# Patient Record
Sex: FEMALE | Race: WHITE | URBAN - METROPOLITAN AREA
[De-identification: names, ages, dates, MRNs, and addresses within clinical notes are randomized per-mention and may not be internally consistent; named-entity substitution may affect disease eponyms.]

---

## 2017-01-02 ENCOUNTER — TELEPHONE (OUTPATIENT)
Dept: FAMILY MEDICINE | Facility: CLINIC | Age: 82
End: 2017-01-02

## 2017-01-02 ENCOUNTER — HOSPITAL ENCOUNTER (EMERGENCY)
Facility: CLINIC | Age: 82
Discharge: HOME | End: 2017-01-02
Attending: FAMILY MEDICINE | Admitting: FAMILY MEDICINE
Payer: COMMERCIAL

## 2017-01-02 VITALS
OXYGEN SATURATION: 97 % | TEMPERATURE: 98.1 F | SYSTOLIC BLOOD PRESSURE: 117 MMHG | RESPIRATION RATE: 12 BRPM | DIASTOLIC BLOOD PRESSURE: 82 MMHG

## 2017-01-02 DIAGNOSIS — T24.211A: ICD-10-CM

## 2017-01-02 DIAGNOSIS — T24.212A: ICD-10-CM

## 2017-01-02 PROCEDURE — 99284 EMERGENCY DEPT VISIT MOD MDM: CPT | Mod: 25 | Performed by: FAMILY MEDICINE

## 2017-01-02 PROCEDURE — 99284 EMERGENCY DEPT VISIT MOD MDM: CPT | Mod: 25

## 2017-01-02 PROCEDURE — 16020 DRESS/DEBRID P-THICK BURN S: CPT

## 2017-01-02 PROCEDURE — 90471 IMMUNIZATION ADMIN: CPT

## 2017-01-02 PROCEDURE — 16020 DRESS/DEBRID P-THICK BURN S: CPT | Performed by: FAMILY MEDICINE

## 2017-01-02 PROCEDURE — 90715 TDAP VACCINE 7 YRS/> IM: CPT | Performed by: FAMILY MEDICINE

## 2017-01-02 PROCEDURE — 25000125 ZZHC RX 250: Performed by: FAMILY MEDICINE

## 2017-01-02 RX ADMIN — CLOSTRIDIUM TETANI TOXOID ANTIGEN (FORMALDEHYDE INACTIVATED), CORYNEBACTERIUM DIPHTHERIAE TOXOID ANTIGEN (FORMALDEHYDE INACTIVATED), BORDETELLA PERTUSSIS TOXOID ANTIGEN (GLUTARALDEHYDE INACTIVATED), BORDETELLA PERTUSSIS FILAMENTOUS HEMAGGLUTININ ANTIGEN (FORMALDEHYDE INACTIVATED), BORDETELLA PERTUSSIS PERTACTIN ANTIGEN, AND BORDETELLA PERTUSSIS FIMBRIAE 2/3 ANTIGEN 0.5 ML: 5; 2; 2.5; 5; 3; 5 INJECTION, SUSPENSION INTRAMUSCULAR at 15:55

## 2017-01-02 NOTE — ED AVS SNAPSHOT
[facility] Emergency Department    [address] [address]    Phone:  [phone]    Fax:  [phone]                                       Xochilt Trujillo   MRN: [id number]    Department:  [facility] Emergency Department   Date of Visit:  1/2/2017           After Visit Summary Signature Page     I have received my discharge instructions, and my questions have been answered. I have discussed any challenges I see with this plan with the nurse or doctor.    ..........................................................................................................................................  Patient/Patient Representative Signature      ..........................................................................................................................................  Patient Representative Print Name and Relationship to Patient    ..................................................               ................................................  Date                                            Time    ..........................................................................................................................................  Reviewed by Signature/Title    ...................................................              ..............................................  Date                                                            Time
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
## 2017-01-02 NOTE — DISCHARGE INSTRUCTIONS
Be seen for evaluation if signs of infection--increased pain, redness, pus-like drainage.   Home care nurse will do daily dressing changes this week.
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
## 2017-01-02 NOTE — ED AVS SNAPSHOT
[facility] Emergency Department    [address] [address]    Phone:  [phone]    Fax:  [phone]                                       Xochilt Trujillo   MRN: [id number]    Department:  [facility] Emergency Department   Date of Visit:  1/2/2017           Patient Information     Date Of Birth          5/28/1924        Your diagnoses for this visit were:     None       You were seen by Ubaldo Stephen MD.      Future Appointments        Provider Department Dept Phone Center    1/9/2017 2:15 PM Manju Joaquin MD [facility] [phone] [facility]      24 Hour Appointment Hotline       To make an appointment at any [facility], call [phone] ([phone]). If you don't have a family doctor or clinic, we will help you find one. [facility] are conveniently located to serve the needs of you and your family.             Review of your medicines      Our records show that you are taking the medicines listed below. If these are incorrect, please call your family doctor or clinic.        Dose / Directions Last dose taken    aspirin 81 MG EC tablet   Dose:  81 mg   Quantity:  90 tablet        Take 1 tablet (81 mg) by mouth daily   Refills:  3        blood glucose monitoring test strip   Commonly known as:  no brand specified   Quantity:  3 Box        One touch Ultra test strips testing once daily   Refills:  1        CALCIUM 600 PO        1 tablet by mouth twice daily   Refills:  0        CINNAMON PO   Dose:  1 capsule        Take 1 capsule by mouth 2 times daily   Refills:  0        LORazepam 0.5 MG tablet   Commonly known as:  ATIVAN   Dose:  0.25 mg   Quantity:  15 tablet        Take 0.5 tablets (0.25 mg) by mouth 2 times daily as needed for muscle spasms   Refills:  0        MULTIVITAMIN PO        1 tablet by mouth DAILY   Refills:  0        ONE TOUCH DELICA LANCETS Misc   Quantity:  30 each        Blood sugar check once daily   Refills:  5        * order  for DME   Quantity:  1 Device        Equipment being ordered: walker with wheels and seat   Refills:  0        * order for DME   Quantity:  1 Device        Equipment being ordered: First alert notification system.   Refills:  0        * Notice:  This list has 2 medication(s) that are the same as other medications prescribed for you. Read the directions carefully, and ask your doctor or other care provider to review them with you.            Orders Needing Specimen Collection     None      Pending Results     No orders found from 1/1/2017 to 1/3/2017.             Test Results from your hospital stay            Thank you for choosing [facility]       Thank you for choosing [facility] for your care. Our goal is always to provide you with excellent care. Hearing back from our patients is one way we can continue to improve our services. Please take a few minutes to complete the written survey that you may receive in the mail after you visit with us. Thank you!        LOC[employer]ALLhart Information     [employer] gives you secure access to your electronic health record. If you see a primary care provider, you can also send messages to your care team and make appointments. If you have questions, please call your primary care clinic.  If you do not have a primary care provider, please call [phone] and they will assist you.        After Visit Summary       This is your record. Keep this with you and show to your community pharmacist(s) and doctor(s) at your next visit.
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
## 2017-01-09 ENCOUNTER — OFFICE VISIT (OUTPATIENT)
Dept: NEUROLOGY | Facility: CLINIC | Age: 82
End: 2017-01-09
Payer: COMMERCIAL

## 2017-01-09 VITALS — DIASTOLIC BLOOD PRESSURE: 67 MMHG | SYSTOLIC BLOOD PRESSURE: 118 MMHG | TEMPERATURE: 98.6 F | HEART RATE: 95 BPM

## 2017-01-09 DIAGNOSIS — R41.3 MEMORY PROBLEM: ICD-10-CM

## 2017-01-09 DIAGNOSIS — R25.3 MUSCLE TWITCH: Primary | ICD-10-CM

## 2017-01-09 LAB
ALBUMIN SERPL-MCNC: 2.7 G/DL (ref 3.4–5)
ALP SERPL-CCNC: 86 U/L (ref 40–150)
ALT SERPL W P-5'-P-CCNC: 75 U/L (ref 0–50)
AMMONIA PLAS-SCNC: <10 UMOL/L (ref 10–50)
ANION GAP SERPL CALCULATED.3IONS-SCNC: 7 MMOL/L (ref 3–14)
AST SERPL W P-5'-P-CCNC: 60 U/L (ref 0–45)
BILIRUB SERPL-MCNC: 0.3 MG/DL (ref 0.2–1.3)
BUN SERPL-MCNC: 30 MG/DL (ref 7–30)
CALCIUM SERPL-MCNC: 9.3 MG/DL (ref 8.5–10.1)
CHLORIDE SERPL-SCNC: 107 MMOL/L (ref 94–109)
CO2 SERPL-SCNC: 29 MMOL/L (ref 20–32)
CREAT SERPL-MCNC: 1.49 MG/DL (ref 0.52–1.04)
GFR SERPL CREATININE-BSD FRML MDRD: 33 ML/MIN/1.7M2
GLUCOSE SERPL-MCNC: 113 MG/DL (ref 70–99)
POTASSIUM SERPL-SCNC: 4.7 MMOL/L (ref 3.4–5.3)
PROT SERPL-MCNC: 7.2 G/DL (ref 6.8–8.8)
SODIUM SERPL-SCNC: 143 MMOL/L (ref 133–144)

## 2017-01-09 PROCEDURE — 80053 COMPREHEN METABOLIC PANEL: CPT | Mod: 90 | Performed by: PSYCHIATRY & NEUROLOGY

## 2017-01-09 PROCEDURE — 82607 VITAMIN B-12: CPT | Mod: 90 | Performed by: PSYCHIATRY & NEUROLOGY

## 2017-01-09 PROCEDURE — 83921 ORGANIC ACID SINGLE QUANT: CPT | Mod: 90 | Performed by: PSYCHIATRY & NEUROLOGY

## 2017-01-09 PROCEDURE — 99000 SPECIMEN HANDLING OFFICE-LAB: CPT | Performed by: PSYCHIATRY & NEUROLOGY

## 2017-01-09 PROCEDURE — 99204 OFFICE O/P NEW MOD 45 MIN: CPT | Performed by: PSYCHIATRY & NEUROLOGY

## 2017-01-09 PROCEDURE — 36415 COLL VENOUS BLD VENIPUNCTURE: CPT | Performed by: PSYCHIATRY & NEUROLOGY

## 2017-01-09 PROCEDURE — 82140 ASSAY OF AMMONIA: CPT | Mod: 90 | Performed by: PSYCHIATRY & NEUROLOGY

## 2017-01-09 RX ORDER — LORAZEPAM 0.5 MG/1
0.25 TABLET ORAL 2 TIMES DAILY PRN
COMMUNITY
End: 2017-03-15

## 2017-01-09 NOTE — PATIENT INSTRUCTIONS
Plan:    Labs today - metabolic panel  Referral for Electroencephalogram   Return to clinic after the above are completed to discuss treatment options/plan

## 2017-01-09 NOTE — MR AVS SNAPSHOT
After Visit Summary   1/9/2017    Xochilt Trujillo    MRN: [id number]           Patient Information     Date Of Birth          5/28/1924        Visit Information        Provider Department      1/9/2017 2:15 PM Manju Joaquin MD [facility]        Today's Diagnoses     Muscle twitch    -  1       Care Instructions    Plan:    Labs today - metabolic panel  Referral for Electroencephalogram   Return to clinic after the above are completed to discuss treatment options/plan        Follow-ups after your visit        Future tests that were ordered for you today     Open Future Orders        Priority Expected Expires Ordered    EEG sleep deprived Routine  6/9/2017 1/9/2017            Who to contact     If you have questions or need follow up information about today's clinic visit or your schedule please contact [facility] directly at [phone].  Normal or non-critical lab and imaging results will be communicated to you by [employer], letter or phone within 4 business days after the clinic has received the results. If you do not hear from us within 7 days, please contact the clinic through [employer] or phone. If you have a critical or abnormal lab result, we will notify you by phone as soon as possible.  Submit refill requests through [employer] or call your pharmacy and they will forward the refill request to us. Please allow 3 business days for your refill to be completed.          Additional Information About Your Visit        MyChart Information     [employer] gives you secure access to your electronic health record. If you see a primary care provider, you can also send messages to your care team and make appointments. If you have questions, please call your primary care clinic.  If you do not have a primary care provider, please call [phone] and they will assist you.        Care EveryWhere ID     This is your Care EveryWhere ID. This could be used by other organizations to  access your [place] medical records  [id number]        Your Vitals Were     Pulse Temperature                95 98.6  F (37  C)           Blood Pressure from Last 3 Encounters:   01/09/17 118/67   01/02/17 117/82   11/18/16 110/60    Weight from Last 3 Encounters:   11/18/16 98 lb (44.453 kg)   11/11/16 106 lb (48.081 kg)   10/11/16 105 lb 6.4 oz (47.809 kg)              We Performed the Following     Ammonia     Comprehensive metabolic panel (BMP + Alb, Alk Phos, ALT, AST, Total. Bili, TP)     Methylmalonic acid     Vitamin B12        Primary Care Provider Office Phone # Fax #    Fran Haile [phone]       [facility] [address]  [place] [address]        Thank you!     Thank you for choosing [facility]  for your care. Our goal is always to provide you with excellent care. Hearing back from our patients is one way we can continue to improve our services. Please take a few minutes to complete the written survey that you may receive in the mail after your visit with us. Thank you!             Your Updated Medication List - Protect others around you: Learn how to safely use, store and throw away your medicines at www.disposemymeds.org.          This list is accurate as of: 1/9/17  2:55 PM.  Always use your most recent med list.                   Brand Name Dispense Instructions for use    aspirin 81 MG EC tablet     90 tablet    Take 1 tablet (81 mg) by mouth daily       blood glucose monitoring test strip    no brand specified    3 Box    One touch Ultra test strips testing once daily       LORazepam 0.5 MG tablet    ATIVAN     Take 0.25 mg by mouth 2 times daily as needed for other (muscle spasm)       ONE TOUCH DELICA LANCETS Misc     30 each    Blood sugar check once daily       * order for DME     1 Device    Equipment being ordered: walker with wheels and seat       * order for DME     1 Device    Equipment being ordered: First alert notification  system.       oxyCODONE-acetaminophen 5-325 MG per tablet    PERCOCET    28 tablet    Take 0.5-1 tablets by mouth every 4 hours as needed for pain       * Notice:  This list has 2 medication(s) that are the same as other medications prescribed for you. Read the directions carefully, and ask your doctor or other care provider to review them with you.
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
## 2017-01-10 LAB — VIT B12 SERPL-MCNC: 599 PG/ML (ref 193–986)

## 2017-01-11 LAB — METHYLMALONATE SERPL-SCNC: 0.4

## 2017-01-12 NOTE — PROGRESS NOTES
Quick Note:    Please advise Xochilt Trujillo,  1924, that her lab results show the known kidney dysfunction as well as decreased protein and elevated liver enzymes which seems to be a new problem. I am not sure if these findings relate to the twitching, but I do think that she should review her blood test results with her primary care provider, to see if he thinks any additional testing should be done.   [phone] (home)   Manju Joaquin    ______

## 2017-01-19 ENCOUNTER — HOSPITAL ENCOUNTER (OUTPATIENT)
Dept: NEUROLOGY | Facility: CLINIC | Age: 82
Discharge: HOME | End: 2017-01-19
Attending: PSYCHIATRY & NEUROLOGY | Admitting: PSYCHIATRY & NEUROLOGY
Payer: COMMERCIAL

## 2017-01-19 DIAGNOSIS — R25.3 MUSCLE TWITCH: ICD-10-CM

## 2017-01-19 PROCEDURE — 95819 EEG AWAKE AND ASLEEP: CPT

## 2017-01-19 PROCEDURE — 95819 EEG AWAKE AND ASLEEP: CPT | Mod: 26 | Performed by: PSYCHIATRY & NEUROLOGY

## 2017-01-22 NOTE — PROCEDURES
Outpatient Sleep deprived EEG Report     Patient Name:  Xochilt Trujillo  MRN:     [id number]  : `   1924    EEG#: [id number].        DATE OF RECORDIN2017.        DURATION OF RECORDIN minutes and 37 seconds.        CLINICAL SUMMARY: Xochilt Trujillo is 89 year old female patient with past medical history of chronic kidney disease, hypertension, and diabetes mellitus type 2 who was recently seen in neurology clinic for muscle twitching.  This study was ordered to evaluate for seizures and epileptiform abnormalities.  On the day of the study, the patient was reported to take prn Ativan and prn Percocet.        TECHNICAL SUMMARY:  This outpatient sleep-deprived EEG monitoring procedure was performed with 19 scalp electrodes in 10-20 system placements, and additional scalp, precordial, and other surface electrodes used for electrical referencing and artifact detection.  Video monitoring was not utilized.          INTERICTAL EEG ACTIVITIES:  During maximal waking, background consists of symmetric, moderate amplitude 6-7 Hz posterior dominant activity which is reactive to eye opening. Rarely and briefly, frequency reaches 8 Hz. It is intermixed with diffuse lower frequency theta and occasional delta slowing. Drowsiness is manifested by predominance of centrally maximum semirhythmic theta slowing and attenuation of posterior dominant activity. At that time, intermittent generalized rhythmic delta activity with frontal predominance is also seen. During quite periods of rest, no well defined sleep architecture is seen except poorly formed vertex waves. Intermittent photic stimulation is performed and does not induce any epileptiform abnormalities. Hyperventilation is not performed.        INTERICTAL EPILEPTIFORM DISCHARGES:  None.        ICTAL RECORDINGS:  No electrographic or clinical seizures and no paroxysmal behavioral events are recorded during this study.        IMPRESSION:  This outpatient  sleep-deprived EEG study is abnormal during wakefulness and sleep due to diffuse generalized slowing consistent with mild to moderate encephalopathy of non-specific etiology, including toxic, metabolic, degenerative, structural, vascular, and other pathologies. Generalized rhythmic delta activity with frontal predominance might be considered a normal finding in elderly drowsiness. Also, it may represent subcortical dysfunction secondary to vascular disease and/or other degenerative conditions. No interictal epileptiform discharges, no electrographic or clinical seizures, and no paroxysmal behavioral events were seen during this study.    Clinical correlation is recommended.      Galen Macias MD

## 2017-03-03 ENCOUNTER — TELEPHONE (OUTPATIENT)
Dept: FAMILY MEDICINE | Facility: CLINIC | Age: 82
End: 2017-03-03

## 2017-03-03 DIAGNOSIS — Z76.89 HEALTH CARE HOME: ICD-10-CM
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
## 2017-03-06 ENCOUNTER — HOSPITAL ENCOUNTER (INPATIENT)
Facility: CLINIC | Age: 82
LOS: 2 days | Discharge: HOME HEALTH | DRG: 689 | End: 2017-03-09
Attending: STUDENT IN AN ORGANIZED HEALTH CARE EDUCATION/TRAINING PROGRAM | Admitting: FAMILY MEDICINE
Payer: COMMERCIAL

## 2017-03-06 ENCOUNTER — TELEPHONE (OUTPATIENT)
Dept: FAMILY MEDICINE | Facility: CLINIC | Age: 82
End: 2017-03-06

## 2017-03-06 ENCOUNTER — APPOINTMENT (OUTPATIENT)
Dept: CT IMAGING | Facility: CLINIC | Age: 82
DRG: 689 | End: 2017-03-06
Attending: STUDENT IN AN ORGANIZED HEALTH CARE EDUCATION/TRAINING PROGRAM
Payer: COMMERCIAL

## 2017-03-06 DIAGNOSIS — N39.0 URINARY TRACT INFECTION, SITE UNSPECIFIED: ICD-10-CM

## 2017-03-06 DIAGNOSIS — R41.0 DELIRIUM: Primary | ICD-10-CM

## 2017-03-06 DIAGNOSIS — R41.82 ALTERED MENTAL STATUS, UNSPECIFIED ALTERED MENTAL STATUS TYPE: ICD-10-CM

## 2017-03-06 LAB
ALBUMIN SERPL-MCNC: 3.2 G/DL (ref 3.4–5)
ALBUMIN UR-MCNC: 10 MG/DL
ALP SERPL-CCNC: 75 U/L (ref 40–150)
ALT SERPL W P-5'-P-CCNC: 25 U/L (ref 0–50)
ANION GAP SERPL CALCULATED.3IONS-SCNC: 7 MMOL/L (ref 3–14)
APPEARANCE UR: ABNORMAL
AST SERPL W P-5'-P-CCNC: 17 U/L (ref 0–45)
BACTERIA #/AREA URNS HPF: ABNORMAL /HPF
BASOPHILS # BLD AUTO: 0.1 10E9/L (ref 0–0.2)
BASOPHILS NFR BLD AUTO: 0.6 %
BILIRUB SERPL-MCNC: 0.3 MG/DL (ref 0.2–1.3)
BILIRUB UR QL STRIP: NEGATIVE
BUN SERPL-MCNC: 31 MG/DL (ref 7–30)
CALCIUM SERPL-MCNC: 8.8 MG/DL (ref 8.5–10.1)
CHLORIDE SERPL-SCNC: 107 MMOL/L (ref 94–109)
CO2 SERPL-SCNC: 27 MMOL/L (ref 20–32)
COLOR UR AUTO: YELLOW
CREAT SERPL-MCNC: 1.42 MG/DL (ref 0.52–1.04)
DIFFERENTIAL METHOD BLD: NORMAL
EOSINOPHIL # BLD AUTO: 0.1 10E9/L (ref 0–0.7)
EOSINOPHIL NFR BLD AUTO: 1.5 %
ERYTHROCYTE [DISTWIDTH] IN BLOOD BY AUTOMATED COUNT: 14.5 % (ref 10–15)
GFR SERPL CREATININE-BSD FRML MDRD: 35 ML/MIN/1.7M2
GLUCOSE SERPL-MCNC: 112 MG/DL (ref 70–99)
GLUCOSE UR STRIP-MCNC: NEGATIVE MG/DL
HCT VFR BLD AUTO: 37.2 % (ref 35–47)
HGB BLD-MCNC: 12 G/DL (ref 11.7–15.7)
HGB UR QL STRIP: ABNORMAL
IMM GRANULOCYTES # BLD: 0 10E9/L (ref 0–0.4)
IMM GRANULOCYTES NFR BLD: 0.3 %
INR PPP: 0.93 (ref 0.86–1.14)
KETONES UR STRIP-MCNC: NEGATIVE MG/DL
LEUKOCYTE ESTERASE UR QL STRIP: ABNORMAL
LYMPHOCYTES # BLD AUTO: 1.8 10E9/L (ref 0.8–5.3)
LYMPHOCYTES NFR BLD AUTO: 18.9 %
MCH RBC QN AUTO: 27.9 PG (ref 26.5–33)
MCHC RBC AUTO-ENTMCNC: 32.3 G/DL (ref 31.5–36.5)
MCV RBC AUTO: 87 FL (ref 78–100)
MONOCYTES # BLD AUTO: 0.9 10E9/L (ref 0–1.3)
MONOCYTES NFR BLD AUTO: 9.2 %
MUCOUS THREADS #/AREA URNS LPF: PRESENT /LPF
NEUTROPHILS # BLD AUTO: 6.6 10E9/L (ref 1.6–8.3)
NEUTROPHILS NFR BLD AUTO: 69.5 %
NITRATE UR QL: POSITIVE
PH UR STRIP: 5.5 PH (ref 5–7)
PLATELET # BLD AUTO: 275 10E9/L (ref 150–450)
POTASSIUM SERPL-SCNC: 4.3 MMOL/L (ref 3.4–5.3)
PROT SERPL-MCNC: 7 G/DL (ref 6.8–8.8)
RBC # BLD AUTO: 4.3 10E12/L (ref 3.8–5.2)
RBC #/AREA URNS AUTO: 9 /HPF (ref 0–2)
SODIUM SERPL-SCNC: 141 MMOL/L (ref 133–144)
SP GR UR STRIP: 1.02 (ref 1–1.03)
SQUAMOUS #/AREA URNS AUTO: 3 /HPF (ref 0–1)
TROPONIN I SERPL-MCNC: NORMAL UG/L (ref 0–0.04)
TSH SERPL DL<=0.005 MIU/L-ACNC: 2.54 MU/L (ref 0.4–4)
URN SPEC COLLECT METH UR: ABNORMAL
UROBILINOGEN UR STRIP-MCNC: NORMAL MG/DL (ref 0–2)
WBC # BLD AUTO: 9.5 10E9/L (ref 4–11)
WBC #/AREA URNS AUTO: 41 /HPF (ref 0–2)

## 2017-03-06 PROCEDURE — 96374 THER/PROPH/DIAG INJ IV PUSH: CPT

## 2017-03-06 PROCEDURE — 93010 ELECTROCARDIOGRAM REPORT: CPT | Performed by: STUDENT IN AN ORGANIZED HEALTH CARE EDUCATION/TRAINING PROGRAM

## 2017-03-06 PROCEDURE — 81001 URINALYSIS AUTO W/SCOPE: CPT | Performed by: EMERGENCY MEDICINE

## 2017-03-06 PROCEDURE — 99285 EMERGENCY DEPT VISIT HI MDM: CPT | Mod: 25 | Performed by: STUDENT IN AN ORGANIZED HEALTH CARE EDUCATION/TRAINING PROGRAM

## 2017-03-06 PROCEDURE — 87088 URINE BACTERIA CULTURE: CPT | Performed by: STUDENT IN AN ORGANIZED HEALTH CARE EDUCATION/TRAINING PROGRAM

## 2017-03-06 PROCEDURE — 87186 SC STD MICRODIL/AGAR DIL: CPT | Performed by: STUDENT IN AN ORGANIZED HEALTH CARE EDUCATION/TRAINING PROGRAM

## 2017-03-06 PROCEDURE — 84443 ASSAY THYROID STIM HORMONE: CPT | Performed by: STUDENT IN AN ORGANIZED HEALTH CARE EDUCATION/TRAINING PROGRAM

## 2017-03-06 PROCEDURE — 99285 EMERGENCY DEPT VISIT HI MDM: CPT | Mod: 25

## 2017-03-06 PROCEDURE — 70450 CT HEAD/BRAIN W/O DYE: CPT

## 2017-03-06 PROCEDURE — 93005 ELECTROCARDIOGRAM TRACING: CPT

## 2017-03-06 PROCEDURE — 80053 COMPREHEN METABOLIC PANEL: CPT | Performed by: STUDENT IN AN ORGANIZED HEALTH CARE EDUCATION/TRAINING PROGRAM

## 2017-03-06 PROCEDURE — 85610 PROTHROMBIN TIME: CPT | Performed by: STUDENT IN AN ORGANIZED HEALTH CARE EDUCATION/TRAINING PROGRAM

## 2017-03-06 PROCEDURE — 96361 HYDRATE IV INFUSION ADD-ON: CPT

## 2017-03-06 PROCEDURE — 87086 URINE CULTURE/COLONY COUNT: CPT | Performed by: STUDENT IN AN ORGANIZED HEALTH CARE EDUCATION/TRAINING PROGRAM

## 2017-03-06 PROCEDURE — 84484 ASSAY OF TROPONIN QUANT: CPT | Performed by: STUDENT IN AN ORGANIZED HEALTH CARE EDUCATION/TRAINING PROGRAM

## 2017-03-06 PROCEDURE — 85025 COMPLETE CBC W/AUTO DIFF WBC: CPT | Performed by: STUDENT IN AN ORGANIZED HEALTH CARE EDUCATION/TRAINING PROGRAM

## 2017-03-06 PROCEDURE — 82140 ASSAY OF AMMONIA: CPT | Performed by: STUDENT IN AN ORGANIZED HEALTH CARE EDUCATION/TRAINING PROGRAM

## 2017-03-06 PROCEDURE — 25000128 H RX IP 250 OP 636: Performed by: STUDENT IN AN ORGANIZED HEALTH CARE EDUCATION/TRAINING PROGRAM

## 2017-03-06 RX ADMIN — SODIUM CHLORIDE 500 ML: 9 INJECTION, SOLUTION INTRAVENOUS at 23:30

## 2017-03-06 NOTE — IP AVS SNAPSHOT
[facility]    [address] [address]    Phone:  [phone]    Fax:  [phone]                                       After Visit Summary   3/6/2017    Xochilt Trujillo    MRN: [id number]           After Visit Summary Signature Page     I have received my discharge instructions, and my questions have been answered. I have discussed any challenges I see with this plan with the nurse or doctor.    ..........................................................................................................................................  Patient/Patient Representative Signature      ..........................................................................................................................................  Patient Representative Print Name and Relationship to Patient    ..................................................               ................................................  Date                                            Time    ..........................................................................................................................................  Reviewed by Signature/Title    ...................................................              ..............................................  Date                                                            Time
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
## 2017-03-06 NOTE — TELEPHONE ENCOUNTER
Pt daughter is calling and reports that she lives with her mother  And when she came home today, her mother was very confused and dint really know who she was and was talking gibberish and very confused, and not making any sense what so ever, facial features looked ok and she doesn't hurt anywhere, Tone asked her mom to get her cell phone out of her pocket and she started looking in the couch, this is not normal as the phone was in her moms jeans pocket, please advise.     Spoke with satinder MARTINEZ and she advised she Go to the ER and or call 911, it could be a diabetes issue and or a possible UTI, and we have no available dr at this moment.     Erica Carrasco, [place]

## 2017-03-06 NOTE — LETTER
Transition Communication Hand-off for Care Transitions to Next Level of Care Provider    Name: Xochilt Trujillo  MRN #: [id number]  Primary Care Provider: Fran Haile  Primary Care MD Name:  (Lazara)  Primary Clinic: [facility] [address]  [place] [address]  Primary Care Clinic Name:  ([facility])  Reason for Hospitalization:  Altered mental status, unspecified altered mental status type [R41.82]  Urinary tract infection, site unspecified [N39.0]  Admit Date/Time: 3/6/2017 10:10 PM  Discharge Date: 3/8/17  Payor Source: Payor: UCARE / Plan: [employer] FOR S[employer]IORS / Product Type: HMO /          Reason for Communication Hand-off Referral: Fragility    Discharge Plan:[facility]       Concern for non-adherence with plan of care:   Y/N no      Follow-up specialty is recommended: Yes    Follow-up plan:  Future Appointments  Date Time Provider Department Center   3/8/2017 11:30 AM [facility]   4/18/2017 9:30 AM Manju Joaquin MD [address]         [place] Recommendations:  Pt recently discharged from [facility]. Pt admitted here with delerium, doing better. Pt lives with dtr, sounds like dtr is trying to fill out paperwork for [place] or some sort of county assistance. Pt may eventually need increased services at home.     Angélica Rodríguez MSW, [facility], [facility] [phone]    AVS/Discharge Summary is the source of truth; this is a helpful guide for improved communication of patient story

## 2017-03-06 NOTE — TELEPHONE ENCOUNTER
No care coordination needs identified.  No concerns noted by home care below.  Patient is under the care of family.    JENIFFER Rodriguez, RN, PHN  [facility] Care Coordinator  [phone]  March 6, 2017
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
## 2017-03-07 ENCOUNTER — APPOINTMENT (OUTPATIENT)
Dept: OCCUPATIONAL THERAPY | Facility: CLINIC | Age: 82
DRG: 689 | End: 2017-03-07
Payer: COMMERCIAL

## 2017-03-07 ENCOUNTER — APPOINTMENT (OUTPATIENT)
Dept: GENERAL RADIOLOGY | Facility: CLINIC | Age: 82
DRG: 689 | End: 2017-03-07
Attending: STUDENT IN AN ORGANIZED HEALTH CARE EDUCATION/TRAINING PROGRAM
Payer: COMMERCIAL

## 2017-03-07 PROBLEM — R41.0 DELIRIUM: Status: ACTIVE | Noted: 2017-03-07

## 2017-03-07 PROBLEM — N39.0 URINARY TRACT INFECTION: Status: ACTIVE | Noted: 2017-03-07

## 2017-03-07 LAB
AMMONIA PLAS-SCNC: <10 UMOL/L (ref 10–50)
LACTATE BLD-SCNC: 0.8 MMOL/L (ref 0.7–2.1)
PLATELET # BLD AUTO: 277 10E9/L (ref 150–450)
T4 FREE SERPL-MCNC: 1.02 NG/DL (ref 0.76–1.46)
TSH SERPL DL<=0.05 MIU/L-ACNC: 3.13 MU/L (ref 0.4–4)
VIT B12 SERPL-MCNC: 415 PG/ML (ref 193–986)

## 2017-03-07 PROCEDURE — 83605 ASSAY OF LACTIC ACID: CPT | Performed by: PHYSICIAN ASSISTANT

## 2017-03-07 PROCEDURE — 84439 ASSAY OF FREE THYROXINE: CPT | Performed by: PHYSICIAN ASSISTANT

## 2017-03-07 PROCEDURE — 25000128 H RX IP 250 OP 636: Performed by: PHYSICIAN ASSISTANT

## 2017-03-07 PROCEDURE — 25000132 ZZH RX MED GY IP 250 OP 250 PS 637: Performed by: PHYSICIAN ASSISTANT

## 2017-03-07 PROCEDURE — 97165 OT EVAL LOW COMPLEX 30 MIN: CPT | Mod: GO | Performed by: OCCUPATIONAL THERAPIST

## 2017-03-07 PROCEDURE — 36415 COLL VENOUS BLD VENIPUNCTURE: CPT | Performed by: PHYSICIAN ASSISTANT

## 2017-03-07 PROCEDURE — 25000128 H RX IP 250 OP 636: Performed by: STUDENT IN AN ORGANIZED HEALTH CARE EDUCATION/TRAINING PROGRAM

## 2017-03-07 PROCEDURE — 84443 ASSAY THYROID STIM HORMONE: CPT | Performed by: PHYSICIAN ASSISTANT

## 2017-03-07 PROCEDURE — 99221 1ST HOSP IP/OBS SF/LOW 40: CPT | Mod: AI | Performed by: FAMILY MEDICINE

## 2017-03-07 PROCEDURE — 85049 AUTOMATED PLATELET COUNT: CPT | Performed by: PHYSICIAN ASSISTANT

## 2017-03-07 PROCEDURE — 40000133 ZZH STATISTIC OT WARD VISIT: Performed by: OCCUPATIONAL THERAPIST

## 2017-03-07 PROCEDURE — 99207 ZZC CDG-HISTORY COMP: MEETS EXP. PROB FOCUSED- DOWN CODED LACK OF PFSH: CPT | Performed by: FAMILY MEDICINE

## 2017-03-07 PROCEDURE — 82607 VITAMIN B-12: CPT | Performed by: PHYSICIAN ASSISTANT

## 2017-03-07 PROCEDURE — 97535 SELF CARE MNGMENT TRAINING: CPT | Mod: GO | Performed by: OCCUPATIONAL THERAPIST

## 2017-03-07 PROCEDURE — 71020 XR CHEST 2 VW: CPT

## 2017-03-07 PROCEDURE — 12000007 ZZH R&B INTERMEDIATE

## 2017-03-07 RX ORDER — ASPIRIN 81 MG/1
81 TABLET ORAL DAILY
Status: DISCONTINUED | OUTPATIENT
Start: 2017-03-07 | End: 2017-03-09 | Stop reason: HOSPADM

## 2017-03-07 RX ORDER — PROCHLORPERAZINE 25 MG
12.5 SUPPOSITORY, RECTAL RECTAL EVERY 12 HOURS PRN
Status: DISCONTINUED | OUTPATIENT
Start: 2017-03-07 | End: 2017-03-09 | Stop reason: HOSPADM

## 2017-03-07 RX ORDER — ONDANSETRON 4 MG/1
4 TABLET, ORALLY DISINTEGRATING ORAL EVERY 6 HOURS PRN
Status: DISCONTINUED | OUTPATIENT
Start: 2017-03-07 | End: 2017-03-09 | Stop reason: HOSPADM

## 2017-03-07 RX ORDER — ONDANSETRON 2 MG/ML
4 INJECTION INTRAMUSCULAR; INTRAVENOUS EVERY 6 HOURS PRN
Status: DISCONTINUED | OUTPATIENT
Start: 2017-03-07 | End: 2017-03-09 | Stop reason: HOSPADM

## 2017-03-07 RX ORDER — ACETAMINOPHEN 325 MG/1
650 TABLET ORAL EVERY 4 HOURS PRN
Status: DISCONTINUED | OUTPATIENT
Start: 2017-03-07 | End: 2017-03-09 | Stop reason: HOSPADM

## 2017-03-07 RX ORDER — ONDANSETRON 2 MG/ML
4 INJECTION INTRAMUSCULAR; INTRAVENOUS EVERY 6 HOURS PRN
Status: DISCONTINUED | OUTPATIENT
Start: 2017-03-07 | End: 2017-03-07

## 2017-03-07 RX ORDER — SODIUM CHLORIDE 9 MG/ML
INJECTION, SOLUTION INTRAVENOUS CONTINUOUS
Status: ACTIVE | OUTPATIENT
Start: 2017-03-07 | End: 2017-03-07

## 2017-03-07 RX ORDER — ONDANSETRON 4 MG/1
4 TABLET, ORALLY DISINTEGRATING ORAL EVERY 6 HOURS PRN
Status: DISCONTINUED | OUTPATIENT
Start: 2017-03-07 | End: 2017-03-07

## 2017-03-07 RX ORDER — LORAZEPAM 0.5 MG/1
0.25 TABLET ORAL 2 TIMES DAILY PRN
Status: DISCONTINUED | OUTPATIENT
Start: 2017-03-07 | End: 2017-03-09 | Stop reason: HOSPADM

## 2017-03-07 RX ORDER — ACETAMINOPHEN 325 MG/1
650 TABLET ORAL EVERY 4 HOURS PRN
Status: DISCONTINUED | OUTPATIENT
Start: 2017-03-07 | End: 2017-03-07

## 2017-03-07 RX ORDER — CEFTRIAXONE 1 G/1
1 INJECTION, POWDER, FOR SOLUTION INTRAMUSCULAR; INTRAVENOUS EVERY 24 HOURS
Status: DISCONTINUED | OUTPATIENT
Start: 2017-03-07 | End: 2017-03-09 | Stop reason: HOSPADM

## 2017-03-07 RX ORDER — HEPARIN SODIUM 5000 [USP'U]/.5ML
5000 INJECTION, SOLUTION INTRAVENOUS; SUBCUTANEOUS EVERY 12 HOURS
Status: DISCONTINUED | OUTPATIENT
Start: 2017-03-07 | End: 2017-03-09 | Stop reason: HOSPADM

## 2017-03-07 RX ORDER — PROCHLORPERAZINE MALEATE 5 MG
5 TABLET ORAL EVERY 6 HOURS PRN
Status: DISCONTINUED | OUTPATIENT
Start: 2017-03-07 | End: 2017-03-09 | Stop reason: HOSPADM

## 2017-03-07 RX ORDER — NALOXONE HYDROCHLORIDE 0.4 MG/ML
.1-.4 INJECTION, SOLUTION INTRAMUSCULAR; INTRAVENOUS; SUBCUTANEOUS
Status: DISCONTINUED | OUTPATIENT
Start: 2017-03-07 | End: 2017-03-09 | Stop reason: HOSPADM

## 2017-03-07 RX ORDER — OXYCODONE AND ACETAMINOPHEN 5; 325 MG/1; MG/1
.5-1 TABLET ORAL EVERY 4 HOURS PRN
Status: DISCONTINUED | OUTPATIENT
Start: 2017-03-07 | End: 2017-03-09 | Stop reason: HOSPADM

## 2017-03-07 RX ORDER — SODIUM CHLORIDE 9 MG/ML
INJECTION, SOLUTION INTRAVENOUS CONTINUOUS
Status: DISCONTINUED | OUTPATIENT
Start: 2017-03-07 | End: 2017-03-08

## 2017-03-07 RX ORDER — NALOXONE HYDROCHLORIDE 0.4 MG/ML
.1-.4 INJECTION, SOLUTION INTRAMUSCULAR; INTRAVENOUS; SUBCUTANEOUS
Status: DISCONTINUED | OUTPATIENT
Start: 2017-03-07 | End: 2017-03-07

## 2017-03-07 RX ADMIN — SODIUM CHLORIDE: 9 INJECTION, SOLUTION INTRAVENOUS at 02:04

## 2017-03-07 RX ADMIN — ASPIRIN 81 MG: 81 TABLET, COATED ORAL at 08:32

## 2017-03-07 RX ADMIN — HEPARIN SODIUM 5000 UNITS: 10000 INJECTION, SOLUTION INTRAVENOUS; SUBCUTANEOUS at 12:55

## 2017-03-07 RX ADMIN — SODIUM CHLORIDE: 9 INJECTION, SOLUTION INTRAVENOUS at 20:18

## 2017-03-07 RX ADMIN — CEFTRIAXONE 1 G: 1 INJECTION, POWDER, FOR SOLUTION INTRAMUSCULAR; INTRAVENOUS at 00:46

## 2017-03-07 RX ADMIN — Medication 0.25 MG: at 18:21

## 2017-03-07 ASSESSMENT — VISUAL ACUITY: OU: OTHER (SEE COMMENT)

## 2017-03-07 ASSESSMENT — ACTIVITIES OF DAILY LIVING (ADL)
SWALLOWING: 0-->SWALLOWS FOODS/LIQUIDS WITHOUT DIFFICULTY
COGNITION: 2 - DIFFICULTY WITH ORGANIZING THOUGHTS
RETIRED_COMMUNICATION: 0-->UNDERSTANDS/COMMUNICATES WITHOUT DIFFICULTY
BATHING: 2-->ASSISTIVE PERSON
RETIRED_EATING: 0-->INDEPENDENT
DRESS: 0-->INDEPENDENT

## 2017-03-07 NOTE — ED PROVIDER NOTES
History     Chief Complaint   Patient presents with     Altered Mental Status     confused     Chest Pain     pressure     Shortness of Breath     HPI  Xochilt Trujillo is a 89 year old female with past medical history which includes abdominal aneurysm, previous cerebral infarction, Epic ulcer disease, myocardial infarction, chronic kidney disease, diabetes mellitus, and recurrent urinary tract infections who presents with daughter for evaluation of intermittent confusion this evening. Patient lives with her daughter and she explains that when she arrived home from work the patient seemed confused talking about her birthday tomorrow, which is not the case. She was also using a butter knife as a fork for dinner. However she has been without focal unilateral deficits or sign of stroke, according to daughter. The patient has also been without fever/chills, cough, shortness of breath, chest pain, abdominal pain, nausea/vomiting, genitourinary symptoms, or other concerns.    I have reviewed the Medications, Allergies, Past Medical and Surgical History, and Social History in the Epic system.    Patient Active Problem List   Diagnosis     Iron deficiency anemia     Abdominal aneurysm (H)     HYPERLIPIDEMIA LDL GOAL <100     Cerebral infarction (H)     PUD (peptic ulcer disease)     Ovarian cancer (H)     Erosive gastritis     MI (myocardial infarction) (H)     Sigmoid diverticulitis     CKD (chronic kidney disease) stage 4, GFR 15-29 ml/min (H)     Diabetes mellitus type 2, uncomplicated (H)     Hypertension goal BP (blood pressure) < 140/90     Advanced directives, counseling/discussion     Health Care Home     Pseudophakia with Yag Caps, OU     PVD (posterior vitreous detachment) OU     Age-related osteoporosis without current pathological fracture     Dermatochalasis, unspecified laterality     Delirium       Past Surgical History   Procedure Laterality Date     Hysterectomy, cervix status unknown       For ovarian  cancer     D & c       Hc esophagoscopy, diagnostic       Colonoscopy       Extracapsular cataract extration with intraocular lens implant  1-03/9-04     left/right     Cataract iol, rt/lt         Social History     Social History     Marital status:      Spouse name: N/A     Number of children: N/A     Years of education: N/A     Occupational History      Retired     Social History Main Topics     Smoking status: Never Smoker     Smokeless tobacco: Never Used     Alcohol use No     Drug use: No     Sexual activity: No     Other Topics Concern     Parent/Sibling W/ Cabg, Mi Or Angioplasty Before 65f 55m? No     Social History Narrative       No family history on file.    Most Recent Immunizations   Administered Date(s) Administered     Influenza (High Dose) 3 valent vaccine 10/11/2016     Influenza (IIV3) 09/13/2010     Pneumococcal (PCV 13) 10/11/2016     Pneumococcal 23 valent 01/16/2008     TD (ADULT, 7+) 01/16/2007     TDAP (ADACEL AGES 11-64) 01/02/2017       Review of Systems  Constitutional: Negative for fever or chills.  HENT: Negative oral or throat pain.  Eye: Negative for visual changes from baseline.  Respiratory: Negative for cough or shortness of breath.  Cardiovascular: Negative for chest pain.  Gastrointestinal: Negative for abdominal pain, vomiting, or diarrhea.   Genitourinary: Negative for dysuria.  Musculoskeletal: Negative for back pain or recent injuries.  Neurological: Positive for waxing and waning confusion. Negative for headache or dizziness.  Skin: Negative for rash.    All others reviewed and are negative.      Physical Exam   BP: 161/81  Pulse: 88  Temp: 97.8  F (36.6  C)  Resp: 20  SpO2: 97 %  Physical Exam  Constitutional: Well developed, well nourished. Appears nontoxic and in no acute distress. Resting comfortably on the gurney.  Head: Normocephalic and atraumatic. Symmetrical in appearance.  Eyes: Conjunctivae are normal.  Neck: Neck supple.  Cardiovascular: No cyanosis. RRR.    Respiratory: Effort normal, no respiratory distress. CTAB without diminished regions. No wheezing, rhonchi, or crackles.  Gastrointestinal: Soft, nondistended abdomen. Nontender and without guarding. No rigidity or rebound tenderness. Negative McBurney's point. Negative for Leyva's sign.   Musculoskeletal: Moves all extremities spontaneously and without complaint.  Neuro: Patient is alert and oriented to person.  Skin: Skin is warm and dry, not diaphoretic.  Psych: Appears to have a normal mood and affect.      ED Course     ED Course     Procedures               EKG Interpretation:      Interpreted by: Walker Juares  Time reviewed: Upon arrival     Symptoms at time of EKG: Delirium  Rhythm: Sinus  Rate: Normal  Axis: Normal    Conduction: None atypical   ST Segments/ T Waves: No pathologic ST-elevations or T-wave abnormalities.  Q Waves: None  Comparison to prior: Similar morphology to previous     Clinical Impression: No sign of ischemia         Critical Care time:  none               Results for orders placed or performed during the hospital encounter of 03/06/17 (from the past 24 hour(s))   UA reflex to Microscopic and Culture   Result Value Ref Range    Color Urine Yellow     Appearance Urine Slightly Cloudy     Glucose Urine Negative NEG mg/dL    Bilirubin Urine Negative NEG    Ketones Urine Negative NEG mg/dL    Specific Gravity Urine 1.016 1.003 - 1.035    Blood Urine Trace (A) NEG    pH Urine 5.5 5.0 - 7.0 pH    Protein Albumin Urine 10 (A) NEG mg/dL    Urobilinogen mg/dL Normal 0.0 - 2.0 mg/dL    Nitrite Urine Positive (A) NEG    Leukocyte Esterase Urine Large (A) NEG    Source Midstream Urine     RBC Urine 9 (H) 0 - 2 /HPF    WBC Urine 41 (H) 0 - 2 /HPF    Bacteria Urine Moderate (A) NEG /HPF    Squamous Epithelial /HPF Urine 3 (H) 0 - 1 /HPF    Mucous Urine Present (A) NEG /LPF   CBC with platelets differential   Result Value Ref Range    WBC 9.5 4.0 - 11.0 10e9/L    RBC Count 4.30 3.8 - 5.2 10e12/L     Hemoglobin 12.0 11.7 - 15.7 g/dL    Hematocrit 37.2 35.0 - 47.0 %    MCV 87 78 - 100 fl    MCH 27.9 26.5 - 33.0 pg    MCHC 32.3 31.5 - 36.5 g/dL    RDW 14.5 10.0 - 15.0 %    Platelet Count 275 150 - 450 10e9/L    Diff Method Automated Method     % Neutrophils 69.5 %    % Lymphocytes 18.9 %    % Monocytes 9.2 %    % Eosinophils 1.5 %    % Basophils 0.6 %    % Immature Granulocytes 0.3 %    Absolute Neutrophil 6.6 1.6 - 8.3 10e9/L    Absolute Lymphocytes 1.8 0.8 - 5.3 10e9/L    Absolute Monocytes 0.9 0.0 - 1.3 10e9/L    Absolute Eosinophils 0.1 0.0 - 0.7 10e9/L    Absolute Basophils 0.1 0.0 - 0.2 10e9/L    Abs Immature Granulocytes 0.0 0 - 0.4 10e9/L   INR   Result Value Ref Range    INR 0.93 0.86 - 1.14   Troponin I   Result Value Ref Range    Troponin I ES  0.000 - 0.045 ug/L     <0.015  The 99th percentile for upper reference range is 0.045 ug/L.  Troponin values in   the range of 0.045 - 0.120 ug/L may be associated with risks of adverse   clinical events.     Comprehensive metabolic panel   Result Value Ref Range    Sodium 141 133 - 144 mmol/L    Potassium 4.3 3.4 - 5.3 mmol/L    Chloride 107 94 - 109 mmol/L    Carbon Dioxide 27 20 - 32 mmol/L    Anion Gap 7 3 - 14 mmol/L    Glucose 112 (H) 70 - 99 mg/dL    Urea Nitrogen 31 (H) 7 - 30 mg/dL    Creatinine 1.42 (H) 0.52 - 1.04 mg/dL    GFR Estimate 35 (L) >60 mL/min/1.7m2    GFR Estimate If Black 42 (L) >60 mL/min/1.7m2    Calcium 8.8 8.5 - 10.1 mg/dL    Bilirubin Total 0.3 0.2 - 1.3 mg/dL    Albumin 3.2 (L) 3.4 - 5.0 g/dL    Protein Total 7.0 6.8 - 8.8 g/dL    Alkaline Phosphatase 75 40 - 150 U/L    ALT 25 0 - 50 U/L    AST 17 0 - 45 U/L   TSH with free T4 reflex   Result Value Ref Range    TSH 2.54 0.40 - 4.00 mU/L   Ammonia (on ice)   Result Value Ref Range    Ammonia <10 (L) 10 - 50 umol/L   CT Head w/o Contrast    Narrative    CT HEAD W/O CONTRAST 3/6/2017 11:27 PM      HISTORY: Altered mental status.    TECHNIQUE: Routine noncontrast head CT. Radiation  dose for this scan  was reduced using automated exposure control, adjustment of the mA  and/or kV according to patient size, or iterative reconstruction  technique.    COMPARISON: 2/2/2016.    FINDINGS: There is generalized brain atrophy. No mass, mass effect or  intracranial hemorrhage. No evidence of acute infarct.   Periventricular low attenuation is consistent with chronic small  vessel ischemic disease. The visualized paranasal sinuses are clear.      Impression    IMPRESSION:  No acute abnormality.    VANESSA LANDON MD   Chest XR,  PA & LAT    Narrative    XR CHEST 2 VW  3/7/2017 12:22 AM      HISTORY: Altered mental status.     COMPARISON: 2/2/2016.    FINDINGS: The heart size is normal. The thoracic aorta is calcified  and mildly tortuous. The lungs are hyperinflated. There is a calcified  granuloma in the left apex laterally. The lungs otherwise appear  clear. No pneumothorax or pleural effusion. Degenerative disease in  the thoracic spine.      Impression    IMPRESSION: No acute abnormality.         Assessments & Plan (with Medical Decision Making)   Xochilt Trujillo is a 89 year old female who presents to the department who presents with daughter for evaluation of waxing and waning confusion throughout the evening. She does have a previous diagnosis of cerebral infarction but she has no focal neurologic deficits and head CT without identifiable pathology. Her CBC is without leukocytosis, benign abdominal examination, without nausea/vomiting/diarrhea, and clear lungs on auscultation without respiratory infectious symptoms. Her urinalysis however is positive for leukocyte esterase and nitrite.     Patient was intermittently confused in the department, but pleasant demeanor. She will require admission for delirium with evidence of urinary tract infection. An initial dose of ceftriaxone given in the department. Consulted hospitalist Dr. Mercado referring patient presentation and workup. He agrees with plan  for full admission and antibiotic therapy regimen. No further recommendations. Temporary transition orders were placed per protocol.    The patient and her accompanying daughter have been informed of her results and the recommendation for admission. They have verbalized an understanding, all questions answered, and they are in agreement with the plan at this time.      Disclaimer: This note consists of symbols derived from keyboarding, dictation, and/or voice recognition software. As a result, there may be errors in the script that have gone undetected.  Please consider this when interpreting information found in the chart.          I have reviewed the nursing notes.    I have reviewed the findings, diagnosis, plan and need for follow up with the patient.    Current Discharge Medication List          Final diagnoses:   Altered mental status, unspecified altered mental status type   Urinary tract infection, site unspecified       3/6/2017   [facility] EMERGENCY DEPARTMENT     Walker Juares DO  03/07/17 0141
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
## 2017-03-07 NOTE — H&P
[facility]    History and Physical  Hospital Medicine       Date of Admission:  3/6/2017  Date of Service: 3/7/2017     Assessment & Plan   Xochilt Trujillo is a 89 year old female with PMH significant for HTN, prior CVA, frequent UTIs, hx of T2DM, hx of HLD, hx of GI bleed with GERD, hx of MI who now presents with altered mental status.      Acute Encephalopathy  DDx: Infection (UA with 41 WBC, urine culture growing greater than 100,000 colonies of gram negative rods) versus Metabolic derangements (thyroid, Vit B12 deficiency)  Only on baby aspirin daily.  No other use of pain medication, benzodiazepines.  No focal neuro deficits.  BP controlled.  No evidence of organ failure.  CXR without infiltrate.  CT head was negative for acute pathology or bleed  - blood cultures were not drawn, given that antibiotics have already been started, no clear utility to draw now.  - order lactic acid, TSH, free T4, B12    Urinary Tract Infection  41 WBC, large LE, positive nitrites.  Culture growing greater than 100,000 colonies of gram negative rods  - continue rocephin 1g IV, Q24  - transition to outpatient cefazolin PO  - AM CBC with platelets and differential ordered tomorrow AM  - monitor I and O's    Cerebral infarction (H), Hx of MI  Historical.  Patient has no memory. No correlating scans  - continue PTA baby aspirin      CKD (chronic kidney disease) stage 4, GFR 15-29 ml/min (H)  Chronic, at baseline.  Creatinine 1.34 on admission. Baseline 1.26 to 1.50    Hypertension goal BP (blood pressure) < 140/90  BP reviewed.  Controlled/adequate for age.  Not on medications     Hx of T2DM  Not currently utilizing medication    Hx of HLD  Not currently utilizing medication    Hx of GERD, Hx of GI bleed  Not currently utilizing medication    F: 50mL/hr 0.9NS  E: BMP in AM  N: regular diet  DVT Prophylaxis: Heparin given CrCl    Code Status: DNR / DNI    Disposition: Anticipate discharge in 2-3 days.  "Appropriate for inpatient care.    Case discussed with Dr. Stuart Burrell.  Assessment and plan as written above.    Yudelka Marquez PA-C  [facility]    History is obtained from the patient and review of the EMR.    Past Medical History    Past Medical History   Diagnosis Date     Abdominal aneurysm (H)      3-5 cm     ASHD (arteriosclerotic heart disease)      CVA (cerebral infarction) 1999     Cystocele      Erosive gastritis      Hypertension      MI (myocardial infarction) (H) 1999     Osteoporosis      Other and unspecified hyperlipidemia      Ovarian cancer (H)      PUD (peptic ulcer disease)      Skin cancer 07/2010     SCCIS of the L nasal bridge     Type 2 diabetes, HbA1C goal < 8% (H) 8/6/2012       Past Surgical History   Past Surgical History   Procedure Laterality Date     Hysterectomy, cervix status unknown       For ovarian cancer     D & c       Hc esophagoscopy, diagnostic       Colonoscopy       Extracapsular cataract extration with intraocular lens implant  1-03/9-04     left/right     Cataract iol, rt/lt         Family History    No family history on file.    History of Present Illness   Xochilt Trujillo is a 89 year old female who now presents with altered mental status. The patient presents from home.  She lives with her daughter whom is not present for this writer's interview.  The patient states she has been feeling \"fine\" as of late; reports possible urinary frequency.  No overt dysuria, hematuria, urgency.  The patient states she a \"little\" more confused.  A daughter with whom the patient does NOT live is present at bedside; she confirms that while the patient has some baseline dementia, this is far more profound than baseline.  In conversations with her sister (that with whom the patient lives), she relays that the patient became acutely confused yesterday afternoon into the evening.    The patient herself denies fever, chills, nausea, vomiting, loss of appetite, " myalgias, cold-like symptoms (congestion, pharyngitis, sinus pressure, rhinorrhea), swollen glands, headaches, lightheadedness, dizziness, chest pain, palpitations/flutters, SOB, cough, wheezes, abdominal pain, diarrhea/constipation, dysuria, hematuria, joint swelling, joint pain, leg swelling, and rashes.      Prior to Admission Medications   Prior to Admission Medications   Prescriptions Last Dose Informant Patient Reported? Taking?   LORazepam (ATIVAN) 0.5 MG tablet 3/6/2017 at 0745 Self Yes Yes   Sig: Take 0.25 mg by mouth 2 times daily as needed for other (muscle spasm)   ONE TOUCH DELICA LANCETS MISC 3/6/2017 at Unknown time Self No Yes   Sig: Blood sugar check once daily   ORDER FOR DME  Self No No   Sig: Equipment being ordered: walker with wheels and seat   aspirin 81 MG EC tablet 3/6/2017 at am Self No Yes   Sig: Take 1 tablet (81 mg) by mouth daily   blood glucose test strip 3/6/2017 at #158 Self No Yes   Sig: One touch Ultra test strips testing once daily   order for DME  Self No No   Sig: Equipment being ordered: First alert notification system.   oxyCODONE-acetaminophen (PERCOCET) 5-325 MG per tablet Past Month at Unknown time Self No Yes   Sig: Take 0.5-1 tablets by mouth every 4 hours as needed for pain      Facility-Administered Medications: None       Allergies   Allergies   Allergen Reactions     Sulfa Drugs Unknown       Social History   Social History     Social History     Marital status:      Spouse name: N/A     Number of children: N/A     Years of education: N/A     Occupational History      Retired     Social History Main Topics     Smoking status: Never Smoker     Smokeless tobacco: Never Used     Alcohol use No     Drug use: No     Sexual activity: No     Other Topics Concern     Parent/Sibling W/ Cabg, Mi Or Angioplasty Before 65f 55m? No     Social History Narrative       ROS: 10 point ROS neg other than the symptoms noted above in the HPI.    Physical Exam     BP (!) 135/91 (BP  "Location: Left arm)  Pulse 88  Temp 98.1  F (36.7  C) (Oral)  Resp 16  Ht 1.549 m (5' 1\")  Wt 45.4 kg (100 lb 1.4 oz)  SpO2 98%  BMI 18.91 kg/m2     Weight: 100 lbs 1.42 oz Body mass index is 18.91 kg/(m^2).     Constitutional: Alert and oriented x1 (self only).  Cooperative.  Appears far younger than stated age.  Appears in no acute distress.   HEENT: Oropharynx is clear and moist. No evidence of cranial trauma.  Lymph/Hematologic: No occipital, submental, submandibular, anterior or posterior cervical, or supraclavicular lymphadenopathy is appreciated.  Cardiovascular: Regular rate/ rhythm.  S1 and S2 grossly normal.  No appreciable murmur, rub, gallop.  No lower extremity edema.  Respiratory: Clear to auscultation bilaterally. Equal chest expansion.  GI: Minimal tenderness of RUQ. Otherwise soft, normal bowel sounds, no hepatosplenomegaly.  Genitourinary: Mild tenderness to right CVA.  Non-tender to percussion of the left CVA.  Musculoskeletal: Normal muscle bulk and tone.  Skin: Warm and dry, no rashes.   Neurologic: Neck supple. Cranial nerves 3-12 are grossly intact.  is symmetric. Upper and lower extremity strength is grossly intact.  No focal weakness or neuro deficit.    Data   Data reviewed today:     Recent Labs  Lab 03/06/17  2315   WBC 9.5   HGB 12.0   MCV 87      INR 0.93      POTASSIUM 4.3   CHLORIDE 107   CO2 27   BUN 31*   CR 1.42*   ANIONGAP 7   ELMIRA 8.8   *   ALBUMIN 3.2*   PROTTOTAL 7.0   BILITOTAL 0.3   ALKPHOS 75   ALT 25   AST 17   TROPI <0.015The 99th percentile for upper reference range is 0.045 ug/L.  Troponin values in the range of 0.045 - 0.120 ug/L may be associated with risks of adverse clinical events.       Recent Results (from the past 24 hour(s))   CT Head w/o Contrast    Narrative    CT HEAD W/O CONTRAST 3/6/2017 11:27 PM      HISTORY: Altered mental status.    TECHNIQUE: Routine noncontrast head CT. Radiation dose for this scan  was reduced using automated " exposure control, adjustment of the mA  and/or kV according to patient size, or iterative reconstruction  technique.    COMPARISON: 2/2/2016.    FINDINGS: There is generalized brain atrophy. No mass, mass effect or  intracranial hemorrhage. No evidence of acute infarct.   Periventricular low attenuation is consistent with chronic small  vessel ischemic disease. The visualized paranasal sinuses are clear.      Impression    IMPRESSION:  No acute abnormality.    VANESSA LANDON MD   Chest XR,  PA & LAT    Narrative    XR CHEST 2 VW  3/7/2017 12:22 AM      HISTORY: Altered mental status.     COMPARISON: 2/2/2016.    FINDINGS: The heart size is normal. The thoracic aorta is calcified  and mildly tortuous. The lungs are hyperinflated. There is a calcified  granuloma in the left apex laterally. The lungs otherwise appear  clear. No pneumothorax or pleural effusion. Degenerative disease in  the thoracic spine.      Impression    IMPRESSION: No acute abnormality.       I personally reviewed no images or EKG's today.    Yudelka Marquez PA-C  [facility]

## 2017-03-07 NOTE — ED NOTES
Awoke from nap around 1300hrs noted some confusion cleared somewhat then around dinner family noted increased confusion again pt was trying to eat with a butter knife in place of fork   No fevers no cough no others in home ill  Blood sugar 158 at home no changes changes in meds   Called nurse line recommended ER yomairaal

## 2017-03-07 NOTE — PLAN OF CARE
Problem: Goal Outcome Summary  Goal: Goal Outcome Summary  Outcome: No Change  IVF continue at 50 cc/hr. Fair oral intake. Up to chair most of shift visiting with daughter Dawna then looking through magazines. Denies pain. No urinary frequency or dysuria. Will continue on IV antibiotics as scheduled. Patient does get confused at times. Per daughter she is somewhat better today but her confusion has been getting worse over the last few months. Alert to self and date of birth. She knows she is in a hospital. Does not know date or reason for being in the hospital. Patient is pleasant and cooperative.

## 2017-03-07 NOTE — PROGRESS NOTES
"Occupational Therapy Evaluation     03/07/17 1400   Quick Adds   Type of Visit Initial Occupational Therapy Evaluation   Living Environment   Lives With child(edy), adult   Living Arrangements house   Living Environment Comment 4 stairs to enter.  Does have basement but doesn't need to go down there.  One grab bar by toilet.  Tub shower combo with seat.     General Information   Onset of Illness/Injury or Date of Surgery - Date 03/06/17   Referring Physician Ashanti   Patient/Family Goals Statement Return home   Additional Occupational Profile Info/Pertinent History of Current Problem Confusion at home.  UTI found.     Cognitive Status Examination   Orientation (knows march, does not know year)   Level of Consciousness alert   Cognitive Comment Seems to be clearning.  Has 24/7 supervision at home and likely some baseline impairments but per chart review it was much worse the other day.  Today pt says she feels better, does not feel confused.  Holds conversation well and seems to answer history well.  Does say \"where's the sugar?\" when looking for the soap and one other word finding error during the session.     Pain Assessment   Patient Currently in Pain No   Range of Motion (ROM)   ROM Comment Bilateral UEs WFL   Strength   Strength Comments Generalized weakness but functional for BADLs.    Mobility   Bed Mobility Comments Independent supine to sit   Transfer Skills   Transfer Comments CGA in room with walker.  Reports her legs feel a little weak but no wavering or LOB.  Forgets walker at sink x1 but remembers once one step away.     Toilet Transfer   Toilet Transfer Comments CGA, slow and cautious   Toileting   Level of [place]: Toilet independent   Grooming   Level of [place]: Grooming stand-by assist   Activities of Daily Living Analysis   Impairments Contributing to Impaired Activities of Daily Living cognition impaired;strength decreased   ADL Comments has 24/7 supervision, assist with all IADLs and " "supervision during basic daily tasks.     General Therapy Interventions   Planned Therapy Interventions ADL retraining;cognition   Clinical Impression   Criteria for Skilled Therapeutic Interventions Met yes, treatment indicated   OT Diagnosis weakness, mild confusion   Assessment of Occupational Performance 1-3 Performance Deficits   Identified Performance Deficits self care safety, home safety   Clinical Decision Making (Complexity) Low complexity   Therapy Frequency daily   Predicted Duration of Therapy Intervention (days/wks) 1-2 more days   Anticipated Discharge Disposition Home with Assist   Risks and Benefits of Treatment have been explained. Yes   Patient, Family & other staff in agreement with plan of care Yes   Clinical Impression Comments Pt feels slightly weak but not confused. Seems to be approaching baseline with some mild confusion and weakness that somewhat limits her safety.  Will benefit from 1-2 more days of OT to help strengthen her body for self cares and complete SLUMS to have a measureable cognitive baseline.     [facility] [employer] TM \"6 Clicks\"   2016, Trustees of [facility], under license to "[employer].  All rights reserved.   6 Clicks Short Forms Daily Activity Inpatient Short Form   [facility]  \"6 Clicks\" Daily Activity Inpatient Short Form   1. Putting on and taking off regular lower body clothing? 3 - A Little   2. Bathing (including washing, rinsing, drying)? 3 - A Little   3. Toileting, which includes using toilet, bedpan or urinal? 4 - None   4. Putting on and taking off regular upper body clothing? 4 - None   5. Taking care of personal grooming such as brushing teeth? 4 - None   6. Eating meals? 4 - None   Daily Activity Raw Score (Score out of 24.Lower scores equate to lower levels of function) 22   Total Evaluation Time   Total Evaluation Time (Minutes) 10   See Care Plan for Goals.    Brie Yeh OTR/L    "

## 2017-03-07 NOTE — PROGRESS NOTES
"2:25 PM  Pt seen and examined.  Thinks her cognition is better.  Bright and alert.  Oriented to \"Feb or March\", doesn't know year, \"Trump\"  Assume that UTI caused toxic encephalopathy superimposed on dementia.  No other etiology found.  Suspect she might be able to dc tomorrow.  "

## 2017-03-07 NOTE — CONSULTS
Reason for Referral: DC planning    Presenting Problem: Delerium    Cognitive Behavioral Status: awake, alert and oriented    Support system: family    Current Living Situation:   [facility]   Living Situation: with adult dtr   Function Status / Assistive Device: no assistive devices    Employment/ Financial/ Insurance Concerns: retired          No Insurance issues identified      Housing Concerns: No    Health Care Directives:  Not on file    Assessment: This writer met with pt, pts dtr and also spoke with pts other dtr via telephone introduced self and role. Discussed dc planning. Pt lives with adult dtrKristina. Pt recently discharged from [facility] ([phone] Fax: [phone]). Another referral will be placed for [facility] on dc. Pt will dc home when stable.       Plan: home    Angélica BURNETT, [facility], [phone]

## 2017-03-08 ENCOUNTER — APPOINTMENT (OUTPATIENT)
Dept: PHYSICAL THERAPY | Facility: CLINIC | Age: 82
DRG: 689 | End: 2017-03-08
Payer: COMMERCIAL

## 2017-03-08 ENCOUNTER — APPOINTMENT (OUTPATIENT)
Dept: OCCUPATIONAL THERAPY | Facility: CLINIC | Age: 82
DRG: 689 | End: 2017-03-08
Payer: COMMERCIAL

## 2017-03-08 LAB
ANION GAP SERPL CALCULATED.3IONS-SCNC: 7 MMOL/L (ref 3–14)
BASOPHILS # BLD AUTO: 0.1 10E9/L (ref 0–0.2)
BASOPHILS NFR BLD AUTO: 1 %
BUN SERPL-MCNC: 17 MG/DL (ref 7–30)
CALCIUM SERPL-MCNC: 8.8 MG/DL (ref 8.5–10.1)
CHLORIDE SERPL-SCNC: 114 MMOL/L (ref 94–109)
CO2 SERPL-SCNC: 25 MMOL/L (ref 20–32)
CREAT SERPL-MCNC: 1.18 MG/DL (ref 0.52–1.04)
DIFFERENTIAL METHOD BLD: NORMAL
EOSINOPHIL # BLD AUTO: 0.2 10E9/L (ref 0–0.7)
EOSINOPHIL NFR BLD AUTO: 2.4 %
ERYTHROCYTE [DISTWIDTH] IN BLOOD BY AUTOMATED COUNT: 14.4 % (ref 10–15)
GFR SERPL CREATININE-BSD FRML MDRD: 43 ML/MIN/1.7M2
GLUCOSE SERPL-MCNC: 153 MG/DL (ref 70–99)
HCT VFR BLD AUTO: 37.5 % (ref 35–47)
HGB BLD-MCNC: 12 G/DL (ref 11.7–15.7)
IMM GRANULOCYTES # BLD: 0 10E9/L (ref 0–0.4)
IMM GRANULOCYTES NFR BLD: 0.4 %
LYMPHOCYTES # BLD AUTO: 1.7 10E9/L (ref 0.8–5.3)
LYMPHOCYTES NFR BLD AUTO: 24.4 %
MCH RBC QN AUTO: 27.6 PG (ref 26.5–33)
MCHC RBC AUTO-ENTMCNC: 32 G/DL (ref 31.5–36.5)
MCV RBC AUTO: 86 FL (ref 78–100)
MONOCYTES # BLD AUTO: 0.7 10E9/L (ref 0–1.3)
MONOCYTES NFR BLD AUTO: 9.4 %
NEUTROPHILS # BLD AUTO: 4.4 10E9/L (ref 1.6–8.3)
NEUTROPHILS NFR BLD AUTO: 62.4 %
PLATELET # BLD AUTO: 265 10E9/L (ref 150–450)
POTASSIUM SERPL-SCNC: 4.4 MMOL/L (ref 3.4–5.3)
RBC # BLD AUTO: 4.35 10E12/L (ref 3.8–5.2)
SODIUM SERPL-SCNC: 146 MMOL/L (ref 133–144)
WBC # BLD AUTO: 7.1 10E9/L (ref 4–11)

## 2017-03-08 PROCEDURE — 25000128 H RX IP 250 OP 636: Performed by: PHYSICIAN ASSISTANT

## 2017-03-08 PROCEDURE — 97116 GAIT TRAINING THERAPY: CPT | Mod: GP | Performed by: PHYSICAL THERAPIST

## 2017-03-08 PROCEDURE — 40000193 ZZH STATISTIC PT WARD VISIT: Performed by: PHYSICAL THERAPIST

## 2017-03-08 PROCEDURE — 80048 BASIC METABOLIC PNL TOTAL CA: CPT | Performed by: PHYSICIAN ASSISTANT

## 2017-03-08 PROCEDURE — 25000132 ZZH RX MED GY IP 250 OP 250 PS 637: Performed by: PHYSICIAN ASSISTANT

## 2017-03-08 PROCEDURE — 40000133 ZZH STATISTIC OT WARD VISIT

## 2017-03-08 PROCEDURE — 25000128 H RX IP 250 OP 636: Performed by: STUDENT IN AN ORGANIZED HEALTH CARE EDUCATION/TRAINING PROGRAM

## 2017-03-08 PROCEDURE — 12000007 ZZH R&B INTERMEDIATE

## 2017-03-08 PROCEDURE — 97532 ZZHC OT COGNITIVE SKILLS EA 15 MIN: CPT | Mod: GO

## 2017-03-08 PROCEDURE — 36415 COLL VENOUS BLD VENIPUNCTURE: CPT | Performed by: PHYSICIAN ASSISTANT

## 2017-03-08 PROCEDURE — 97535 SELF CARE MNGMENT TRAINING: CPT | Mod: GO

## 2017-03-08 PROCEDURE — 97161 PT EVAL LOW COMPLEX 20 MIN: CPT | Mod: GP | Performed by: PHYSICAL THERAPIST

## 2017-03-08 PROCEDURE — 99233 SBSQ HOSP IP/OBS HIGH 50: CPT | Performed by: FAMILY MEDICINE

## 2017-03-08 PROCEDURE — 85025 COMPLETE CBC W/AUTO DIFF WBC: CPT | Performed by: PHYSICIAN ASSISTANT

## 2017-03-08 RX ADMIN — ASPIRIN 81 MG: 81 TABLET, COATED ORAL at 08:18

## 2017-03-08 RX ADMIN — HEPARIN SODIUM 5000 UNITS: 10000 INJECTION, SOLUTION INTRAVENOUS; SUBCUTANEOUS at 15:50

## 2017-03-08 RX ADMIN — HEPARIN SODIUM 5000 UNITS: 10000 INJECTION, SOLUTION INTRAVENOUS; SUBCUTANEOUS at 04:01

## 2017-03-08 RX ADMIN — ONDANSETRON 4 MG: 2 INJECTION INTRAMUSCULAR; INTRAVENOUS at 00:05

## 2017-03-08 RX ADMIN — Medication 0.25 MG: at 12:17

## 2017-03-08 RX ADMIN — CEFTRIAXONE 1 G: 1 INJECTION, POWDER, FOR SOLUTION INTRAMUSCULAR; INTRAVENOUS at 00:07

## 2017-03-08 NOTE — PLAN OF CARE
Problem: Goal Outcome Summary  Goal: Goal Outcome Summary  PT: Evaluation completed.  Pt able to perform bed mobility and transfers using FWW at SBA for safety, ambulated 100 feet using FWW at SBA with occasional min-A being required to navigate walker as pt tends to run into objects on L-side, but no LOB; also safely negotiated up/down 4 stairs using B railing at CGA.     Recommendation: Home with ongoing family assistance 24/7 (baseline for patient) and Home Therapy

## 2017-03-08 NOTE — PLAN OF CARE
"Problem: Goal Outcome Summary  Goal: Goal Outcome Summary  OT: Pt seen today for cognitive screen- SLUMS and AM ADLs. Pt functionally ambulated and transferred sit>stand>sit CGA-SBA, requiring v/c for FWW usage. Pt scores 6/30 on SLUMS indicating \"dementia\". Pt had difficulty with delayed recall (remembering 0/5 words), numeric calculation, visual spatial (0/4 clock drawing), and executive functioning (0/8 short story). Score explained to Pt and it's importance for supervision over daily activities, Per chart review pt receives 24 hour supervision.      OT REC: Home with 24 hour supervision, per chart review pt's daughter provides this at baseline.       "

## 2017-03-08 NOTE — PLAN OF CARE
Problem: Goal Outcome Summary  Goal: Goal Outcome Summary  Outcome: No Change  Patient more relaxed after receiving Ativan. Patient resting calmly in bed. Bed and tabs alarm in use.

## 2017-03-08 NOTE — PLAN OF CARE
Problem: Goal Outcome Summary  Goal: Goal Outcome Summary  Outcome: No Change  Patient agitated. Reoriented patient. Patient in bed with bed alarm on. Patient reminded to use call light bout patient continues to try to get out of bed on her own. Denies pain. Tabs alarm added to bed alarm. Patient given dose of Ativan. Eating muffin in bed.

## 2017-03-08 NOTE — PLAN OF CARE
Problem: Discharge Planning  Goal: Discharge Planning (Adult, OB, Behavioral, Peds)  Outcome: No Change  Patient confused, unsure as to safety, if Patient returns to daughters house.

## 2017-03-08 NOTE — PROGRESS NOTES
[facility]      Subjective:  Denies symptoms  Some confusion/agitation overnight  Daughter says she has had some recent confusion but had a sudden change    Review of Systems:  C: NEGATIVE for fever, chills, change in weight  I: NEGATIVE for worrisome rashes, moles or lesions  E: NEGATIVE for vision changes or irritation  E/M: NEGATIVE for ear, mouth and throat problems  R: NEGATIVE for significant cough or SOB  B: NEGATIVE for masses, tenderness or discharge  CV: NEGATIVE for chest pain, palpitations or peripheral edema  GI: NEGATIVE for nausea, abdominal pain, heartburn, or change in bowel habits  : NEGATIVE for frequency, dysuria, or hematuria  M: NEGATIVE for significant arthralgias or myalgia  N: NEGATIVE for weakness, dizziness or paresthesias  E: NEGATIVE for temperature intolerance, skin/hair changes  H: NEGATIVE for bleeding problems  P: NEGATIVE for changes in mood or affect    Physical Exam:  Vitals Were Reviewed    Patient Vitals for the past 16 hrs:   BP Temp Temp src Heart Rate Resp SpO2 Weight   03/08/17 0518 - - - - - - 45.5 kg (100 lb 5 oz)   03/08/17 0404 122/74 98.2  F (36.8  C) Oral 88 18 97 % -   03/07/17 2354 124/74 98.2  F (36.8  C) Oral 89 16 95 % -   03/07/17 1945 143/76 97.9  F (36.6  C) Oral 102 18 96 % -         Intake/Output Summary (Last 24 hours) at 03/08/17 1047  Last data filed at 03/08/17 0741   Gross per 24 hour   Intake              534 ml   Output             1725 ml   Net            -1191 ml       GENERAL APPEARANCE: healthy, alert and no distress  EYES: conjunctiva clear, eyes grossly normal  RESP: lungs clear to auscultation - no rales, rhonchi or wheezes  CV: regular rate and rhythm, normal S1 S2, no S3 or S4 and no murmur, click or rub   ABDOMEN: soft, nontender, no HSM or masses and bowel sounds normal  MS: no clubbing, cyanosis; no edema  SKIN: clear without significant rashes or lesions  NEURO: disoriented    Lab:  Recent Labs   Lab Test   03/08/17   0620  03/06/17   2315   NA  146*  141   POTASSIUM  4.4  4.3   CHLORIDE  114*  107   CO2  25  27   ANIONGAP  7  7   GLC  153*  112*   BUN  17  31*   CR  1.18*  1.42*   ELMIRA  8.8  8.8     CBC RESULTS:   Recent Labs   Lab Test  03/08/17   0620  03/07/17   1154  03/06/17   2315   WBC  7.1   --   9.5   RBC  4.35   --   4.30   HGB  12.0   --   12.0   HCT  37.5   --   37.2   PLT  265  277  275       Results for orders placed or performed during the hospital encounter of 03/06/17 (from the past 24 hour(s))   Social Work IP Consult    Narrative    Angélica Rodríguez, FERNANDO     3/7/2017  1:26 PM      Reason for Referral: DC planning    Presenting Problem: Delerium    Cognitive Behavioral Status: awake, alert and oriented    Support system: family    Current Living Situation:   Home Setting: [facility]   Living Situation: with adult dtr   Function Status / Assistive Device: no assistive devices    Employment/ Financial/ Insurance Concerns: retired          No Insurance issues identified      Housing Concerns: No    Health Care Directives:  Not on file    Assessment: This writer met with pt, pts dtr and also spoke with   pts other dtr via telephone introduced self and role. Discussed   dc planning. Pt lives with adult dtrKristina. Pt recently   discharged from [facility] ([phone] Fax:   [phone]). Another referral will be placed for [facility] on dc.   Pt will dc home when stable.       Plan: home    Angélica Rodríguez MSW, [facility] Health CareSW, [phone]                     Platelet count   Result Value Ref Range    Platelet Count 277 150 - 450 10e9/L   Lactic acid whole blood   Result Value Ref Range    Lactic Acid 0.8 0.7 - 2.1 mmol/L   TSH   Result Value Ref Range    TSH 3.13 0.40 - 4.00 mU/L   T4 free   Result Value Ref Range    T4 Free 1.02 0.76 - 1.46 ng/dL   Vitamin B12   Result Value Ref Range    Vitamin B12 415 193 - 986 pg/mL   Basic metabolic panel   Result Value Ref Range    Sodium 146 (H) 133 - 144  mmol/L    Potassium 4.4 3.4 - 5.3 mmol/L    Chloride 114 (H) 94 - 109 mmol/L    Carbon Dioxide 25 20 - 32 mmol/L    Anion Gap 7 3 - 14 mmol/L    Glucose 153 (H) 70 - 99 mg/dL    Urea Nitrogen 17 7 - 30 mg/dL    Creatinine 1.18 (H) 0.52 - 1.04 mg/dL    GFR Estimate 43 (L) >60 mL/min/1.7m2    GFR Estimate If Black 52 (L) >60 mL/min/1.7m2    Calcium 8.8 8.5 - 10.1 mg/dL   CBC with platelets differential   Result Value Ref Range    WBC 7.1 4.0 - 11.0 10e9/L    RBC Count 4.35 3.8 - 5.2 10e12/L    Hemoglobin 12.0 11.7 - 15.7 g/dL    Hematocrit 37.5 35.0 - 47.0 %    MCV 86 78 - 100 fl    MCH 27.6 26.5 - 33.0 pg    MCHC 32.0 31.5 - 36.5 g/dL    RDW 14.4 10.0 - 15.0 %    Platelet Count 265 150 - 450 10e9/L    Diff Method Automated Method     % Neutrophils 62.4 %    % Lymphocytes 24.4 %    % Monocytes 9.4 %    % Eosinophils 2.4 %    % Basophils 1.0 %    % Immature Granulocytes 0.4 %    Absolute Neutrophil 4.4 1.6 - 8.3 10e9/L    Absolute Lymphocytes 1.7 0.8 - 5.3 10e9/L    Absolute Monocytes 0.7 0.0 - 1.3 10e9/L    Absolute Eosinophils 0.2 0.0 - 0.7 10e9/L    Absolute Basophils 0.1 0.0 - 0.2 10e9/L    Abs Immature Granulocytes 0.0 0 - 0.4 10e9/L       Assessment and Plan:    Xochilt Trujillo is a 89 year old female with PMH significant for HTN, prior CVA, frequent UTIs, hx of T2DM, hx of HLD, hx of GI bleed with GERD, hx of MI who now presents with altered mental status.        Acute Encephalopathy--superimposed on chronic dementia  DDx: Infection (UA with 41 WBC, urine culture growing greater than 100,000 colonies of gram negative rods) versus Metabolic derangements (thyroid, Vit B12 deficiency)  Only on baby aspirin daily. No other use of pain medication, benzodiazepines. No focal neuro deficits. BP controlled. No evidence of organ failure. CXR without infiltrate. CT head was negative for acute pathology or bleed  March 8, 2017 confusion and agitation overnight      Urinary Tract Infection  41 WBC, large LE, positive  nitrites. Culture growing greater than 100,000 colonies of gram negative rods  - continue rocephin 1g IV, Q24  - transition to outpatient cefazolin PO  - AM CBC with platelets and differential ordered tomorrow AM  March 8, 2017 gm neg jaylyn-await sens     Cerebral infarction (H), Hx of MI  Historical. Patient has no memory. No correlating scans  - continue PTA baby aspirin      CKD (chronic kidney disease) stage 4, GFR 15-29 ml/min (H)  Chronic, at baseline. Creatinine 1.34 on admission. Baseline 1.26 to 1.50  March 8, 2017 creat 1.18     Hypertension goal BP (blood pressure) < 140/90  BP reviewed. Controlled/adequate for age. Not on medications      Hx of T2DM  Not currently utilizing medication     Hx of HLD  Not currently utilizing medication     Hx of GERD, Hx of GI bleed  Not currently utilizing medication     F: 50mL/hr 0.9NS  E: BMP in AM  N: regular diet  DVT Prophylaxis: Heparin given CrCl     Code Status: DNR / DNI     Disposition:   It is unclear how much the uti is contributing to the more acute confusion.  Continue uti rx  Discussed MRI--but would probably require sedation-will defer for now  Will dc tomorrow, hope to have sens on UC back.   Daughter planning on taking home  Explained that this may be the new normal.

## 2017-03-08 NOTE — PROGRESS NOTES
Name: Xochilt Trujillo    MRN#: [id number]    Reason for Hospitalization: Altered mental status, unspecified altered mental status type [R41.82]  Urinary tract infection, site unspecified [N39.0]    Discharge Date: 3/8/2017    Patient / Family response to discharge plan: Pt will most likely discharge today to home with [facility] ([phone] Fax: [phone]) in care of daughter.     Follow-Up Appt: Future Appointments  Date Time Provider Department Center   3/8/2017 11:30 AM Diana Leo PT [facility]   4/18/2017 9:30 AM Manju Joaquin MD [facility]       Other Providers (Care Coordinator, County Services, PCA services etc): No    Discharge Disposition: home    FERNANDO Garcia, [facility] [phone]       addendum: pt will dc home tomorrow. FERNANDO Garcia -[phone]

## 2017-03-08 NOTE — PROGRESS NOTES
Physical Therapy Evaluation:     03/08/17 1300   Quick Adds   Type of Visit Initial PT Evaluation       Present no   Living Environment   Lives With child(edy), adult  (daughter - Kristina)   Living Arrangements house  (rambler/ranch)   Home Accessibility no concerns   Number of Stairs to Enter Home 0   Number of Stairs Within Home 0   Transportation Available family or friend will provide   Living Environment Comment 4 stairs to enter. Does have basement but doesn't need to go down there. One grab bar by toilet. Tub shower combo with seat.    Self-Care   Current Activity Tolerance fair   Regular Exercise yes  (was just recently discharged from  per chart review)   Equipment Currently Used at Home walker, rolling   Functional Level Prior   Ambulation 3-->assistive equipment and person   Transferring 3-->assistive equipment and person   Cognition 2 - difficulty with organizing thoughts   Fall history within last six months no  (per chart review)   Which of the above functional risks had a recent onset or change? none   Prior Functional Level Comment Per chart review and discussion with RN who had previously talked to pt's daughter - someone is always with patient and she uses a walker for all ambulation   General Information   Onset of Illness/Injury or Date of Surgery - Date 03/07/17   Referring Physician Yudelka Marquez PA-C   Patient/Family Goals Statement pt didn't generage any therapy goals; very pleasant and agreeable to working with therapist   Pertinent History of Current Problem (include personal factors and/or comorbidities that impact the POC) pt with severse baseline dementia but good family support and lives with daughter   Precautions/Limitations fall precautions  (due to age, dementia and slight difficulty navigating walker)   General Info Comments Patient received sitting in family lounge with 1:1 sitter present, pt very pleasant throughout; pleasantly confused   Cognitive  "Status Examination   Orientation person  (\"airport\" and stated \"5\" for the year)   Level of Consciousness alert   Follows Commands and Answers Questions able to follow single-step instructions   Personal Safety and Judgment impaired   Memory impaired   Cognitive Comment baseline dementia; pleasantly confused with therapist throughout   Pain Assessment   Patient Currently in Pain No   Integumentary/Edema   Integumentary/Edema no deficits were identifed   Posture    Posture Kyphosis;Forward head position   Posture Comments slight   Range of Motion (ROM)   ROM Quick Adds No deficits were identified   ROM Comment B hip flex, knee flex/ext, DF/PF WFL   Strength   Strength Comments BLE 4-/5 throughout; age related deconditioning and weakness but very functional    Bed Mobility   Bed Mobility Comments supervision for safety   Transfer Skills   Transfer Comments SBA for safety for STS up to walker   Gait   Gait Comments Pt ambulated 25 feet using FWW at close SBA, slow but steady gait   Balance   Balance Comments good static standing balance with at least single UE support onto walker or surface, close SBA provided for safety throughout   Sensory Examination   Sensory Perception no deficits were identified   Sensory Perception Comments BLE light touch grossly intact   Coordination   Coordination no deficits were identified   Muscle Tone   Muscle Tone no deficits were identified   General Therapy Interventions   Planned Therapy Interventions gait training;strengthening;progressive activity/exercise   Clinical Impression   Criteria for Skilled Therapeutic Intervention yes, treatment indicated   PT Diagnosis deconditioning   Influenced by the following impairments decreased LE strength; slight decreased activity tolerance but likely near baseline   Functional limitations due to impairments transfers and ambulation   Clinical Presentation Stable/Uncomplicated   Clinical Presentation Rationale clinical judgement; pt likely " "at/near baseline and presents with appropriate age-related deconditioning, very functional   Clinical Decision Making (Complexity) Low complexity   Therapy Frequency` daily   Predicted Duration of Therapy Intervention (days/wks) 2 days   Anticipated Equipment Needs at Discharge (none)   Anticipated Discharge Disposition Home with Home Therapy   Risk & Benefits of therapy have been explained Yes   Patient, Family & other staff in agreement with plan of care Yes   [facility] TM \"6 Clicks\"   2016, Trustees of [facility], under license to [employer].  All rights reserved.   6 Clicks Short Forms Basic Mobility Inpatient Short Form   [facility]  \"6 Clicks\" V.2 Basic Mobility Inpatient Short Form   1. Turning from your back to your side while in a flat bed without using bedrails? 4 - None   2. Moving from lying on your back to sitting on the side of a flat bed without using bedrails? 4 - None   3. Moving to and from a bed to a chair (including a wheelchair)? 3 - A Little   4. Standing up from a chair using your arms (e.g., wheelchair, or bedside chair)? 4 - None   5. To walk in hospital room? 3 - A Little   6. Climbing 3-5 steps with a railing? 3 - A Little   Basic Mobility Raw Score (Score out of 24.Lower scores equate to lower levels of function) 21   Total Evaluation Time   Total Evaluation Time (Minutes) 5     "

## 2017-03-08 NOTE — PLAN OF CARE
Problem: Goal Outcome Summary  Goal: Goal Outcome Summary  Outcome: No Change  Patient confused to time place and situation. She knows her name and birthday, but is unsure of where she is. She became agitated about breakfast and wanted to talk to the . Can be distracted and redirected. Assist of one and walker. One on one sitter.

## 2017-03-08 NOTE — TELEPHONE ENCOUNTER
Per pt daughter, Pt was admitted to the hospital for UTI and Dementia.     Erica Carrasco, [place]

## 2017-03-09 VITALS
HEART RATE: 91 BPM | DIASTOLIC BLOOD PRESSURE: 69 MMHG | RESPIRATION RATE: 16 BRPM | OXYGEN SATURATION: 94 % | BODY MASS INDEX: 19.27 KG/M2 | TEMPERATURE: 98.4 F | SYSTOLIC BLOOD PRESSURE: 127 MMHG | WEIGHT: 102.07 LBS | HEIGHT: 61 IN

## 2017-03-09 LAB
BACTERIA SPEC CULT: ABNORMAL
MICRO REPORT STATUS: ABNORMAL
MICROORGANISM SPEC CULT: ABNORMAL
SPECIMEN SOURCE: ABNORMAL

## 2017-03-09 PROCEDURE — 25000128 H RX IP 250 OP 636: Performed by: STUDENT IN AN ORGANIZED HEALTH CARE EDUCATION/TRAINING PROGRAM

## 2017-03-09 PROCEDURE — 99239 HOSP IP/OBS DSCHRG MGMT >30: CPT | Performed by: FAMILY MEDICINE

## 2017-03-09 PROCEDURE — 25000132 ZZH RX MED GY IP 250 OP 250 PS 637: Performed by: PHYSICIAN ASSISTANT

## 2017-03-09 PROCEDURE — 25000128 H RX IP 250 OP 636: Performed by: PHYSICIAN ASSISTANT

## 2017-03-09 RX ORDER — LEVOFLOXACIN 500 MG/1
500 TABLET, FILM COATED ORAL DAILY
Qty: 5 TABLET | Refills: 0 | Status: SHIPPED | OUTPATIENT
Start: 2017-03-09 | End: 2017-03-09

## 2017-03-09 RX ORDER — LEVOFLOXACIN 250 MG/1
250 TABLET, FILM COATED ORAL DAILY
Qty: 5 TABLET | Refills: 0 | Status: SHIPPED | OUTPATIENT
Start: 2017-03-09 | End: 2017-03-09

## 2017-03-09 RX ORDER — LEVOFLOXACIN 250 MG/1
250 TABLET, FILM COATED ORAL
Qty: 5 TABLET | Refills: 0 | COMMUNITY
Start: 2017-03-09 | End: 2017-03-10 | Stop reason: ALTCHOICE

## 2017-03-09 RX ADMIN — CEFTRIAXONE 1 G: 1 INJECTION, POWDER, FOR SOLUTION INTRAMUSCULAR; INTRAVENOUS at 00:57

## 2017-03-09 RX ADMIN — ASPIRIN 81 MG: 81 TABLET, COATED ORAL at 07:47

## 2017-03-09 RX ADMIN — HEPARIN SODIUM 5000 UNITS: 10000 INJECTION, SOLUTION INTRAVENOUS; SUBCUTANEOUS at 03:58

## 2017-03-09 NOTE — DISCHARGE INSTRUCTIONS
Take levaquin 250 mg tabs daily for five days starting tomorrow.  Follow up with your primary MD in one week.

## 2017-03-09 NOTE — PLAN OF CARE
Problem: Goal Outcome Summary  Goal: Goal Outcome Summary  Outcome: Adequate for Discharge Date Met:  03/09/17  Occupational Therapy Discharge Summary     Reason for therapy discharge:    Discharged to home with home therapy.     Progress towards therapy goal(s). See goals on Care Plan in [facility] electronic health record for goal details.  Goals met     Therapy recommendation(s):    home with 24/7 supervision/assist and home therapy

## 2017-03-09 NOTE — PLAN OF CARE
Problem: Goal Outcome Summary  Goal: Goal Outcome Summary  Outcome: No Change  Patient continues to be confused. Slept much of night but forgetful and doesn't remember where she is, what year it is. Patient needed reminding of how to use walker and coaching in bathroom. IV Antibiotics given. Patient had NST sitting at bedside during night.

## 2017-03-09 NOTE — PROGRESS NOTES
[facility] DISCHARGE NOTE    Patient discharged to home at 12:52 PM via wheel chair. Accompanied by daughter and staff. Discharge instructions reviewed with daughter, opportunity offered to ask questions. Prescriptions sent to patients preferred pharmacy. All belongings sent with patient.    Eugenia Son

## 2017-03-09 NOTE — PROGRESS NOTES
[facility]ist Service      Subjective:  No complaints  confused    Review of Systems:  C: NEGATIVE for fever, chills, change in weight  E/M: NEGATIVE for ear, mouth and throat problems  R: NEGATIVE for significant cough or SOB  CV: NEGATIVE for chest pain, palpitations or peripheral edema    Physical Exam:  Vitals Were Reviewed    Patient Vitals for the past 16 hrs:   BP Temp Temp src Pulse Heart Rate Resp SpO2 Weight   03/09/17 0421 127/69 98.4  F (36.9  C) Oral 91 - 16 94 % 46.3 kg (102 lb 1.2 oz)   03/09/17 0346 - - - - - - 93 % -   03/08/17 2241 150/90 98  F (36.7  C) Oral - 91 16 95 % -         Intake/Output Summary (Last 24 hours) at 03/09/17 1059  Last data filed at 03/08/17 2259   Gross per 24 hour   Intake                0 ml   Output              500 ml   Net             -500 ml       GENERAL APPEARANCE: healthy, alert and no distress  EYES: conjunctiva clear, eyes grossly normal  RESP: lungs clear to auscultation - no rales, rhonchi or wheezes  CV: regular rate and rhythm, normal S1 S2, no S3 or S4 and no murmur, click or rub   ABDOMEN: soft, nontender, no HSM or masses and bowel sounds normal  MS: no clubbing, cyanosis; no edema  SKIN: clear without significant rashes or lesions  NEURO: confused , but no focal findings    Lab:  Recent Labs   Lab Test  03/08/17   0620  03/06/17   2315   NA  146*  141   POTASSIUM  4.4  4.3   CHLORIDE  114*  107   CO2  25  27   ANIONGAP  7  7   GLC  153*  112*   BUN  17  31*   CR  1.18*  1.42*   ELMIRA  8.8  8.8     CBC RESULTS:   Recent Labs   Lab Test  03/08/17   0620  03/07/17   1154  03/06/17   2315   WBC  7.1   --   9.5   RBC  4.35   --   4.30   HGB  12.0   --   12.0   HCT  37.5   --   37.2   PLT  265  277  275       No results found for this or any previous visit (from the past 24 hour(s)).    Assessment and Plan:    Principal Problem:    Delirium  Xochilt Trujillo is a 89 year old female with PMH significant for HTN, prior CVA, frequent UTIs, hx of T2DM,  hx of HLD, hx of GI bleed with GERD, hx of MI who now presents with altered mental status.          Acute Encephalopathy--superimposed on chronic dementia  DDx: Infection (UA with 41 WBC, urine culture growing greater than 100,000 colonies of gram negative rods) versus Metabolic derangements (thyroid, Vit B12 deficiency)  Only on baby aspirin daily. No other use of pain medication, benzodiazepines. No focal neuro deficits. BP controlled. No evidence of organ failure. CXR without infiltrate. CT head was negative for acute pathology or bleed  March 8, 2017 confusion and agitation overnight  March 9, 2017 confusion continues        Urinary Tract Infection  41 WBC, large LE, positive nitrites. Culture growing greater than 100,000 colonies of gram negative rods  - continue rocephin 1g IV, Q24  - transition to outpatient cefazolin PO  - AM CBC with platelets and differential ordered tomorrow AM  March 9, 2017 e coli sen rocephin and levaquin      Cerebral infarction (H), Hx of MI  Historical. Patient has no memory. No correlating scans  - continue PTA baby aspirin      CKD (chronic kidney disease) stage 4, GFR 15-29 ml/min (H)  Chronic, at baseline. Creatinine 1.34 on admission. Baseline 1.26 to 1.50  March 8, 2017 creat 1.18      Hypertension goal BP (blood pressure) < 140/90  BP reviewed. Controlled/adequate for age. Not on medications       Hx of T2DM  Not currently utilizing medication      Hx of HLD  Not currently utilizing medication      Hx of GERD, Hx of GI bleed  Not currently utilizing medication      F: 50mL/hr 0.9NS  E: BMP in AM  N: regular diet  DVT Prophylaxis: Heparin given CrCl      Code Status: DNR / DNI      Disposition:   It is unclear how much the uti is contributing to the more acute confusion.  Continue uti rx  Discussed MRI--but would probably require sedation-will defer for now  dc

## 2017-03-09 NOTE — DISCHARGE SUMMARY
HISTORY OF PRESENT ILLNESS:  Xochilt Trujillo is a 89-year-old female with a history of hypertension and previous CVA, frequent UTIs, diabetes mellitus type 2, on no medications, hyperlipidemia, GI bleed, GERD and some chronic problems with cognition.  The patient presented from home.  She lives with one of her daughters.  She has had some confusion recently, but she became more confused suddenly.  She presented for evaluation.      Upon admission, she was found to have a UTI and she did grow out E. coli that was sensitive to most antibiotics.  She was treated with Rocephin, and she is going to discharge on Levaquin for 5 days.      We really found no other problem that may have precipitated her worsening confusion.  Ultimately, I thought that this may simply be a sudden worsening of chronic dementia.  While she was in the hospital I did discuss doing an MRI of her brain to rule out a recurrent infarct as a cause of her confusion.  I felt that the patient probably would need significant sedation for that.  The family felt we should defer that and I did.      Ultimately, the family felt that they wanted to try taking her back home.  They do understand that she is getting close to needing nursing home care.      ASSESSMENT:   1.  Acute on chronic encephalopathy possibly related to urinary tract infection versus an acute worsening of a chronic dementing process.   2.  Urinary tract infection with Escherichia coli.   3.  History of cerebral infarction.   4.  Chronic kidney disease -- stable.   5.  Hypertension.   6.  Diabetes mellitus, not currently on medications.   7.  History of hyperlipidemia.   8.  History of gastroesophageal reflux disease.      PLAN:  The patient is going to discharge to home.  I think she may need nursing home care sometime soon, but the family wants to try at home.  I have asked the family to follow up with the primary physician in 1 week.  We will complete UTI treatment with Levaquin 250 mg  daily.  She will take this for 5 days after discharge starting the day after discharge.  While in the hospital I did not see dramatic improvement on antibiotics as the UTI was treated so I am not convinced that that was a big part of her acute problem.     Discharge Medication List as of 3/9/2017  2:48 PM      START taking these medications    Details   levofloxacin (LEVAQUIN) 250 MG tablet Take 1 tablet (250 mg) by mouth every 48 hours, Disp-5 tablet, R-0, Historical         CONTINUE these medications which have NOT CHANGED    Details   LORazepam (ATIVAN) 0.5 MG tablet Take 0.25 mg by mouth 2 times daily as needed for other (muscle spasm), Historical      oxyCODONE-acetaminophen (PERCOCET) 5-325 MG per tablet Take 0.5-1 tablets by mouth every 4 hours as needed for pain, Disp-28 tablet, R-0, Local Print      aspirin 81 MG EC tablet Take 1 tablet (81 mg) by mouth daily, Disp-90 tablet, R-3, OTC      blood glucose test strip One touch Ultra test strips testing once dailyDisp-3 Box, [id number]      ONE TOUCH DELICA LANCETS MISC Blood sugar check once daily, Disp-30 each, R-5, E-Prescribe      !! order for DME Equipment being ordered: First alert notification system.Disp-1 Device, R-0, Local Print      !! ORDER FOR DME Equipment being ordered: walker with wheels and seatDisp-1 Device, R-0, Normal       !! - Potential duplicate medications found. Please discuss with provider.        Unresulted Labs Ordered in the Past 30 Days of this Admission     No orders found from 2017 to 3/7/2017.              Greater than 30 minutes spent on this.         CHARLY SAAB MD             D: 2017 11:11   T: 2017 15:19   MT:       Name:     ESTEE SHIPLEY   MRN:      [id number]        Account:        [id number]   :      1924           Admit Date:                                       Discharge Date: 2017      Document: [id number]
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
## 2017-03-09 NOTE — PLAN OF CARE
Problem: Goal Outcome Summary  Goal: Goal Outcome Summary  Physical Therapy Discharge Summary     Reason for therapy discharge:    Discharged to home with home therapy.     Progress towards therapy goal(s). See goals on Care Plan in [facility] electronic health record for goal details.  Goals partially met.  Barriers to achieving goals:   discharge from facility.     Therapy recommendation(s):    Continued therapy is recommended.  Rationale/Recommendations:  Improve mobility and safety.

## 2017-03-10 ENCOUNTER — TELEPHONE (OUTPATIENT)
Dept: FAMILY MEDICINE | Facility: CLINIC | Age: 82
End: 2017-03-10

## 2017-03-10 ENCOUNTER — CARE COORDINATION (OUTPATIENT)
Dept: CASE MANAGEMENT | Facility: CLINIC | Age: 82
End: 2017-03-10

## 2017-03-10 DIAGNOSIS — M62.838 MUSCLE SPASM: Primary | ICD-10-CM

## 2017-03-10 RX ORDER — LORAZEPAM 0.5 MG/1
0.25 TABLET ORAL 2 TIMES DAILY PRN
Qty: 15 TABLET | Refills: 0 | Status: SHIPPED | OUTPATIENT
Start: 2017-03-10 | End: 2017-04-07

## 2017-03-10 NOTE — PROGRESS NOTES
Clinic Care Coordination Contact  OUTREACH    Referral Information:  Referral Source: IP/TCU Report  Reason for Contact: Hospital discharge  Care Conference: No     Universal Utilization:   ED Visits in last year: 3  Hospital visits in last year: 1  Last PCP appointment: 11/18/16  Multiple Providers or Specialists: YES    Clinical Concerns:  Current Medical Concerns:   Patient Active Problem List   Diagnosis     Iron deficiency anemia     Abdominal aneurysm (H)     Cerebral infarction (H)     Ovarian cancer (H)     Erosive gastritis     MI (myocardial infarction) (H)     Sigmoid diverticulitis     CKD (chronic kidney disease) stage 4, GFR 15-29 ml/min (H)     Diabetes mellitus type 2, uncomplicated (H)     Hypertension goal BP (blood pressure) < 140/90     Advanced directives, counseling/discussion     Health Care Home     Pseudophakia with Yag Caps, OU     PVD (posterior vitreous detachment) OU     Age-related osteoporosis without current pathological fracture     Dermatochalasis, unspecified laterality     Delirium     Gastrointestinal hemorrhage     Hyperlipidemia     Urinary tract infection     Muscle spasm        Current Behavioral Concerns: N/A    Education Provided to patient: N/A   Clinical Pathway Name: None  Clinical Pathway: None    Medication Management:  Daughter sets up and administers all medications     Functional Status:  Mobility Status: Independent w/Device  Equipment Currently Used at Home: walker, rolling, walker, standard  Transportation: Daughter  Requires assistance for all ADLs/IADLs. She is able to feed herself. Some assistance to use bathroom.        Psychosocial:  Current living arrangement:: I live in a private home with family- daughter Taz. No stairs.  Financial/Insurance: [facility]. Has about $25,000 in assets to spend down to meet Medical Assistance requirements. Discussed proper spenddown techniques.  7 children, most live nearby. Has had LTCC from the [facility]  Waiver. Daughter believes she qualified for 10 hours of PCA services per week.     Resources and Interventions:  Current Resources: Informal support from family. [facility] to come out today.  Frequency of Care Coordination: AS NEEDED        Plan: CC to call patient's daughter next week to ensure start of home care.    ANGÉLICA Dexter  FPTRENTON   [phone]  March 10, 2017

## 2017-03-13 ENCOUNTER — CARE COORDINATION (OUTPATIENT)
Dept: CASE MANAGEMENT | Facility: CLINIC | Age: 82
End: 2017-03-13

## 2017-03-13 ENCOUNTER — TELEPHONE (OUTPATIENT)
Dept: FAMILY MEDICINE | Facility: CLINIC | Age: 82
End: 2017-03-13

## 2017-03-13 NOTE — PROGRESS NOTES
Clinic Care Coordination Contact  Care Team Conversations    Spoke with patient who reports home care did come out to see her this weekend. She reports her daughter is not home and CC should talk with her.  CC left message for patient's daughter.     Plan: Home care has opened. CC to follow up in 3 weeks or sooner if needs arise.    ANGÉLICA Dexter   [phone]  March 13, 2017

## 2017-03-13 NOTE — TELEPHONE ENCOUNTER
S:  Homecare assessment completed on 3-10-17  B:  89 year old recently hospitalized for UTI and delirium. History of DMII, HTN.   A:  Client in need of skilled homecare services  R:  [facility] if you do not agree with the following plan: SN 1-2x/week for symptom management and teaching. PT eval/treat for falls prevention and strengthening. OT eval/treat for cognition and endurance. HHA 2x/week for safety with ADLs.   Francy Leo LPN

## 2017-03-15 ENCOUNTER — OFFICE VISIT (OUTPATIENT)
Dept: FAMILY MEDICINE | Facility: CLINIC | Age: 82
End: 2017-03-15
Payer: COMMERCIAL

## 2017-03-15 VITALS
HEART RATE: 80 BPM | WEIGHT: 99.8 LBS | SYSTOLIC BLOOD PRESSURE: 132 MMHG | BODY MASS INDEX: 18.84 KG/M2 | HEIGHT: 61 IN | DIASTOLIC BLOOD PRESSURE: 70 MMHG

## 2017-03-15 DIAGNOSIS — L57.0 AK (ACTINIC KERATOSIS): ICD-10-CM

## 2017-03-15 DIAGNOSIS — F05 DELIRIUM SECONDARY TO MULTIPLE MEDICAL PROBLEMS: ICD-10-CM

## 2017-03-15 DIAGNOSIS — E11.9 TYPE 2 DIABETES MELLITUS WITHOUT COMPLICATION, WITHOUT LONG-TERM CURRENT USE OF INSULIN (H): Chronic | ICD-10-CM

## 2017-03-15 DIAGNOSIS — F01.50 VASCULAR DEMENTIA WITHOUT BEHAVIORAL DISTURBANCE (H): ICD-10-CM

## 2017-03-15 DIAGNOSIS — N39.0 URINARY TRACT INFECTION WITHOUT HEMATURIA, SITE UNSPECIFIED: Primary | ICD-10-CM

## 2017-03-15 PROCEDURE — 99495 TRANSJ CARE MGMT MOD F2F 14D: CPT | Performed by: FAMILY MEDICINE

## 2017-03-15 NOTE — NURSING NOTE
"Chief Complaint   Patient presents with     Hospital F/U     [facility]. UTI. 3/6-3/9.       Initial /70 (BP Location: Left arm, Patient Position: Chair, Cuff Size: Adult Regular)  Pulse 80  Ht 5' 1\" (1.549 m)  Wt 99 lb 12.8 oz (45.3 kg)  BMI 18.86 kg/m2 Estimated body mass index is 18.86 kg/(m^2) as calculated from the following:    Height as of this encounter: 5' 1\" (1.549 m).    Weight as of this encounter: 99 lb 12.8 oz (45.3 kg).  Medication Reconciliation: complete   Lexis Tian LPN    "

## 2017-03-15 NOTE — MR AVS SNAPSHOT
After Visit Summary   3/15/2017    Xochilt Trujillo    MRN: [id number]           Patient Information     Date Of Birth          5/28/1924        Visit Information        Provider Department      3/15/2017 11:00 AM Fran Haile MD [facility]        Today's Diagnoses     Urinary tract infection without hematuria, site unspecified    -  1    AK (actinic keratosis)        Vascular dementia without behavioral disturbance        Type 2 diabetes mellitus without complication, without long-term current use of insulin (H)        Delirium secondary to multiple medical problems          Care Instructions      For intact skin, can use a simple cream like Eucerin, Vanicream or Aquaphor.    Make eye appointment.,    Make dermatology appointment for skin on nose.     Follow-up with neurology on EEG and whether or not additional workup or treatments for memory would be helpful    Let us know if you need additional services for home.         Follow-ups after your visit        Additional Services     DERMATOLOGY REFERRAL       Your provider has referred you to: ELIZABETH: [facility] [phone]   [email or web address]    Please be aware that coverage of these services is subject to the terms and limitations of your health insurance plan.  Call member services at your health plan with any benefit or coverage questions.      Please bring the following with you to your appointment:    (1) Any X-Rays, CTs or MRIs which have been performed.  Contact the facility where they were done to arrange for  prior to your scheduled appointment.    (2) List of current medications  (3) This referral request   (4) Any documents/labs given to you for this referral                  Follow-up notes from your care team     Return in about 3 months (around 6/15/2017).      Your next 10 appointments already scheduled     Apr 18, 2017  9:30 AM CDT   Return Visit with  "Manju Joaquin MD   [facility]    [address] [place] [address] [phone]              Who to contact     Normal or non-critical lab and imaging results will be communicated to you by MyChart, letter or phone within 4 business days after the clinic has received the results. If you do not hear from us within 7 days, please contact the clinic through MyChart or phone. If you have a critical or abnormal lab result, we will notify you by phone as soon as possible.  Submit refill requests through [employer] or call your pharmacy and they will forward the refill request to us. Please allow 3 business days for your refill to be completed.          If you need to speak with a  for additional information , please call: [phone]           Additional Information About Your Visit        [employer] Information     [employer] gives you secure access to your electronic health record. If you see a primary care provider, you can also send messages to your care team and make appointments. If you have questions, please call your primary care clinic.  If you do not have a primary care provider, please call [phone] and they will assist you.        Care EveryWhere ID     This is your Care EveryWhere ID. This could be used by other organizations to access your [facility] medical records  [id number]        Your Vitals Were     Pulse Height BMI (Body Mass Index)             80 5' 1\" (1.549 m) 18.86 kg/m2          Blood Pressure from Last 3 Encounters:   03/15/17 132/70   03/09/17 127/69   01/09/17 118/67    Weight from Last 3 Encounters:   03/15/17 99 lb 12.8 oz (45.3 kg)   03/09/17 102 lb 1.2 oz (46.3 kg)   11/18/16 98 lb (44.5 kg)              We Performed the Following     DERMATOLOGY REFERRAL          Today's Medication Changes          These changes are accurate as of: 3/15/17 12:36 PM.  If you have any questions, ask your nurse or doctor.             "   These medicines have changed or have updated prescriptions.        Dose/Directions    LORazepam 0.5 MG tablet   Commonly known as:  ATIVAN   This may have changed:  Another medication with the same name was removed. Continue taking this medication, and follow the directions you see here.   Used for:  Muscle spasm   Changed by:  Elly Johnson APRN CNP        Dose:  0.25 mg   Take 0.5 tablets (0.25 mg) by mouth 2 times daily as needed   Quantity:  15 tablet   Refills:  0                Primary Care Provider Office Phone # Fax #    Fran Haile [phone]       [facility] [address] [facility] [address]        Thank you!     Thank you for choosing [facility]  for your care. Our goal is always to provide you with excellent care. Hearing back from our patients is one way we can continue to improve our services. Please take a few minutes to complete the written survey that you may receive in the mail after your visit with us. Thank you!             Your Updated Medication List - Protect others around you: Learn how to safely use, store and throw away your medicines at www.disposemymeds.org.          This list is accurate as of: 3/15/17 12:36 PM.  Always use your most recent med list.                   Brand Name Dispense Instructions for use    aspirin 81 MG EC tablet     90 tablet    Take 1 tablet (81 mg) by mouth daily       blood glucose monitoring test strip    no brand specified    3 Box    One touch Ultra test strips testing once daily       LORazepam 0.5 MG tablet    ATIVAN    15 tablet    Take 0.5 tablets (0.25 mg) by mouth 2 times daily as needed       nitrofurantoin (macrocrystal-monohydrate) 100 MG capsule    MACROBID    14 capsule    Take 1 capsule (100 mg) by mouth 2 times daily for 7 days       ONE TOUCH DELICA LANCETS Misc     30 each    Blood sugar check once daily       * order for DME     1 Device    Equipment being ordered:  walker with wheels and seat       * order for DME     1 Device    Equipment being ordered: First alert notification system.       oxyCODONE-acetaminophen 5-325 MG per tablet    PERCOCET    28 tablet    Take 0.5-1 tablets by mouth every 4 hours as needed for pain       * Notice:  This list has 2 medication(s) that are the same as other medications prescribed for you. Read the directions carefully, and ask your doctor or other care provider to review them with you.

## 2017-03-15 NOTE — PATIENT INSTRUCTIONS
For intact skin, can use a simple cream like Eucerin, Vanicream or Aquaphor.    Make eye appointment.,    Make dermatology appointment for skin on nose.     Follow-up with neurology on EEG and whether or not additional workup or treatments for memory would be helpful    Let us know if you need additional services for home.

## 2017-03-15 NOTE — PROGRESS NOTES
SUBJECTIVE:                                                    Xochilt Trujillo is a 89 year old female who presents to clinic today for the following health issues:        Hospital Follow-up Visit:    Hospital/Nursing Home/IP Rehab Facility: [facility]  Date of Admission: 3/6/17  Date of Discharge: 3/9/17  Reason(s) for Admission: UTI            Problems taking medications regularly:  None       Medication changes since discharge: Yes - ABX was changed from Levaquin to Macrobid due to ankle swelling.       Problems adhering to non-medication therapy:  None    Summary of hospitalization:  [facility] discharge summary reviewed  Diagnostic Tests/Treatments reviewed.  Follow up needed: none  Other Healthcare Providers Involved in Patient s Care:         Homecare and Care Coordination  Update since discharge: improved. Still has some confusion and memory issues but much better. Does not stay home home alone. Ex son in law stays with her during the day.     Post Discharge Medication Reconciliation: discharge medications reconciled, continue medications without change.  Plan of care communicated with patient and family (two daughters)     Coding guidelines for this visit:  Type of Medical   Decision Making Face-to-Face Visit       within 7 Days of discharge Face-to-Face Visit        within 14 days of discharge   Moderate Complexity [id number]   High Complexity [id number]            PROBLEMS TO ADD ON...  1. Urinary tract infection without hematuria, site unspecified    2. AK (actinic keratosis)    3. Vascular dementia without behavioral disturbance    4. Type 2 diabetes mellitus without complication, without long-term current use of insulin (H)    5. Delirium secondary to multiple medical problems         Problem list and histories reviewed & adjusted, as indicated.  Additional history: as documented        Reviewed and updated as needed this visit by clinical staff  Tobacco  Allergies  Meds   "Med Hx  Surg Hx  Fam Hx  Soc Hx      Reviewed and updated as needed this visit by Provider         1. Urinary tract infection without hematuria, site unspecified    2. AK (actinic keratosis)    3. Vascular dementia without behavioral disturbance    4. Type 2 diabetes mellitus without complication, without long-term current use of insulin (H)    5. Delirium secondary to multiple medical problems        PMH: Updated and/or reviewed in chart.    PSH: Updated and/or reviewed in chart.    Family History: Updated and/or reviewed in chart.     ROS:  Constitutional, HEENT, cardiovascular, pulmonary, gi and gu systems are otherwise negative.    OBJECTIVE:                                                    /70 (BP Location: Left arm, Patient Position: Chair, Cuff Size: Adult Regular)  Pulse 80  Ht 5' 1\" (1.549 m)  Wt 99 lb 12.8 oz (45.3 kg)  BMI 18.86 kg/m2  GENERAL: Pleasant and interactive.  Alert and oriented x 3.  No acute distress.  GENERAL: Alert. Pleasant and cooperative.  Confused.  EXTREMITIES: Good pulses. Good range of motion.  No edema. Normal reflexes.  SKIN: keratosis on left side of nose recurs. Burns on legs are reepithelialized.       Results for orders placed or performed during the hospital encounter of 03/06/17   CT Head w/o Contrast    Narrative    CT HEAD W/O CONTRAST 3/6/2017 11:27 PM      HISTORY: Altered mental status.    TECHNIQUE: Routine noncontrast head CT. Radiation dose for this scan  was reduced using automated exposure control, adjustment of the mA  and/or kV according to patient size, or iterative reconstruction  technique.    COMPARISON: 2/2/2016.    FINDINGS: There is generalized brain atrophy. No mass, mass effect or  intracranial hemorrhage. No evidence of acute infarct.   Periventricular low attenuation is consistent with chronic small  vessel ischemic disease. The visualized paranasal sinuses are clear.      Impression    IMPRESSION:  No acute abnormality.    VANESSA LANDON MD "   Chest XR,  PA & LAT    Narrative    XR CHEST 2 VW  3/7/2017 12:22 AM      HISTORY: Altered mental status.     COMPARISON: 2/2/2016.    FINDINGS: The heart size is normal. The thoracic aorta is calcified  and mildly tortuous. The lungs are hyperinflated. There is a calcified  granuloma in the left apex laterally. The lungs otherwise appear  clear. No pneumothorax or pleural effusion. Degenerative disease in  the thoracic spine.      Impression    IMPRESSION: No acute abnormality.    VANESSA LANDON MD   UA reflex to Microscopic and Culture   Result Value Ref Range    Color Urine Yellow     Appearance Urine Slightly Cloudy     Glucose Urine Negative NEG mg/dL    Bilirubin Urine Negative NEG    Ketones Urine Negative NEG mg/dL    Specific Gravity Urine 1.016 1.003 - 1.035    Blood Urine Trace (A) NEG    pH Urine 5.5 5.0 - 7.0 pH    Protein Albumin Urine 10 (A) NEG mg/dL    Urobilinogen mg/dL Normal 0.0 - 2.0 mg/dL    Nitrite Urine Positive (A) NEG    Leukocyte Esterase Urine Large (A) NEG    Source Midstream Urine     RBC Urine 9 (H) 0 - 2 /HPF    WBC Urine 41 (H) 0 - 2 /HPF    Bacteria Urine Moderate (A) NEG /HPF    Squamous Epithelial /HPF Urine 3 (H) 0 - 1 /HPF    Mucous Urine Present (A) NEG /LPF   CBC with platelets differential   Result Value Ref Range    WBC 9.5 4.0 - 11.0 10e9/L    RBC Count 4.30 3.8 - 5.2 10e12/L    Hemoglobin 12.0 11.7 - 15.7 g/dL    Hematocrit 37.2 35.0 - 47.0 %    MCV 87 78 - 100 fl    MCH 27.9 26.5 - 33.0 pg    MCHC 32.3 31.5 - 36.5 g/dL    RDW 14.5 10.0 - 15.0 %    Platelet Count 275 150 - 450 10e9/L    Diff Method Automated Method     % Neutrophils 69.5 %    % Lymphocytes 18.9 %    % Monocytes 9.2 %    % Eosinophils 1.5 %    % Basophils 0.6 %    % Immature Granulocytes 0.3 %    Absolute Neutrophil 6.6 1.6 - 8.3 10e9/L    Absolute Lymphocytes 1.8 0.8 - 5.3 10e9/L    Absolute Monocytes 0.9 0.0 - 1.3 10e9/L    Absolute Eosinophils 0.1 0.0 - 0.7 10e9/L    Absolute Basophils 0.1 0.0 - 0.2  10e9/L    Abs Immature Granulocytes 0.0 0 - 0.4 10e9/L   INR   Result Value Ref Range    INR 0.93 0.86 - 1.14   Troponin I   Result Value Ref Range    Troponin I ES  0.000 - 0.045 ug/L     <0.015  The 99th percentile for upper reference range is 0.045 ug/L.  Troponin values in   the range of 0.045 - 0.120 ug/L may be associated with risks of adverse   clinical events.     Comprehensive metabolic panel   Result Value Ref Range    Sodium 141 133 - 144 mmol/L    Potassium 4.3 3.4 - 5.3 mmol/L    Chloride 107 94 - 109 mmol/L    Carbon Dioxide 27 20 - 32 mmol/L    Anion Gap 7 3 - 14 mmol/L    Glucose 112 (H) 70 - 99 mg/dL    Urea Nitrogen 31 (H) 7 - 30 mg/dL    Creatinine 1.42 (H) 0.52 - 1.04 mg/dL    GFR Estimate 35 (L) >60 mL/min/1.7m2    GFR Estimate If Black 42 (L) >60 mL/min/1.7m2    Calcium 8.8 8.5 - 10.1 mg/dL    Bilirubin Total 0.3 0.2 - 1.3 mg/dL    Albumin 3.2 (L) 3.4 - 5.0 g/dL    Protein Total 7.0 6.8 - 8.8 g/dL    Alkaline Phosphatase 75 40 - 150 U/L    ALT 25 0 - 50 U/L    AST 17 0 - 45 U/L   TSH with free T4 reflex   Result Value Ref Range    TSH 2.54 0.40 - 4.00 mU/L   Ammonia (on ice)   Result Value Ref Range    Ammonia <10 (L) 10 - 50 umol/L   Platelet count   Result Value Ref Range    Platelet Count 277 150 - 450 10e9/L   Lactic acid whole blood   Result Value Ref Range    Lactic Acid 0.8 0.7 - 2.1 mmol/L   TSH   Result Value Ref Range    TSH 3.13 0.40 - 4.00 mU/L   T4 free   Result Value Ref Range    T4 Free 1.02 0.76 - 1.46 ng/dL   Vitamin B12   Result Value Ref Range    Vitamin B12 415 193 - 986 pg/mL   Basic metabolic panel   Result Value Ref Range    Sodium 146 (H) 133 - 144 mmol/L    Potassium 4.4 3.4 - 5.3 mmol/L    Chloride 114 (H) 94 - 109 mmol/L    Carbon Dioxide 25 20 - 32 mmol/L    Anion Gap 7 3 - 14 mmol/L    Glucose 153 (H) 70 - 99 mg/dL    Urea Nitrogen 17 7 - 30 mg/dL    Creatinine 1.18 (H) 0.52 - 1.04 mg/dL    GFR Estimate 43 (L) >60 mL/min/1.7m2    GFR Estimate If Black 52 (L) >60  mL/min/1.7m2    Calcium 8.8 8.5 - 10.1 mg/dL   CBC with platelets differential   Result Value Ref Range    WBC 7.1 4.0 - 11.0 10e9/L    RBC Count 4.35 3.8 - 5.2 10e12/L    Hemoglobin 12.0 11.7 - 15.7 g/dL    Hematocrit 37.5 35.0 - 47.0 %    MCV 86 78 - 100 fl    MCH 27.6 26.5 - 33.0 pg    MCHC 32.0 31.5 - 36.5 g/dL    RDW 14.4 10.0 - 15.0 %    Platelet Count 265 150 - 450 10e9/L    Diff Method Automated Method     % Neutrophils 62.4 %    % Lymphocytes 24.4 %    % Monocytes 9.4 %    % Eosinophils 2.4 %    % Basophils 1.0 %    % Immature Granulocytes 0.4 %    Absolute Neutrophil 4.4 1.6 - 8.3 10e9/L    Absolute Lymphocytes 1.7 0.8 - 5.3 10e9/L    Absolute Monocytes 0.7 0.0 - 1.3 10e9/L    Absolute Eosinophils 0.2 0.0 - 0.7 10e9/L    Absolute Basophils 0.1 0.0 - 0.2 10e9/L    Abs Immature Granulocytes 0.0 0 - 0.4 10e9/L   Care Transition RN/SW IP Consult    Narrative    Angélica Rodríguez LICSW     3/7/2017  1:26 PM      Reason for Referral: DC planning    Presenting Problem: Delerium    Cognitive Behavioral Status: awake, alert and oriented    Support system: family    Current Living Situation:   Home Setting: [facility]   Living Situation: with adult dtr   Function Status / Assistive Device: no assistive devices    Employment/ Financial/ Insurance Concerns: retired          No Insurance issues identified      Housing Concerns: No    Health Care Directives:  Not on file    Assessment: This writer met with pt, pts dtr and also spoke with   pts other dtr via telephone introduced self and role. Discussed   dc planning. Pt lives with adult dtr, Kristina. Pt recently   discharged from [facility] ([phone] Fax:   [phone]). Another referral will be placed for [facility] on dc.   Pt will dc home when stable.       Plan: home    Angélica BURNETT, FERNANDO, [phone]                     Social Work IP Consult    Narrative    Angélica Rodríguez, [facility]     3/7/2017  1:26 PM      Reason for Referral: DC  planning    Presenting Problem: Delerium    Cognitive Behavioral Status: awake, alert and oriented    Support system: family    Current Living Situation:   Home Setting: [place]   Living Situation: with adult dtr   Function Status / Assistive Device: no assistive devices    Employment/ Financial/ Insurance Concerns: retired          No Insurance issues identified      Housing Concerns: No    Health Care Directives:  Not on file    Assessment: This writer met with pt, pts dtr and also spoke with   pts other dtr via telephone introduced self and role. Discussed   dc planning. Pt lives with adult dtrKristina. Pt recently   discharged from [facility] ([phone] Fax:   [phone]). Another referral will be placed for [facility] on dc.   Pt will dc home when stable.       Plan: home    Angélica Anne BURNETT, [facility], [phone]                     Urine Culture Aerobic Bacterial   Result Value Ref Range    Specimen Description Midstream Urine     Culture Micro >100,000 colonies/mL Escherichia coli (A)     Micro Report Status FINAL 03/09/2017     Organism: >100,000 colonies/mL Escherichia coli        Susceptibility    >100,000 colonies/ml escherichia coli (evita) -  (no method available)     AMPICILLIN >=32 Resistant  ug/mL     CEFAZOLIN Value in next row  ug/mL      <=4 SusceptibleCefazolin EVITA breakpoints are for the treatment of uncomplicated urinary tract infections.  For the treatment of systemic infections, please contact the laboratory for additional testing.     CEFOXITIN Value in next row  ug/mL      <=4 SusceptibleCefazolin EVITA breakpoints are for the treatment of uncomplicated urinary tract infections.  For the treatment of systemic infections, please contact the laboratory for additional testing.     CEFTAZIDIME Value in next row  ug/mL      <=4 SusceptibleCefazolin EVITA breakpoints are for the treatment of uncomplicated urinary tract infections.  For the treatment of systemic infections,  please contact the laboratory for additional testing.     CEFTRIAXONE Value in next row  ug/mL      <=4 SusceptibleCefazolin CHAIM breakpoints are for the treatment of uncomplicated urinary tract infections.  For the treatment of systemic infections, please contact the laboratory for additional testing.     CIPROFLOXACIN Value in next row  ug/mL      <=4 SusceptibleCefazolin CHAIM breakpoints are for the treatment of uncomplicated urinary tract infections.  For the treatment of systemic infections, please contact the laboratory for additional testing.     GENTAMICIN Value in next row  ug/mL      <=4 SusceptibleCefazolin CHAIM breakpoints are for the treatment of uncomplicated urinary tract infections.  For the treatment of systemic infections, please contact the laboratory for additional testing.     LEVOFLOXACIN Value in next row  ug/mL      <=4 SusceptibleCefazolin CHAIM breakpoints are for the treatment of uncomplicated urinary tract infections.  For the treatment of systemic infections, please contact the laboratory for additional testing.     NITROFURANTOIN Value in next row  ug/mL      <=4 SusceptibleCefazolin CHAIM breakpoints are for the treatment of uncomplicated urinary tract infections.  For the treatment of systemic infections, please contact the laboratory for additional testing.     TOBRAMYCIN Value in next row  ug/mL      <=4 SusceptibleCefazolin CHAIM breakpoints are for the treatment of uncomplicated urinary tract infections.  For the treatment of systemic infections, please contact the laboratory for additional testing.     Trimethoprim/Sulfa Value in next row  ug/mL      <=4 SusceptibleCefazolin CHAIM breakpoints are for the treatment of uncomplicated urinary tract infections.  For the treatment of systemic infections, please contact the laboratory for additional testing.     AMPICILLIN/SULBACTAM Value in next row  ug/mL      <=4 SusceptibleCefazolin CHAIM breakpoints are for the treatment of uncomplicated  urinary tract infections.  For the treatment of systemic infections, please contact the laboratory for additional testing.     Piperacillin/Tazo Value in next row  ug/mL      <=4 SusceptibleCefazolin CHAIM breakpoints are for the treatment of uncomplicated urinary tract infections.  For the treatment of systemic infections, please contact the laboratory for additional testing.     CEFEPIME Value in next row  ug/mL      <=4 SusceptibleCefazolin CHAIM breakpoints are for the treatment of uncomplicated urinary tract infections.  For the treatment of systemic infections, please contact the laboratory for additional testing.      ASSESSMENT/PLAN:                                                        ICD-10-CM    1. Urinary tract infection without hematuria, site unspecified N39.0    2. AK (actinic keratosis) L57.0    3. Vascular dementia without behavioral disturbance F01.50    4. Type 2 diabetes mellitus without complication, without long-term current use of insulin (H) E11.9    5. Delirium secondary to multiple medical problems F05        Care plan updated in chart for chronic problems.    Patient Instructions     For intact skin, can use a simple cream like Eucerin, Vanicream or Aquaphor.    Make eye appointment.,    Make dermatology appointment for skin on nose.     Follow-up with neurology on EEG and whether or not additional workup or treatments for memory would be helpful    Let us know if you need additional services for home.      No orders of the defined types were placed in this encounter.     Goals     None           Fran Haile MD

## 2017-03-16 ENCOUNTER — TELEPHONE (OUTPATIENT)
Dept: FAMILY MEDICINE | Facility: CLINIC | Age: 82
End: 2017-03-16

## 2017-03-16 DIAGNOSIS — N30.00 ACUTE CYSTITIS WITHOUT HEMATURIA: Primary | ICD-10-CM

## 2017-03-16 DIAGNOSIS — N76.0 ACUTE VAGINITIS: ICD-10-CM

## 2017-03-16 RX ORDER — FLUCONAZOLE 150 MG/1
150 TABLET ORAL ONCE
Qty: 1 TABLET | Refills: 0 | Status: SHIPPED | OUTPATIENT
Start: 2017-03-16 | End: 2017-03-16

## 2017-03-16 NOTE — TELEPHONE ENCOUNTER
Pt  Daughter is calling and states that her mom was seen yesterday and was not tested for a UTI, her mom is complaining today of burning down in her vagina, and burns when she urinates, she has a UTI a week ago and she thinks it either never went away or It returned, she states that her mom cant stand on her legs on her own  And having body spasms.     Erica Carrasco, [place]

## 2017-03-16 NOTE — TELEPHONE ENCOUNTER
"I spoke with Taz. She said, \"I dont think Mom's bladder infection is gone because she is itching and burning so bad in her private area she can't sit still. She says when she urinates it really hurts.\"    Patient is currently taking Macrobid for UTI. The organism she has is sensitive to this. Was previously on Levaquin. She is afebrile. The home health aid is going to come and give her a shower today. Daughter says she is difficult to manage because she has spasms etc so she is unsure of what the area looks like or if she has a discharge. Also her mother is confused so cant be reliable in what she says but she says she seems like she is in pain.    Explained to daughter that I suspect it is a yeast infection as she is currently on antibiotics, If she is scratching and irritating the vaginal tissue it will hurt to urinate. Would recommend Monistat externally for itching. Once antibiotic is doen would repeat UA early next week unless she develops a fever or new or worsening symptoms. Then would have her evaluated at the hospital.     Will route note to Dr Haile to see if she can also get diflucan given the difficulty to move patient. They use [facility]. If different direction please let me know. Linda Enrique RN    "

## 2017-03-16 NOTE — TELEPHONE ENCOUNTER
Spoke with daughter. She said she would like to collect the urine at home and bring it in next week. Kits and instructions left for her in the pharmacy. Orders placed. Linda Enrique RN

## 2017-03-21 DIAGNOSIS — N39.0 URINARY TRACT INFECTION: ICD-10-CM

## 2017-03-21 LAB
ALBUMIN UR-MCNC: ABNORMAL MG/DL
APPEARANCE UR: CLEAR
BILIRUB UR QL STRIP: NEGATIVE
COLOR UR AUTO: YELLOW
GLUCOSE UR STRIP-MCNC: NEGATIVE MG/DL
HGB UR QL STRIP: ABNORMAL
KETONES UR STRIP-MCNC: NEGATIVE MG/DL
LEUKOCYTE ESTERASE UR QL STRIP: ABNORMAL
NITRATE UR QL: NEGATIVE
NON-SQ EPI CELLS #/AREA URNS LPF: ABNORMAL /LPF
PH UR STRIP: 6.5 PH (ref 5–7)
RBC #/AREA URNS AUTO: ABNORMAL /HPF (ref 0–2)
SP GR UR STRIP: 1.02 (ref 1–1.03)
URN SPEC COLLECT METH UR: ABNORMAL
UROBILINOGEN UR STRIP-ACNC: 0.2 EU/DL (ref 0.2–1)
WBC #/AREA URNS AUTO: ABNORMAL /HPF (ref 0–2)

## 2017-03-21 PROCEDURE — 87088 URINE BACTERIA CULTURE: CPT | Performed by: FAMILY MEDICINE

## 2017-03-21 PROCEDURE — 87186 SC STD MICRODIL/AGAR DIL: CPT | Mod: 59 | Performed by: FAMILY MEDICINE

## 2017-03-21 PROCEDURE — 87086 URINE CULTURE/COLONY COUNT: CPT | Performed by: FAMILY MEDICINE

## 2017-03-21 PROCEDURE — 81001 URINALYSIS AUTO W/SCOPE: CPT | Performed by: FAMILY MEDICINE

## 2017-03-21 PROCEDURE — 87185 SC STD ENZYME DETCJ PER NZM: CPT | Performed by: FAMILY MEDICINE

## 2017-03-21 NOTE — TELEPHONE ENCOUNTER
Please review results and advise. Thank you.  Elida Foreman RN    Component      Latest Ref Rng & Units 3/21/2017   Color Urine       Yellow   Appearance Urine       Clear   Glucose Urine      NEG mg/dL Negative   Bilirubin Urine      NEG Negative   Ketones Urine      NEG mg/dL Negative   Specific Gravity Urine      1.003 - 1.035 1.020   Blood Urine      NEG Small (A)   pH Urine      5.0 - 7.0 pH 6.5   Protein Albumin Urine      NEG mg/dL Trace (A)   Urobilinogen Urine      0.2 - 1.0 EU/dL 0.2   Nitrite Urine      NEG Negative   Leukocyte Esterase Urine      NEG Moderate (A)   Source       Midstream Urine   WBC Urine      0 - 2 /HPF 5-10 (A)   RBC Urine      0 - 2 /HPF O - 2   Squamous Epithelial /LPF Urine      FEW /LPF Few

## 2017-04-05 ENCOUNTER — CARE COORDINATION (OUTPATIENT)
Dept: CASE MANAGEMENT | Facility: CLINIC | Age: 82
End: 2017-04-05

## 2017-04-05 NOTE — PROGRESS NOTES
This writer spoke with the  for [facility] PT and RN services.  She noted that the patient's daughter sets up and administers all medications.    also noted that she met with patient, and she believes she would benefit from a follow-up call from a .     RUTHIE Neal will reach out to patient    Carisa Conley

## 2017-04-06 NOTE — PROGRESS NOTES
Eprognosis suggest 25% one year mortality.  Will update at office visit and adjust if clinical changes.

## 2017-04-06 NOTE — PROGRESS NOTES
Clinic Care Coordination Contact  OUTREACH    Referral Information:  Referral Source: IP/TCU Report  Reason for Contact: Follow-up on home care discharge  Care Conference: No     Universal Utilization:   ED Visits in last year: 3  Hospital visits in last year: 1  Last PCP appointment: 11/18/16  Missed Appointments: 0  Concerns: none  Multiple Providers or Specialists: YES    Clinical Concerns:  Current Medical Concerns:   Patient Active Problem List   Diagnosis     Iron deficiency anemia     Abdominal aneurysm (H)     Cerebral infarction (H)     Ovarian cancer (H)     Erosive gastritis     MI (myocardial infarction) (H)     Sigmoid diverticulitis     CKD (chronic kidney disease) stage 4, GFR 15-29 ml/min (H)     Diabetes mellitus type 2, uncomplicated (H)     Hypertension goal BP (blood pressure) < 140/90     Advanced directives, counseling/discussion     Health Care Home     Pseudophakia with Yag Caps, OU     PVD (posterior vitreous detachment) OU     Age-related osteoporosis without current pathological fracture     Dermatochalasis, unspecified laterality     Delirium     Gastrointestinal hemorrhage     Hyperlipidemia     Urinary tract infection     Muscle spasm       Current Behavioral Concerns: NA    Education Provided to patient: Hospice   Clinical Pathway: None    Medication Management:  Daughter assists     Functional Status:  Mobility Status: Independent w/Device  Equipment Currently Used at Home: walker, rolling, walker, standard  Transportation: Daughter provides     Psychosocial:  Current living arrangement:: I live in a private home with family  Financial/Insurance: [facility], not discussed     Resources and Interventions:  Advanced Care Plans/Directives on file:: Yes, DNR/DNI     Patient/Caregiver understanding: Spoke with patient's daughter Taz, who reports that patient is doing okay, walking with a walker independently and toileting self, however her activities are very limited. Pt  continues to have spasms in the morning. Pt's daughter stated that the home care nurse had discussed Hospice services before discharge. Discussed hospice care and coverage available companies that provide care, what services are available, and qualifications. Message sent to provider about hospice request.  Frequency of Care Coordination: AS NEEDED  Upcoming appointment: 04/07/17     Plan: RUTHIE THOMAS will follow-up tomorrow after PCP appointment.     ANGÉLICA Helton  [facility] Care Coordinator  [phone]

## 2017-04-07 ENCOUNTER — TELEPHONE (OUTPATIENT)
Dept: FAMILY MEDICINE | Facility: CLINIC | Age: 82
End: 2017-04-07

## 2017-04-07 ENCOUNTER — OFFICE VISIT (OUTPATIENT)
Dept: FAMILY MEDICINE | Facility: CLINIC | Age: 82
End: 2017-04-07
Payer: COMMERCIAL

## 2017-04-07 VITALS
DIASTOLIC BLOOD PRESSURE: 70 MMHG | HEART RATE: 100 BPM | WEIGHT: 101.3 LBS | TEMPERATURE: 97.7 F | BODY MASS INDEX: 19.13 KG/M2 | HEIGHT: 61 IN | SYSTOLIC BLOOD PRESSURE: 90 MMHG

## 2017-04-07 DIAGNOSIS — D50.9 IRON DEFICIENCY ANEMIA, UNSPECIFIED IRON DEFICIENCY ANEMIA TYPE: Chronic | ICD-10-CM

## 2017-04-07 DIAGNOSIS — M62.838 MUSCLE SPASM: ICD-10-CM

## 2017-04-07 DIAGNOSIS — G25.3 MYOCLONIC JERKING: ICD-10-CM

## 2017-04-07 DIAGNOSIS — F03.90 SENILE DEMENTIA WITHOUT BEHAVIORAL DISTURBANCE (H): ICD-10-CM

## 2017-04-07 DIAGNOSIS — N39.0 RECURRENT URINARY TRACT INFECTION: ICD-10-CM

## 2017-04-07 DIAGNOSIS — R82.90 NONSPECIFIC FINDING ON EXAMINATION OF URINE: ICD-10-CM

## 2017-04-07 DIAGNOSIS — I10 HYPERTENSION GOAL BP (BLOOD PRESSURE) < 140/90: Chronic | ICD-10-CM

## 2017-04-07 DIAGNOSIS — N18.4 CKD (CHRONIC KIDNEY DISEASE) STAGE 4, GFR 15-29 ML/MIN (H): Primary | Chronic | ICD-10-CM

## 2017-04-07 DIAGNOSIS — E11.9 TYPE 2 DIABETES MELLITUS WITHOUT COMPLICATION, WITHOUT LONG-TERM CURRENT USE OF INSULIN (H): Chronic | ICD-10-CM

## 2017-04-07 DIAGNOSIS — N30.00 ACUTE CYSTITIS WITHOUT HEMATURIA: ICD-10-CM

## 2017-04-07 LAB
ALBUMIN SERPL-MCNC: 3.5 G/DL (ref 3.4–5)
ALBUMIN UR-MCNC: ABNORMAL MG/DL
ALP SERPL-CCNC: 81 U/L (ref 40–150)
ALT SERPL W P-5'-P-CCNC: 26 U/L (ref 0–50)
ANION GAP SERPL CALCULATED.3IONS-SCNC: 7 MMOL/L (ref 3–14)
APPEARANCE UR: CLEAR
AST SERPL W P-5'-P-CCNC: 21 U/L (ref 0–45)
BACTERIA #/AREA URNS HPF: ABNORMAL /HPF
BILIRUB SERPL-MCNC: 0.3 MG/DL (ref 0.2–1.3)
BILIRUB UR QL STRIP: ABNORMAL
BUN SERPL-MCNC: 35 MG/DL (ref 7–30)
CALCIUM SERPL-MCNC: 8.6 MG/DL (ref 8.5–10.1)
CHLORIDE SERPL-SCNC: 106 MMOL/L (ref 94–109)
CO2 SERPL-SCNC: 27 MMOL/L (ref 20–32)
COLOR UR AUTO: YELLOW
CREAT SERPL-MCNC: 1.39 MG/DL (ref 0.52–1.04)
FERRITIN SERPL-MCNC: 101 NG/ML (ref 8–252)
GFR SERPL CREATININE-BSD FRML MDRD: 35 ML/MIN/1.7M2
GLUCOSE SERPL-MCNC: 119 MG/DL (ref 70–99)
GLUCOSE UR STRIP-MCNC: NEGATIVE MG/DL
HBA1C MFR BLD: 6.4 % (ref 4.3–6)
HGB BLD-MCNC: 12.4 G/DL (ref 11.7–15.7)
HGB UR QL STRIP: NEGATIVE
KETONES UR STRIP-MCNC: ABNORMAL MG/DL
LEUKOCYTE ESTERASE UR QL STRIP: ABNORMAL
NITRATE UR QL: NEGATIVE
NON-SQ EPI CELLS #/AREA URNS LPF: ABNORMAL /LPF
PH UR STRIP: 5.5 PH (ref 5–7)
POTASSIUM SERPL-SCNC: 4.3 MMOL/L (ref 3.4–5.3)
PROT SERPL-MCNC: 7.3 G/DL (ref 6.8–8.8)
RBC #/AREA URNS AUTO: ABNORMAL /HPF (ref 0–2)
SODIUM SERPL-SCNC: 140 MMOL/L (ref 133–144)
SP GR UR STRIP: 1.02 (ref 1–1.03)
URN SPEC COLLECT METH UR: ABNORMAL
UROBILINOGEN UR STRIP-ACNC: 0.2 EU/DL (ref 0.2–1)
WBC #/AREA URNS AUTO: ABNORMAL /HPF (ref 0–2)

## 2017-04-07 PROCEDURE — 87086 URINE CULTURE/COLONY COUNT: CPT | Performed by: FAMILY MEDICINE

## 2017-04-07 PROCEDURE — 87088 URINE BACTERIA CULTURE: CPT | Performed by: FAMILY MEDICINE

## 2017-04-07 PROCEDURE — 80053 COMPREHEN METABOLIC PANEL: CPT | Performed by: FAMILY MEDICINE

## 2017-04-07 PROCEDURE — 87186 SC STD MICRODIL/AGAR DIL: CPT | Mod: 59 | Performed by: FAMILY MEDICINE

## 2017-04-07 PROCEDURE — 85018 HEMOGLOBIN: CPT | Performed by: FAMILY MEDICINE

## 2017-04-07 PROCEDURE — 87185 SC STD ENZYME DETCJ PER NZM: CPT | Performed by: FAMILY MEDICINE

## 2017-04-07 PROCEDURE — 83036 HEMOGLOBIN GLYCOSYLATED A1C: CPT | Performed by: FAMILY MEDICINE

## 2017-04-07 PROCEDURE — 99214 OFFICE O/P EST MOD 30 MIN: CPT | Performed by: FAMILY MEDICINE

## 2017-04-07 PROCEDURE — 82728 ASSAY OF FERRITIN: CPT | Performed by: FAMILY MEDICINE

## 2017-04-07 PROCEDURE — 36415 COLL VENOUS BLD VENIPUNCTURE: CPT | Performed by: FAMILY MEDICINE

## 2017-04-07 PROCEDURE — 81001 URINALYSIS AUTO W/SCOPE: CPT | Performed by: FAMILY MEDICINE

## 2017-04-07 RX ORDER — LORAZEPAM 0.5 MG/1
TABLET ORAL
Qty: 45 TABLET | Refills: 0 | Status: ON HOLD | OUTPATIENT
Start: 2017-04-07 | End: 2017-05-26

## 2017-04-07 NOTE — PROGRESS NOTES
Clinic Care Coordination Contact    Follow-up with patient's daughter no other concerns at this time. Will follow-up in 1-2 weeks.    ANGÉLICA Helton  [facility] Care Coordinator  [phone]

## 2017-04-07 NOTE — MR AVS SNAPSHOT
"              After Visit Summary   4/7/2017    Xochilt Trujillo    MRN: [id number]           Patient Information     Date Of Birth          5/28/1924        Visit Information        Provider Department      4/7/2017 1:00 PM Fran Haile MD [facility]        Today's Diagnoses     CKD (chronic kidney disease) stage 4, GFR 15-29 ml/min (H)    -  1    Type 2 diabetes mellitus without complication, without long-term current use of insulin (H)        Hypertension goal BP (blood pressure) < 140/90        Recurrent urinary tract infection        Muscle spasm        Myoclonic jerking        Iron deficiency anemia, unspecified iron deficiency anemia type        Senile dementia without behavioral disturbance          Care Instructions    Labs today.    Follow-up with the neurologist as planned regarding the spasms.     Homecare and hospice referral done.      Ok to use the lorazepam as prescribed and monitor for over sedation or confusion.     Follow-up with me in 3 months.            Follow-ups after your visit        Additional Services     HOME CARE NURSING REFERRAL       Hospice and homecare.  Homebound.  Combination of diagnosis indicate a relatively high likelihood of death within the next year.      CKD (chronic kidney disease) stage 4, GFR 15-29 ml/min (H)  (primary encounter diagnosis)  Type 2 diabetes mellitus without complication, without long-term current use of insulin (H)  Hypertension goal BP (blood pressure) < 140/90  Recurrent urinary tract infection  Muscle spasm  Myoclonic jerking  Iron deficiency anemia, unspecified iron deficiency anemia type Order classes \"[place] Homecare\", \" Homecare\" and \"[place] Homecare\" will route to the Home Care and Hospice Referral Pool.  ===Home Care or Hospice will contact patient to schedule===    Order class \"Local Print\" will print a paper order to give to the patient.  ===Patient is responsible for " scheduling===  _______________________________________________________________________    Your provider has referred you to:  [facility]: [facility] [phone]   [email or web address]    Extended Emergency Contact Information  Primary Emergency Contact: FABIANA CARPENTER   [facility]  Home Phone: [phone]  Mobile Phone: [phone]  Relation: Daughter  Secondary Emergency Contact: Dawna   [place]  Mobile Phone: [phone]  Relation: None      PLEASE EVALUATE AND TREAT. (Evaluation timeline is 24-48 hrs. Please call if there is need for a variance to this timeline.)    REASON FOR REFERRAL: Assessment & Treatment: PT, RN and ST    ADDITIONAL SERVICES NEEDED: TBD    OTHER PERTINENT INFORMATION:  Patient was last seen by provider on April 7, 2017  for general update    Current Outpatient Prescriptions:  LORazepam (ATIVAN) 0.5 MG tablet, Take 0.5 tablets (0.25 mg) by mouth 2 times daily as needed, Disp: 15 tablet, Rfl: 0  oxyCODONE-acetaminophen (PERCOCET) 5-325 MG per tablet, Take 0.5-1 tablets by mouth every 4 hours as needed for pain, Disp: 28 tablet, Rfl: 0  aspirin 81 MG EC tablet, Take 1 tablet (81 mg) by mouth daily, Disp: 90 tablet, Rfl: 3  order for DME, Equipment being ordered: First alert notification system., Disp: 1 Device, Rfl: 0  blood glucose test strip, One touch Ultra test strips testing once daily, Disp: 3 Box, Rfl: 1  ORDER FOR DME, Equipment being ordered: walker with wheels and seat, Disp: 1 Device, Rfl: 0  ONE TOUCH DELICA LANCETS MISC, Blood sugar check once daily, Disp: 30 each, Rfl: 5      Patient Active Problem List:     Iron deficiency anemia     Abdominal aneurysm (H)     Cerebral infarction (H)     Ovarian cancer (H)     Erosive gastritis     MI (myocardial infarction) (H)     Sigmoid diverticulitis     CKD (chronic kidney disease) stage 4, GFR 15-29 ml/min (H)     Diabetes mellitus type 2, uncomplicated (H)      Hypertension goal BP (blood pressure) < 140/90     Advanced directives, counseling/discussion     Health Care Home     Pseudophakia with Yag Caps, OU     PVD (posterior vitreous detachment) OU     Age-related osteoporosis without current pathological fracture     Dermatochalasis, unspecified laterality     Delirium     Gastrointestinal hemorrhage     Hyperlipidemia     Recurrent urinary tract infection     Muscle spasm      I certify that this patient is under my care and that I, or a nurse practitioner or physician's assistant working with me, had a face-to-face encounter that meets the physician face-to-face encounter requirements with this patient on: 4/7/2017.    The encounter with the patient was in whole, or in part, for the following medical condition, which is the primary reason for home health care:  CKD (chronic kidney disease) stage 4, GFR 15-29 ml/min (H)  (primary encounter diagnosis)  Type 2 diabetes mellitus without complication, without long-term current use of insulin (H)  Hypertension goal BP (blood pressure) < 140/90  Recurrent urinary tract infection   Generalized weakness.       I certify that, based on my findings, the following services are medically necessary home health services:Nursing and Physical Therapy    My clinical findings support the need for the above services because of the need for med evaluation and teaching.    Further, I certify that my clinical findings support this patient is homebound because of medical restrictions, patient cannot leave home unassisted and of poor endurance to activity.    Authorizing Provider Signature:  Jaylan Haile MD        Please be aware that coverage of these services is subject to the terms and limitations of your health insurance plan.  Call member services at your health plan with any benefit or coverage questions.                  Follow-up notes from your care team     Return in about 3 months (around 7/7/2017).      Your next 10 appointments  "already scheduled     Apr 18, 2017  9:30 AM CDT   Return Visit with Manju Joaquin MD   [facility] ([facility]    [address] [place] [address] [phone]              Who to contact     Normal or non-critical lab and imaging results will be communicated to you by HouzeMehart, letter or phone within 4 business days after the clinic has received the results. If you do not hear from us within 7 days, please contact the clinic through HouzeMehart or phone. If you have a critical or abnormal lab result, we will notify you by phone as soon as possible.  Submit refill requests through eMoov or call your pharmacy and they will forward the refill request to us. Please allow 3 business days for your refill to be completed.          If you need to speak with a  for additional information , please call: [phone]           Additional Information About Your Visit        eMoov Information     eMoov gives you secure access to your electronic health record. If you see a primary care provider, you can also send messages to your care team and make appointments. If you have questions, please call your primary care clinic.  If you do not have a primary care provider, please call [phone] and they will assist you.        Care EveryWhere ID     This is your Care EveryWhere ID. This could be used by other organizations to access your [place] medical records  [id number]        Your Vitals Were     Pulse Temperature Height Breastfeeding? BMI (Body Mass Index)       100 97.7  F (36.5  C) (Tympanic) 5' 1\" (1.549 m) No 19.14 kg/m2        Blood Pressure from Last 3 Encounters:   04/07/17 90/70   03/15/17 132/70   03/09/17 127/69    Weight from Last 3 Encounters:   04/07/17 101 lb 4.8 oz (45.9 kg)   03/15/17 99 lb 12.8 oz (45.3 kg)   03/09/17 102 lb 1.2 oz (46.3 kg)              We Performed the Following     *UA reflex to Microscopic and Culture ([facility] [place] [facility] (except [place] and [place])     Comprehensive metabolic panel (BMP + Alb, Alk Phos, ALT, AST, Total. Bili, TP)     Ferritin     Hemoglobin A1c     Hemoglobin     HOME CARE NURSING REFERRAL          Today's Medication Changes          These changes are accurate as of: 4/7/17  1:51 PM.  If you have any questions, ask your nurse or doctor.               These medicines have changed or have updated prescriptions.        Dose/Directions    LORazepam 0.5 MG tablet   Commonly known as:  ATIVAN   This may have changed:    - how much to take  - how to take this  - when to take this  - reasons to take this  - additional instructions   Used for:  Muscle spasm   Changed by:  Fran Haile MD        One half at night and one when spasms occur.   Quantity:  45 tablet   Refills:  0            Where to get your medicines      Some of these will need a paper prescription and others can be bought over the counter.  Ask your nurse if you have questions.     Bring a paper prescription for each of these medications     LORazepam 0.5 MG tablet                Primary Care Provider Office Phone # Fax #    Fran Haile [phone]       [facility] [address] [facility] [address]        Thank you!     Thank you for choosing [facility]  for your care. Our goal is always to provide you with excellent care. Hearing back from our patients is one way we can continue to improve our services. Please take a few minutes to complete the written survey that you may receive in the mail after your visit with us. Thank you!             Your Updated Medication List - Protect others around you: Learn how to safely use, store and throw away your medicines at www.disposemymeds.org.          This list is accurate as of: 4/7/17  1:51 PM.  Always use your most recent med list.                   Brand Name Dispense Instructions for use    aspirin 81 MG EC tablet     90 tablet    Take  1 tablet (81 mg) by mouth daily       blood glucose monitoring test strip    no brand specified    3 Box    One touch Ultra test strips testing once daily       LORazepam 0.5 MG tablet    ATIVAN    45 tablet    One half at night and one when spasms occur.       ONE TOUCH DELICA LANCETS Misc     30 each    Blood sugar check once daily       * order for DME     1 Device    Equipment being ordered: walker with wheels and seat       * order for DME     1 Device    Equipment being ordered: First alert notification system.       oxyCODONE-acetaminophen 5-325 MG per tablet    PERCOCET    28 tablet    Take 0.5-1 tablets by mouth every 4 hours as needed for pain       * Notice:  This list has 2 medication(s) that are the same as other medications prescribed for you. Read the directions carefully, and ask your doctor or other care provider to review them with you.

## 2017-04-07 NOTE — TELEPHONE ENCOUNTER
[facility] called regarding hospice referral.  In order for patient to be assessed for hospice, the referral would need to clearly give a diagnosis to support that patient is not expected to live beyond 6 months. Patient was just discharged from home care due to no skilled needs at this time. The assessment would not take place until next week.  Home care phone number is [phone].  Will route to provider for recommendations on hospice referral.    Flavia Martin RN

## 2017-04-07 NOTE — TELEPHONE ENCOUNTER
I told the patient and daughter that she had an approximately 29% likelihood of death within the next year based on calculations from the [facility] calculator.  I did not feel that this would meet the criteria either.  She states that she was told by [facility] RN coordinator that is should be covered.  Please let the patient an daughter know that insurance is not covering this at this time.

## 2017-04-07 NOTE — PROGRESS NOTES
SUBJECTIVE:                                                    Xochilt Trujillo is a 89 year old female who presents to clinic today for the following health issues:        Diabetes Follow-up      Patient is checking blood sugars: rarely.  Results range from 100 to 120    Diabetic concerns: None and other -      Symptoms of hypoglycemia (low blood sugar): none     Paresthesias (numbness or burning in feet) or sores: No     Date of last diabetic eye exam: 1 year  Lab Results   Component Value Date    A1C 6.1 10/11/2016        Hyperlipidemia Follow-Up      Rate your low fat/cholesterol diet?: not monitoring fat    Taking statin?  No    Other lipid medications/supplements?:  None  Lab Results   Component Value Date     10/11/2016    LDL 78 07/29/2015        Hypertension Follow-up      Outpatient blood pressures are being checked at home.      Low Salt Diet: not monitoring salt  Lab Results   Component Value Date    POTASSIUM 4.4 03/08/2017            Amount of exercise or physical activity: 2-3 days/week for an average of 15-30 minutes    Problems taking medications regularly: No    Medication side effects: none    Diet: diabetic    Would like help with homecare set up. Also coming in to follow up on UTI.     PROBLEMS TO ADD ON...  1. CKD (chronic kidney disease) stage 4, GFR 15-29 ml/min (H)    2. Type 2 diabetes mellitus without complication, without long-term current use of insulin (H) - no issues. Doing well. No signs or symptoms of hyuper or hypo glycemia   3. Hypertension goal BP (blood pressure) < 140/90 - off blood pressure medications. Low blood pressure. No near syncope. Describes some light headedness   4. Recurrent urinary tract infection - no symptoms.    5. Muscle spasm    6. Myoclonic jerking - ongoing as above. Frustrating.    7. Iron deficiency anemia, unspecified iron deficiency anemia type    8. Senile dementia without behavioral disturbance - would like to have hospice referral. Told that  "she could quality. Given the combintion of symptions and conditions, there is a real possibility that she would die in the next year.  Approximately 30%.        Problem list and histories reviewed & adjusted, as indicated.  Additional history: as documented        Reviewed and updated as needed this visit by clinical staff  Tobacco  Allergies  Meds  Med Hx  Surg Hx  Fam Hx  Soc Hx      Reviewed and updated as needed this visit by Provider         1. CKD (chronic kidney disease) stage 4, GFR 15-29 ml/min (H)    2. Type 2 diabetes mellitus without complication, without long-term current use of insulin (H)    3. Hypertension goal BP (blood pressure) < 140/90    4. Recurrent urinary tract infection    5. Muscle spasm    6. Myoclonic jerking    7. Iron deficiency anemia, unspecified iron deficiency anemia type    8. Senile dementia without behavioral disturbance        PMH: Updated and/or reviewed in chart.    PSH: Updated and/or reviewed in chart.    Family History: Updated and/or reviewed in chart.     ROS:  Constitutional, HEENT, cardiovascular, pulmonary, gi and gu systems are otherwise negative.    OBJECTIVE:                                                    BP 90/70  Pulse 100  Temp 97.7  F (36.5  C) (Tympanic)  Ht 5' 1\" (1.549 m)  Wt 101 lb 4.8 oz (45.9 kg)  Breastfeeding? No  BMI 19.14 kg/m2  GENERAL: Pleasant and interactive.  Alert and oriented x 3.  No acute distress. Mildly Confused.  FEET: both sides normal; no swelling, tenderness or skin or vascular lesions.  Sensation is intact. Peripheral pulses are palpable. Toenails are normal.    LABS: Ordered and pending at this time.     Results for orders placed or performed in visit on 03/21/17   UA reflex to Microscopic   Result Value Ref [facility]    Color Urine Yellow     Appearance Urine Clear     Glucose Urine Negative NEG mg/dL    Bilirubin Urine Negative NEG    Ketones Urine Negative NEG mg/dL    Specific Gravity Urine 1.020 1.003 - 1.035    Blood " Urine Small (A) NEG    pH Urine 6.5 5.0 - 7.0 pH    Protein Albumin Urine Trace (A) NEG mg/dL    Urobilinogen Urine 0.2 0.2 - 1.0 EU/dL    Nitrite Urine Negative NEG    Leukocyte Esterase Urine Moderate (A) NEG    Source Midstream Urine    Urine Microscopic   Result Value Ref [facility]    WBC Urine 5-10 (A) 0 - 2 /HPF    RBC Urine O - 2 0 - 2 /HPF    Squamous Epithelial /LPF Urine Few FEW /LPF   Urine Culture Aerobic Bacterial   Result Value Ref [facility]    Specimen Description Midstream Urine     Culture Micro (A)      10,000 to 50,000 colonies/mL Enterococcus faecalis  10,000 to 50,000 colonies/mL mixed urogenital katrina      Micro Report Status FINAL 03/25/2017     Organism: 10,000 to 50,000 colonies/mL Enterococcus faecalis        Susceptibility    10,000 to 50,000 colonies/ml enterococcus faecalis (evita) -  (no method available)     AMPICILLIN <=2 Susceptible  ug/mL     NITROFURANTOIN <=16 Susceptible  ug/mL     PENICILLIN 2 Susceptible  ug/mL     VANCOMYCIN 2 Susceptible  ug/mL     Gentamicin Screen Value in next row        SusceptibleNo high level gentamicin resistance found - If no high level gentamicin resistance is found, combination therapy with an aminoglycoside may be indicated for serious enterococcal infections such as bacteremia and endocarditis.      ASSESSMENT/PLAN:                                                        ICD-10-CM    1. CKD (chronic kidney disease) stage 4, GFR 15-29 ml/min (H) N18.4 HOME CARE NURSING REFERRAL. Discouraged non-steroidal antiinflammatory use.  Acetaminophen is ok.      Comprehensive metabolic panel (BMP + Alb, Alk Phos, ALT, AST, Total. Bili, TP)   2. Type 2 diabetes mellitus without complication, without long-term current use of insulin (H) - good control. E11.9 HOME CARE NURSING REFERRAL     Hemoglobin A1c   3. Hypertension goal BP (blood pressure) < 140/90 I10 HOME CARE NURSING REFERRAL   4. Recurrent urinary tract infection N39.0 *UA reflex to Microscopic and Culture  ([facility] [place] [facility] (except [place] and [place])     HOME CARE NURSING REFERRAL   5. Muscle spasm M62.838 LORazepam (ATIVAN) 0.5 MG tablet     Comprehensive metabolic panel (BMP + Alb, Alk Phos, ALT, AST, Total. Bili, TP)   6. Myoclonic jerking - still unclear as to diagnsis. Concern regarding benzodiazepine use but effective for now. Discussed side effect(s) of this treatment. G25.3 Comprehensive metabolic panel (BMP + Alb, Alk Phos, ALT, AST, Total. Bili, TP)   7. Iron deficiency anemia, unspecified iron deficiency anemia type - make sure no onging issues or blood loss. D50.9 Hemoglobin     Comprehensive metabolic panel (BMP + Alb, Alk Phos, ALT, AST, Total. Bili, TP)     Ferritin   8. Senile dementia without behavioral disturbance - mild.  F03.90        Care plan updated in chart for chronic problems.    Patient Instructions   Labs today.    Follow-up with the neurologist as planned regarding the spasms.     Homecare and hospice referral done.      Ok to use the lorazepam as prescribed and monitor for over sedation or confusion.     Follow-up with me in 3 months.         Orders Placed This Encounter     *UA reflex to Microscopic and Culture ([facility] [place] [facility] (except [place] and [place])     Comprehensive metabolic panel (BMP + Alb, Alk Phos, ALT, AST, Total. Bili, TP)     Ferritin     Hemoglobin A1c     HOME CARE NURSING REFERRAL     LORazepam (ATIVAN) 0.5 MG tablet      Goals     None           Fran Haile MD

## 2017-04-07 NOTE — PATIENT INSTRUCTIONS
Labs today.    Follow-up with the neurologist as planned regarding the spasms.     Homecare and hospice referral done.      Ok to use the lorazepam as prescribed and monitor for over sedation or confusion.     Follow-up with me in 3 months.

## 2017-04-12 ENCOUNTER — CARE COORDINATION (OUTPATIENT)
Dept: CASE MANAGEMENT | Facility: CLINIC | Age: 82
End: 2017-04-12

## 2017-04-12 DIAGNOSIS — M62.838 MUSCLE SPASM: ICD-10-CM

## 2017-04-12 DIAGNOSIS — F03.90 SENILE DEMENTIA (H): ICD-10-CM

## 2017-04-12 DIAGNOSIS — E11.9 TYPE 2 DIABETES MELLITUS (H): ICD-10-CM

## 2017-04-12 DIAGNOSIS — N39.0 RECURRENT URINARY TRACT INFECTION: ICD-10-CM

## 2017-04-12 DIAGNOSIS — N18.4 CKD (CHRONIC KIDNEY DISEASE) STAGE 4, GFR 15-29 ML/MIN (H): Primary | ICD-10-CM

## 2017-04-13 ENCOUNTER — TELEPHONE (OUTPATIENT)
Dept: OPHTHALMOLOGY | Facility: CLINIC | Age: 82
End: 2017-04-13

## 2017-04-13 ENCOUNTER — OFFICE VISIT (OUTPATIENT)
Dept: OPHTHALMOLOGY | Facility: CLINIC | Age: 82
End: 2017-04-13
Payer: COMMERCIAL

## 2017-04-13 DIAGNOSIS — H01.026: Primary | ICD-10-CM

## 2017-04-13 DIAGNOSIS — H43.813 PVD (POSTERIOR VITREOUS DETACHMENT), BILATERAL: ICD-10-CM

## 2017-04-13 DIAGNOSIS — Z96.1 PSEUDOPHAKIA: ICD-10-CM

## 2017-04-13 DIAGNOSIS — H02.106 ECTROPION OF LEFT EYE: ICD-10-CM

## 2017-04-13 DIAGNOSIS — H02.839 DERMATOCHALASIS, UNSPECIFIED LATERALITY: ICD-10-CM

## 2017-04-13 PROCEDURE — 92012 INTRM OPH EXAM EST PATIENT: CPT | Performed by: STUDENT IN AN ORGANIZED HEALTH CARE EDUCATION/TRAINING PROGRAM

## 2017-04-13 RX ORDER — NEOMYCIN SULFATE, POLYMYXIN B SULFATE, AND DEXAMETHASONE 3.5; 10000; 1 MG/G; [USP'U]/G; MG/G
1 OINTMENT OPHTHALMIC 2 TIMES DAILY
Qty: 1 TUBE | Refills: 6 | Status: ON HOLD | OUTPATIENT
Start: 2017-04-13 | End: 2017-05-24

## 2017-04-13 RX ORDER — AMOXICILLIN 500 MG/1
500 CAPSULE ORAL 2 TIMES DAILY
Qty: 20 CAPSULE | Refills: 0 | Status: ON HOLD | OUTPATIENT
Start: 2017-04-13 | End: 2017-05-24

## 2017-04-13 ASSESSMENT — EXTERNAL EXAM - LEFT EYE: OS_EXAM: NORMAL

## 2017-04-13 ASSESSMENT — VISUAL ACUITY
OD_SC+: -2
METHOD: SNELLEN - LINEAR
OS_PH_SC: 20/60
OS_SC: 20/60
OS_SC+: -2
OD_SC: 20/70

## 2017-04-13 ASSESSMENT — EXTERNAL EXAM - RIGHT EYE: OD_EXAM: NORMAL

## 2017-04-13 ASSESSMENT — CUP TO DISC RATIO
OS_RATIO: 0.3 UD
OD_RATIO: 0.3 UD

## 2017-04-13 ASSESSMENT — SLIT LAMP EXAM - LIDS: COMMENTS: 2+ DERMATOCHALASIS - UPPER LID

## 2017-04-13 NOTE — PATIENT INSTRUCTIONS
"Maxitrol ointment in left eye at bedtime  Use artificial tears at least 4 times per day. (Refresh Plus, Systane Balance, or generic artificial tears are ok. Avoid \"get the red out\" drops).   Clean the eyelashes with baby shampoo daily.    Corey Malin MD  [phone]    "

## 2017-04-13 NOTE — PROGRESS NOTES
" Current Eye Medications:  None.      Subjective:  For the past week or two, her left eye has been hurting along with a foreign body sensation, occasionally.  She has a difficult time opening her left eye.  Each morning, her left eye has been mattery.  Starting last night, her right eye began hurting in a similar fashion.  Her vision is very blurred in her right eye.  She has glasses, but didn't bring them today.    History of cataract surgery - Dr. Sandoval.      Objective:  See Ophthalmology Exam.       Assessment:  Xochilt Turjillo is a 89 year old female who presents with:   Encounter Diagnoses   Name Primary?     Squamous blepharitis of left eye, unspecified eyelid      Ectropion of left eye      Pseudophakia with Yag Caps, OU      PVD (posterior vitreous detachment), bilateral      Dermatochalasis, unspecified laterality        Plan:  Maxitrol ointment in left eye at bedtime  Use artificial tears at least 4 times per day. (Refresh Plus, Systane Balance, or generic artificial tears are ok. Avoid \"get the red out\" drops).   Clean the eyelashes with baby shampoo daily.    Corey Malin MD  [phone]      "

## 2017-04-13 NOTE — MR AVS SNAPSHOT
"              After Visit Summary   4/13/2017    Xochilt Trujillo    MRN: [id number]           Patient Information     Date Of Birth          5/28/1924        Visit Information        Provider Department      4/13/2017 12:45 PM Corey Malin MD [facility]        Today's Diagnoses     Squamous blepharitis of left eye, unspecified eyelid    -  1    Ectropion of left eye        Pseudophakia with Yag Caps, OU        PVD (posterior vitreous detachment), bilateral        Dermatochalasis, unspecified laterality        Type 2 diabetes mellitus without complication, without long-term current use of insulin (H)          Care Instructions    Maxitrol ointment in left eye at bedtime  Use artificial tears at least 4 times per day. (Refresh Plus, Systane Balance, or generic artificial tears are ok. Avoid \"get the red out\" drops).   Clean the eyelashes with baby shampoo daily.    Corey Malin MD  [phone]          Follow-ups after your visit        Follow-up notes from your care team     Return in about 2 months (around 6/13/2017) for Complete Exam.      Your next 10 appointments already scheduled     Apr 18, 2017  9:30 AM CDT   Return Visit with Manju Joaquin MD   [facility] ([facility]    [address] [address] [phone]              Who to contact     If you have questions or need follow up information about today's clinic visit or your schedule please contact [facility] directly at [phone].  Normal or non-critical lab and imaging results will be communicated to you by MyChart, letter or phone within 4 business days after the clinic has received the results. If you do not hear from us within 7 days, please contact the clinic through MyChart or phone. If you have a critical or abnormal lab result, we will notify you by phone as soon as possible.  Submit refill requests through [employer] or call your pharmacy and they " will forward the refill request to us. Please allow 3 business days for your refill to be completed.          Additional Information About Your Visit        [employer] Information     [employer] gives you secure access to your electronic health record. If you see a primary care provider, you can also send messages to your care team and make appointments. If you have questions, please call your primary care clinic.  If you do not have a primary care provider, please call [phone] and they will assist you.        Care EveryWhere ID     This is your Care EveryWhere ID. This could be used by other organizations to access your [facility] medical records  [id number]         Blood Pressure from Last 3 Encounters:   04/07/17 90/70   03/15/17 132/70   03/09/17 127/69    Weight from Last 3 Encounters:   04/07/17 45.9 kg (101 lb 4.8 oz)   03/15/17 45.3 kg (99 lb 12.8 oz)   03/09/17 46.3 kg (102 lb 1.2 oz)              Today, you had the following     No orders found for display         Today's Medication Changes          These changes are accurate as of: 4/13/17  1:59 PM.  If you have any questions, ask your nurse or doctor.               Start taking these medicines.        Dose/Directions    neomycin-polymyxin-dexamethasone 3.5-78461-2.1 Oint ophthalmic ointment   Commonly known as:  MAXITROL   Used for:  Squamous blepharitis of left eye, unspecified eyelid, Ectropion of left eye   Started by:  Corey Malin MD        Dose:  1 Application   Place 1 Application Into the left eye 2 times daily   Quantity:  1 Tube   Refills:  6            Where to get your medicines      These medications were sent to [facility] - [address]  [address], [place] [address]     Phone:  [phone]     neomycin-polymyxin-dexamethasone 3.5-57749-5.1 Oint ophthalmic ointment                Primary Care Provider Office Phone # Fax #    Fran Haile [phone]        [facility] [address]  [place] [address]        Thank you!     Thank you for choosing [facility]  for your care. Our goal is always to provide you with excellent care. Hearing back from our patients is one way we can continue to improve our services. Please take a few minutes to complete the written survey that you may receive in the mail after your visit with us. Thank you!             Your Updated Medication List - Protect others around you: Learn how to safely use, store and throw away your medicines at www.disposemymeds.org.          This list is accurate as of: 4/13/17  1:59 PM.  Always use your most recent med list.                   Brand Name Dispense Instructions for use    aspirin 81 MG EC tablet     90 tablet    Take 1 tablet (81 mg) by mouth daily       blood glucose monitoring test strip    no brand specified    3 Box    One touch Ultra test strips testing once daily       LORazepam 0.5 MG tablet    ATIVAN    45 tablet    One half at night and one when spasms occur.       neomycin-polymyxin-dexamethasone 3.5-72184-5.1 Oint ophthalmic ointment    MAXITROL    1 Tube    Place 1 Application Into the left eye 2 times daily       ONE TOUCH DELICA LANCETS Misc     30 each    Blood sugar check once daily       * order for DME     1 Device    Equipment being ordered: walker with wheels and seat       * order for DME     1 Device    Equipment being ordered: First alert notification system.       oxyCODONE-acetaminophen 5-325 MG per tablet    PERCOCET    28 tablet    Take 0.5-1 tablets by mouth every 4 hours as needed for pain       * Notice:  This list has 2 medication(s) that are the same as other medications prescribed for you. Read the directions carefully, and ask your doctor or other care provider to review them with you.

## 2017-04-13 NOTE — TELEPHONE ENCOUNTER
4/13/17    Tone called to say that her mother is having some pain and burning in her left eye for about a week now and it got a little worse last night.  This morning there is some swelling.  If someone can call and speak with Xochilt's daughter about what she should do she would appreciate it.    Cherie Werner  Clinical

## 2017-04-13 NOTE — TELEPHONE ENCOUNTER
Spoke to daughter, her mother woke up in severe eye pain last night and was crying from it.  She says it feels like something is in the eye.  She would like to bring her in today at 12:45 to be seen.

## 2017-04-18 ENCOUNTER — OFFICE VISIT (OUTPATIENT)
Dept: NEUROLOGY | Facility: CLINIC | Age: 82
End: 2017-04-18
Payer: COMMERCIAL

## 2017-04-18 VITALS — SYSTOLIC BLOOD PRESSURE: 122 MMHG | HEART RATE: 100 BPM | TEMPERATURE: 98.3 F | DIASTOLIC BLOOD PRESSURE: 60 MMHG

## 2017-04-18 DIAGNOSIS — Z87.440 HISTORY OF RECURRENT UTIS: ICD-10-CM

## 2017-04-18 DIAGNOSIS — R10.9 FLANK PAIN: Primary | ICD-10-CM

## 2017-04-18 PROCEDURE — 99215 OFFICE O/P EST HI 40 MIN: CPT | Performed by: PSYCHIATRY & NEUROLOGY

## 2017-04-18 NOTE — NURSING NOTE
Ms. Trujillo was unable to void to collect a urine today.  The order was changed to future and supplies sent home with her to collect and bring to a [facility].

## 2017-04-18 NOTE — PROGRESS NOTES
ESTABLISHED PATIENT NEUROLOGY NOTE    DATE OF VISIT: 4/18/2017  MRN: [id number]  PATIENT NAME: Xochilt Trujillo  YOB: 1924    Chief Complaint   Patient presents with     RECHECK     Muscle twitch/ memory concerns.     SUBJECTIVE:                                                      HISTORY OF PRESENT ILLNESS:  Xochilt is here for follow up regarding muscle twitching.    When I met the patient in January, the consult was for muscle twitching. Labs were consistent with her chronic medical problems, but no distinct findings to explain the twitching, except for possibly the chronic kidney dysfunction. I did ask the family to also bring Xochilt in for an electroencephalogram which showed slowing but no epileptiform activity.     The patient was recently seen in the [facility] ED for acute increase in confusion and was found to have a UTI. She was inpatient for a few days in early March for treatment. It was recommended that the patient's family consider nursing home care but they expressed desire to keep Xochilt at home. It appears that she has another UTI on labs from 4.7.17. She is currently on amoxicillin. The patient is accompanied by her daughter in clinic today. She tells me that they recently found out that Xochilt has stage 4 kidney disease. The patient is still living at home with family, but they are discussing hospice given the kidney disease. The patient is not interested in dialysis. She does not know if she has a urologist or nephrologist.     In terms of the twitching, the patient tells me that it mostly happens at night now. Not every day. It does not keep her awake at night. She feels that she generally sleeps well. Her main concern is pain around her sides and lower back. She denies focal weakness.     She is also concerned about her memory. No specific problems from her standpoint, and she defers a lot of the questions/care to her daughters. They have noticed episodes of confusion, but these  have again been complicated by the recurrent UTIs.       CURRENT MEDICATIONS:     Current Outpatient Prescriptions on File Prior to Visit:  neomycin-polymyxin-dexamethasone (MAXITROL) 3.5-85088-1.1 OINT ophthalmic ointment Place 1 Application Into the left eye 2 times daily   amoxicillin (AMOXIL) 500 MG capsule Take 1 capsule (500 mg) by mouth 2 times daily   LORazepam (ATIVAN) 0.5 MG tablet One half at night and one when spasms occur.   oxyCODONE-acetaminophen (PERCOCET) 5-325 MG per tablet Take 0.5-1 tablets by mouth every 4 hours as needed for pain   aspirin 81 MG EC tablet Take 1 tablet (81 mg) by mouth daily   order for DME Equipment being ordered: First alert notification system.   blood glucose test strip One touch Ultra test strips testing once daily   ORDER FOR DME Equipment being ordered: walker with wheels and seat   ONE TOUCH DELICA LANCETS MISC Blood sugar check once daily     No current facility-administered medications on file prior to visit.     RECENT DIAGNOSTIC STUDIES:   Labs:   Results for orders placed or performed in visit on 04/07/17   *UA reflex to Microscopic and Culture ([place] and [facility] (except [place] and [place])   Result Value Ref Range    Color Urine Yellow     Appearance Urine Clear     Glucose Urine Negative NEG mg/dL    Bilirubin Urine (A) NEG     Small  This is an unconfirmed screening test result. A positive result may be false.      Ketones Urine Trace (A) NEG mg/dL    Specific Gravity Urine 1.025 1.003 - 1.035    Blood Urine Negative NEG    pH Urine 5.5 5.0 - 7.0 pH    Protein Albumin Urine Trace (A) NEG mg/dL    Urobilinogen Urine 0.2 0.2 - 1.0 EU/dL    Nitrite Urine Negative NEG    Leukocyte Esterase Urine Moderate (A) NEG    Source Midstream Urine    Hemoglobin   Result Value Ref Range    Hemoglobin 12.4 11.7 - 15.7 g/dL   Comprehensive metabolic panel (BMP + Alb, Alk Phos, ALT, AST, Total. Bili, TP)   Result Value Ref Range    Sodium 140 133 - 144 mmol/L     Potassium 4.3 3.4 - 5.3 mmol/L    Chloride 106 94 - 109 mmol/L    Carbon Dioxide 27 20 - 32 mmol/L    Anion Gap 7 3 - 14 mmol/L    Glucose 119 (H) 70 - 99 mg/dL    Urea Nitrogen 35 (H) 7 - 30 mg/dL    Creatinine 1.39 (H) 0.52 - 1.04 mg/dL    GFR Estimate 35 (L) >60 mL/min/1.7m2    GFR Estimate If Black 43 (L) >60 mL/min/1.7m2    Calcium 8.6 8.5 - 10.1 mg/dL    Bilirubin Total 0.3 0.2 - 1.3 mg/dL    Albumin 3.5 3.4 - 5.0 g/dL    Protein Total 7.3 6.8 - 8.8 g/dL    Alkaline Phosphatase 81 40 - 150 U/L    ALT 26 0 - 50 U/L    AST 21 0 - 45 U/L   Ferritin   Result Value Ref Range    Ferritin 101 8 - 252 ng/mL   Hemoglobin A1c   Result Value Ref Range    Hemoglobin A1C 6.4 (H) 4.3 - 6.0 %   Urine Microscopic   Result Value Ref Range    WBC Urine 2-5 (A) 0 - 2 /HPF    RBC Urine 2-5 (A) 0 - 2 /HPF    Squamous Epithelial /LPF Urine Moderate (A) FEW /LPF    Bacteria Urine Moderate (A) NEG /HPF   Urine Culture Aerobic Bacterial   Result Value Ref Range    Specimen Description Midstream Urine     Culture Micro >100,000 colonies/mL Enterococcus faecalis (A)     Micro Report Status FINAL 04/11/2017     Organism: >100,000 colonies/mL Enterococcus faecalis        Susceptibility    >100,000 colonies/ml enterococcus faecalis (evita) -  (no method available)     AMPICILLIN <=2 Susceptible  ug/mL     NITROFURANTOIN <=16 Susceptible  ug/mL     PENICILLIN 2 Susceptible  ug/mL     VANCOMYCIN 2 Susceptible  ug/mL     Gentamicin Screen Value in next row        SusceptibleNo high level gentamicin resistance found - If no high level gentamicin resistance is found, combination therapy with an aminoglycoside may be indicated for serious enterococcal infections such as bacteremia and endocarditis.     Electroencephalogram (1.19.17):  IMPRESSION:  This outpatient sleep-deprived EEG study is abnormal during wakefulness and sleep due to diffuse generalized slowing consistent with mild to moderate encephalopathy of non-specific etiology, including  toxic, metabolic, degenerative, structural, vascular, and other pathologies. Generalized rhythmic delta activity with frontal predominance might be considered a normal finding in elderly drowsiness. Also, it may represent subcortical dysfunction secondary to vascular disease and/or other degenerative conditions. No interictal epileptiform discharges, no electrographic or clinical seizures, and no paroxysmal behavioral events were seen during this study.    REVIEW OF SYSTEMS:                                                      Variable pain with strength testing and torso + back pain. Otherwise 10-point review of systems is negative except as mentioned above in HPI.     EXAM:                                                      Physical Exam:   Vitals: /60 (BP Location: Right arm, Patient Position: Chair, Cuff Size: Child)  Pulse 100  Temp 98.3  F (36.8  C) (Oral)  BMI= There is no height or weight on file to calculate BMI.  GENERAL: NAD.   Neurologic:  MENTAL STATUS: Alert, attentive. Speech is fluent. Difficulty with complex instructions.  CRANIAL NERVES: Visual fields intact to confrontation. Pupils equally, round and reactive to light. Facial sensation and movement normal. EOM full. Hearing intact to conversation (slightly [place]). Trapezius strength intact. Palate moves symmetrically. Tongue midline.  MOTOR: Give-way weakness at the knees. Some pain with testing. 4+/5 in proximal and distal muscle groups of upper extremities. Tone and bulk normal.   DTRs: Intact and symmetric.   SENSATION: Normal light touch throughout. Vibration decreased at ankles bilaterally.  COORDINATION: Finger tapping appears normal.  STATION AND GAIT: Gait is cautious. Short steps. Slightly stooped.   CV: Tachycardic, regular rhythm. S1, S2.   NECK: No bruits.  Tenderness of flanks and rib cage bilaterally.       ASSESSMENT and PLAN:                                                        Assessment and Plan:    ICD-10-CM    1.  Flank pain R10.9 UA reflex to Microscopic   2. History of recurrent UTIs Z87.440 UA reflex to Microscopic       Ms. Trujillo is a pleasant 89 yo woman with diabetes, CKD- stage IV, history of MI and stroke seen in neurology for muscle twitching. We discussed the electroencephalogram and lab results that I ordered after her last visit. Given the results, I feel that the twitching is benign and very likely related to metabolic derangement related to her other systemic comorbidities. I have not actually observed the twitching in clinic to provide additional insight. We discussed medication options, however given the underlying diseases and the patient's age, my concern would be further damage to the kidneys and potential side effects.     Regarding the memory, we deferred testing today. She is currently being treated for another UTI and is also complaining of flank pain. My concern is that her current UTI has not cleared and though she is afebrile, I am worried about the flank pain. I will send a message to her primary care provider regarding this. She does not appear to have a urologist or nephrologist. Her goals of care seem to be more along the lines of conservative management. The family is talking about hospice. In light of this, I am not sure if thorough work-up of memory concerns fits with her goals. We will do some testing in clinic when she is feeling better.     Patient Instructions:  We discussed the spasms/twitching in clinic today. No seizure activity on the electroencephalogram to explain the twitching. For now, I would not recommend treatment with medication, unless this becomes more bothersome.     Regarding the memory, I would like to test this when you are infection-free, for the most accurate results. For now, we will recheck the urine and I will touch base with Dr. Haile to see if he thinks you need to see him or a specialist regarding the pain/recurrent UTIs.     Return to clinic in 6-8 weeks to  discuss the memory further.     Total Time: 40 minutes were spent with the patient. More than 50% of the time spent on counseling (as described above in Assessment and Plan) /coordinating the care.    Manju Joaquin MD  Neurology

## 2017-04-18 NOTE — PATIENT INSTRUCTIONS
Plan:    We discussed the spasms/twitching in clinic today. No seizure activity on the electroencephalogram to explain the twitching. For now, I would not recommend treatment with medication, unless this becomes more bothersome.     Regarding the memory, I would like to test this when you are infection-free, for the most accurate results. For now, we will recheck the urine and I will touch base with Dr. Haile to see if he thinks you need to see him or a specialist regarding the pain/recurrent UTIs.     Return to clinic in 6-8 weeks to discuss the memory further.

## 2017-04-18 NOTE — NURSING NOTE
"Chief Complaint   Patient presents with     RECHECK     Muscle twitch/ memory concerns.       Initial /60 (BP Location: Right arm, Patient Position: Chair, Cuff Size: Child)  Pulse 100  Temp 98.3  F (36.8  C) (Oral) Estimated body mass index is 19.14 kg/(m^2) as calculated from the following:    Height as of 4/7/17: 5' 1\" (1.549 m).    Weight as of 4/7/17: 101 lb 4.8 oz (45.9 kg).  Medication Reconciliation: complete    Patient prefers to be contacted: letter  Okay to leave detailed message on voicemail: n/a    Tonya LYNN-CMA    "

## 2017-04-18 NOTE — MR AVS SNAPSHOT
After Visit Summary   4/18/2017    Xochilt Trujillo    MRN: [id number]           Patient Information     Date Of Birth          5/28/1924        Visit Information        Provider Department      4/18/2017 9:30 AM Manju Joaquin MD [facility]        Today's Diagnoses     Flank pain    -  1    History of recurrent UTIs          Care Instructions    Plan:    We discussed the spasms/twitching in clinic today. No seizure activity on the electroencephalogram to explain the twitching. For now, I would not recommend treatment with medication, unless this becomes more bothersome.     Regarding the memory, I would like to test this when you are infection-free, for the most accurate results. For now, we will recheck the urine and I will touch base with Dr. Haile to see if he thinks you need to see him or a specialist regarding the pain/recurrent UTIs.     Return to clinic in 6-8 weeks to discuss the memory further.         Follow-ups after your visit        Follow-up notes from your care team     Return in about 2 months (around 6/18/2017).      Who to contact     If you have questions or need follow up information about today's clinic visit or your schedule please contact [facility] directly at [phone].  Normal or non-critical lab and imaging results will be communicated to you by MyChart, letter or phone within 4 business days after the clinic has received the results. If you do not hear from us within 7 days, please contact the clinic through [employer] or phone. If you have a critical or abnormal lab result, we will notify you by phone as soon as possible.  Submit refill requests through DocSend or call your pharmacy and they will forward the refill request to us. Please allow 3 business days for your refill to be completed.          Additional Information About Your Visit        [employer] Information     DocSend gives you secure access to your electronic health record. If  you see a primary care provider, you can also send messages to your care team and make appointments. If you have questions, please call your primary care clinic.  If you do not have a primary care provider, please call [phone] and they will assist you.        Care EveryWhere ID     This is your Care EveryWhere ID. This could be used by other organizations to access your [facility] medical records  [id number]        Your Vitals Were     Pulse Temperature                100 98.3  F (36.8  C) (Oral)           Blood Pressure from Last 3 Encounters:   04/18/17 122/60   04/07/17 90/70   03/15/17 132/70    Weight from Last 3 Encounters:   04/07/17 101 lb 4.8 oz (45.9 kg)   03/15/17 99 lb 12.8 oz (45.3 kg)   03/09/17 102 lb 1.2 oz (46.3 kg)              We Performed the Following     UA reflex to Microscopic        Primary Care Provider Office Phone # Fax #    Fran Haile [phone]       [facility] [address] [facility] [address]        Thank you!     Thank you for choosing [facility]  for your care. Our goal is always to provide you with excellent care. Hearing back from our patients is one way we can continue to improve our services. Please take a few minutes to complete the written survey that you may receive in the mail after your visit with us. Thank you!             Your Updated Medication List - Protect others around you: Learn how to safely use, store and throw away your medicines at www.disposemymeds.org.          This list is accurate as of: 4/18/17 10:15 AM.  Always use your most recent med list.                   Brand Name Dispense Instructions for use    amoxicillin 500 MG capsule    AMOXIL    20 capsule    Take 1 capsule (500 mg) by mouth 2 times daily       aspirin 81 MG EC tablet     90 tablet    Take 1 tablet (81 mg) by mouth daily       blood glucose monitoring test strip    no brand specified    3 Box    One touch Ultra test strips  testing once daily       LORazepam 0.5 MG tablet    ATIVAN    45 tablet    One half at night and one when spasms occur.       neomycin-polymyxin-dexamethasone 3.5-84478-1.1 Oint ophthalmic ointment    MAXITROL    1 Tube    Place 1 Application Into the left eye 2 times daily       ONE TOUCH DELICA LANCETS Misc     30 each    Blood sugar check once daily       * order for DME     1 Device    Equipment being ordered: walker with wheels and seat       * order for DME     1 Device    Equipment being ordered: First alert notification system.       oxyCODONE-acetaminophen 5-325 MG per tablet    PERCOCET    28 tablet    Take 0.5-1 tablets by mouth every 4 hours as needed for pain       * Notice:  This list has 2 medication(s) that are the same as other medications prescribed for you. Read the directions carefully, and ask your doctor or other care provider to review them with you.

## 2017-04-19 DIAGNOSIS — R10.9 FLANK PAIN: ICD-10-CM

## 2017-04-19 DIAGNOSIS — Z87.440 HISTORY OF RECURRENT UTIS: ICD-10-CM

## 2017-04-19 LAB
ALBUMIN UR-MCNC: 100 MG/DL
APPEARANCE UR: CLEAR
BACTERIA #/AREA URNS HPF: ABNORMAL /HPF
BILIRUB UR QL STRIP: ABNORMAL
COLOR UR AUTO: YELLOW
GLUCOSE UR STRIP-MCNC: 250 MG/DL
GRAN CASTS #/AREA URNS LPF: ABNORMAL /LPF
HGB UR QL STRIP: ABNORMAL
KETONES UR STRIP-MCNC: ABNORMAL MG/DL
LEUKOCYTE ESTERASE UR QL STRIP: NEGATIVE
NITRATE UR QL: NEGATIVE
NON-SQ EPI CELLS #/AREA URNS LPF: ABNORMAL /LPF
PH UR STRIP: 5.5 PH (ref 5–7)
RBC #/AREA URNS AUTO: ABNORMAL /HPF (ref 0–2)
SP GR UR STRIP: >1.03 (ref 1–1.03)
URN SPEC COLLECT METH UR: ABNORMAL
UROBILINOGEN UR STRIP-ACNC: 1 EU/DL (ref 0.2–1)
WBC #/AREA URNS AUTO: ABNORMAL /HPF (ref 0–2)

## 2017-04-19 PROCEDURE — 81001 URINALYSIS AUTO W/SCOPE: CPT | Performed by: PSYCHIATRY & NEUROLOGY

## 2017-04-21 ENCOUNTER — CARE COORDINATION (OUTPATIENT)
Dept: CASE MANAGEMENT | Facility: CLINIC | Age: 82
End: 2017-04-21

## 2017-04-21 NOTE — LETTER
[facility] - CARE MANAGEMENT DEPT  [address] [address]  [place] [address]  Phone: [phone]      April 26, 2017      Xochilt Trujillo  [address] [place] [facility] [address]    Dear Xochilt,  I am the Clinic Care Coordinator that works with your primary care provider's clinic. I wanted to thank you for spending the time to talk with me.  Below is a description of what Clinic Care Coordination is and how I can further assist you.     The Clinic Care Coordinator role is a Registered Nurse and/or  who understands the health care system. The goal of Clinic Care Coordination is to help you manage your health and improve access to the [facility] system in the most efficient manner.  The Registered Nurse can assist you in meeting your health care goals by providing education, coordinating services, and strengthening the communication among your providers. The  can assist you with financial, behavioral, psychosocial, and chemical dependency and counseling/psychiatric resources.    Please feel free to keep this letter and contact information to contact me at [phone] with any further questions or concerns that may arise.       Sincerely,     Val Boyer    Enclosed: Information about home care agencies.

## 2017-04-21 NOTE — PROGRESS NOTES
Clinic Care Coordination Contact  OUTREACH    Follow-up on hospice referral. Patient did not meet the requirements. Pt's daughter did find the consult beneficial as she is more aware of the guidelines moving forward and was able to receive more information about the program. Pt's daughter reported that pt had a good day yesterday when they were at the house and hopes it continues.  Discussed need for additional resources. Pt's daughter is interested in home care supports. Will mail out home care agencies in her area along with some community supports that may be helpful. Follow-up in 2-3 weeks.    ANGÉLICA Helton  [facility] Care Coordinator  [phone]

## 2017-04-24 ENCOUNTER — TELEPHONE (OUTPATIENT)
Dept: FAMILY MEDICINE | Facility: CLINIC | Age: 82
End: 2017-04-24

## 2017-04-24 NOTE — TELEPHONE ENCOUNTER
Daughter called mom has bloody stools .     Call transferred to [facility]

## 2017-04-24 NOTE — TELEPHONE ENCOUNTER
Patient's daughter called to report that she had 2 BM's today that had blood in them. Stool is soft and she had about a dime size of bright red blood on toilet paper. She also has had some pain in her stomach on/off for the past 2-3 days. No temp or nausea. She is drinking a fair amount of water. Advised to have patient drink more water. If she continues to have bloody stools or stool becomes black and tarry, spike a temp, has more pain in her side, she should be seen in urgent care or emergency room. Daughter agreed.   Elida Foreman RN

## 2017-04-24 NOTE — PROGRESS NOTES
Ms. Trujillo,    So, other than some sugar, there are no major issues with your current urine analysis.  There was some sugar in your urine but this happens with diabetes mellitus from time to time.      Checking your fingerstick blood sugar levels periodically is a good idea.          Please contact the clinic if you have additional questions.  Thank you.    Sincerely,    Jaylan Haile MD

## 2017-05-08 ENCOUNTER — TELEPHONE (OUTPATIENT)
Dept: FAMILY MEDICINE | Facility: CLINIC | Age: 82
End: 2017-05-08

## 2017-05-08 DIAGNOSIS — Z87.440 PERSONAL HISTORY OF URINARY TRACT INFECTION: Primary | ICD-10-CM

## 2017-05-08 DIAGNOSIS — N30.01 ACUTE CYSTITIS WITH HEMATURIA: Primary | ICD-10-CM

## 2017-05-08 DIAGNOSIS — R82.90 NONSPECIFIC FINDING ON EXAMINATION OF URINE: ICD-10-CM

## 2017-05-08 DIAGNOSIS — N39.0 RECURRENT URINARY TRACT INFECTION: Primary | ICD-10-CM

## 2017-05-08 LAB
ALBUMIN UR-MCNC: 30 MG/DL
APPEARANCE UR: ABNORMAL
BACTERIA #/AREA URNS HPF: ABNORMAL /HPF
BILIRUB UR QL STRIP: NEGATIVE
COLOR UR AUTO: YELLOW
GLUCOSE UR STRIP-MCNC: NEGATIVE MG/DL
HGB UR QL STRIP: ABNORMAL
KETONES UR STRIP-MCNC: ABNORMAL MG/DL
LEUKOCYTE ESTERASE UR QL STRIP: ABNORMAL
NITRATE UR QL: POSITIVE
NON-SQ EPI CELLS #/AREA URNS LPF: ABNORMAL /LPF
PH UR STRIP: 5 PH (ref 5–7)
RBC #/AREA URNS AUTO: ABNORMAL /HPF (ref 0–2)
SP GR UR STRIP: 1.02 (ref 1–1.03)
URN SPEC COLLECT METH UR: ABNORMAL
UROBILINOGEN UR STRIP-ACNC: 0.2 EU/DL (ref 0.2–1)
WBC #/AREA URNS AUTO: ABNORMAL /HPF (ref 0–2)

## 2017-05-08 PROCEDURE — 81001 URINALYSIS AUTO W/SCOPE: CPT | Performed by: FAMILY MEDICINE

## 2017-05-08 PROCEDURE — 87088 URINE BACTERIA CULTURE: CPT | Performed by: FAMILY MEDICINE

## 2017-05-08 PROCEDURE — 87086 URINE CULTURE/COLONY COUNT: CPT | Performed by: FAMILY MEDICINE

## 2017-05-08 PROCEDURE — 87186 SC STD MICRODIL/AGAR DIL: CPT | Performed by: FAMILY MEDICINE

## 2017-05-08 RX ORDER — CIPROFLOXACIN 500 MG/1
500 TABLET, FILM COATED ORAL 2 TIMES DAILY
Qty: 14 TABLET | Refills: 0 | Status: ON HOLD | OUTPATIENT
Start: 2017-05-08 | End: 2017-05-24

## 2017-05-08 NOTE — LETTER
[address]  [address]  [phone]      May 15, 2017      Xochilt ERNIE Ronnie  [address] [place] [address]              Dear Ms. Ronnie,    Your urine culture grew out bacteria that is sensitive to the antibiotic you have been given.  Complete the medication as prescribed and if you experience new, worsening or persistent symptoms, you should call or return for a recheck.     Please contact the clinic if you have additional questions.  Thank you.       Sincerely,    Fran Haile MD/FABIENNE CMA

## 2017-05-15 ENCOUNTER — OFFICE VISIT (OUTPATIENT)
Dept: OPTOMETRY | Facility: CLINIC | Age: 82
End: 2017-05-15
Payer: COMMERCIAL

## 2017-05-15 DIAGNOSIS — H43.813 PVD (POSTERIOR VITREOUS DETACHMENT), BILATERAL: Chronic | ICD-10-CM

## 2017-05-15 DIAGNOSIS — H52.13 MYOPIA OF BOTH EYES WITH ASTIGMATISM AND PRESBYOPIA: ICD-10-CM

## 2017-05-15 DIAGNOSIS — E11.9 TYPE 2 DIABETES MELLITUS WITHOUT RETINOPATHY (H): ICD-10-CM

## 2017-05-15 DIAGNOSIS — H52.4 MYOPIA OF BOTH EYES WITH ASTIGMATISM AND PRESBYOPIA: ICD-10-CM

## 2017-05-15 DIAGNOSIS — Z01.00 EXAMINATION OF EYES AND VISION: Primary | ICD-10-CM

## 2017-05-15 DIAGNOSIS — H52.203 MYOPIA OF BOTH EYES WITH ASTIGMATISM AND PRESBYOPIA: ICD-10-CM

## 2017-05-15 DIAGNOSIS — Z96.1 PSEUDOPHAKIA: ICD-10-CM

## 2017-05-15 PROCEDURE — 92015 DETERMINE REFRACTIVE STATE: CPT | Performed by: OPTOMETRIST

## 2017-05-15 PROCEDURE — 92014 COMPRE OPH EXAM EST PT 1/>: CPT | Performed by: OPTOMETRIST

## 2017-05-15 ASSESSMENT — REFRACTION_MANIFEST
OD_CYLINDER: +0.50
OS_ADD: +3.00
OD_AXIS: 180
OD_SPHERE: -1.25
OD_ADD: +3.00
OS_AXIS: 150
OS_HPRISM: 2.0
OS_SPHERE: -0.50
OS_CYLINDER: +0.50
OD_HPRISM: 2.0

## 2017-05-15 ASSESSMENT — REFRACTION_WEARINGRX
OD_CYLINDER: SPHERE
OS_AXIS: 150
OD_SPHERE: -1.00
OD_ADD: +3.00
OS_CYLINDER: +0.50
OS_SPHERE: -0.50
OS_ADD: +3.00

## 2017-05-15 ASSESSMENT — REFRACTION_FINALRX
OS_HPRISM: 2.0
OD_HPRISM: 2.0
OD_HBASE: OUT
OS_HBASE: OUT

## 2017-05-15 ASSESSMENT — TONOMETRY
OD_IOP_MMHG: 15
IOP_METHOD: APPLANATION
OS_IOP_MMHG: 15

## 2017-05-15 ASSESSMENT — VISUAL ACUITY
OS_SC: 20/120
OD_SC: 20/70
OD_CC+: -1
CORRECTION_TYPE: GLASSES
OS_CC: 20/25
OD_CC: 20/60
OS_CC: 20/60
OS_SC+: -2
OD_CC: 20/40
OD_SC: 20/120
METHOD: SNELLEN - LINEAR
OS_SC: 20/30

## 2017-05-15 ASSESSMENT — EXTERNAL EXAM - RIGHT EYE: OD_EXAM: NORMAL

## 2017-05-15 ASSESSMENT — CUP TO DISC RATIO
OD_RATIO: 0.3
OS_RATIO: 0.3

## 2017-05-15 ASSESSMENT — EXTERNAL EXAM - LEFT EYE: OS_EXAM: NORMAL

## 2017-05-15 ASSESSMENT — SLIT LAMP EXAM - LIDS
COMMENTS: 2+ DERMATOCHALASIS - UPPER LID
COMMENTS: 2+ DERMATOCHALASIS - UPPER LID

## 2017-05-15 NOTE — PROGRESS NOTES
Chief Complaint   Patient presents with     Diabetic Eye Exam     Accompanied by self  Lab Results   Component Value Date    A1C 6.4 04/07/2017    A1C 6.1 10/11/2016    A1C 6.4 03/07/2016    A1C 6.4 07/29/2015    A1C 6.1 04/22/2015       Last Eye Exam: 1 year ago  Dilated Previously: Yes    What are you currently using to see?  glasses    Distance Vision Acuity: Noticed gradual change in both eyes    Near Vision Acuity: Not satisfied     Eye Comfort: dry  Do you use eye drops? : No  Occupation or Hobbies: retired    [employer] [place], [employer]     Medical, surgical and family histories reviewed and updated 5/15/2017.       OBJECTIVE: See Ophthalmology exam    ASSESSMENT:    ICD-10-CM    1. Examination of eyes and vision Z01.00 EYE EXAM (SIMPLE-NONBILLABLE)     REFRACTION   2. Type 2 diabetes mellitus without retinopathy (H) E11.9 EYE EXAM (SIMPLE-NONBILLABLE)   3. Pseudophakia with Yag Caps, OU Z96.1 EYE EXAM (SIMPLE-NONBILLABLE)   4. PVD (posterior vitreous detachment), bilateral H43.813 EYE EXAM (SIMPLE-NONBILLABLE)   5. Myopia of both eyes with astigmatism and presbyopia H52.13 EYE EXAM (SIMPLE-NONBILLABLE)    H52.203 REFRACTION    H52.4       PLAN:  Monitor, Keep blood sugar under control  Sent letter to primary care provider regarding diabetes.  Xochilt Trujillo aware  eye exam results will be sent to Fran Haile.    A posterior vitreous detachment is nothing to worry about.  You have a jelly like substance that fills the inside of the eye, as you get older, that gel shrinks a little and when it shrinks, it pulls away from the back of the eye.  When it pulls away you can see a floater, as time goes on, the floater may shrink down out of your line of sight and you won't notice it so often.  There is a little greater risk of developing a retinal detachment since there's nothing pressing against the retina, so if you start to notice flashes of light or sudden vision changes, return to the clinic as  soon as possible, otherwise return as needed.    A final glasses prescription was given.  Allow time for adaptation.  The glasses may cause dizziness and affect depth perception for awhile.  Return to clinic 1 year for Comprehensive Vision Exam      Annabella Andrea O.D  [address] [place]  [address]    [phone]

## 2017-05-15 NOTE — MR AVS SNAPSHOT
After Visit Summary   5/15/2017    Xochilt Trujillo    MRN: [id number]           Patient Information     Date Of Birth          5/28/1924        Visit Information        Provider Department      5/15/2017 11:30 AM Annabella Andrea OD [facility]        Today's Diagnoses     Examination of eyes and vision    -  1    Type 2 diabetes mellitus without retinopathy (H)        Pseudophakia with Yag Caps, OU        PVD (posterior vitreous detachment), bilateral        Myopia of both eyes with astigmatism and presbyopia          Care Instructions        Monitor, Keep blood sugar under control  Sent letter to primary care provider regarding diabetes    A posterior vitreous detachment is nothing to worry about.  You have a jelly like substance that fills the inside of the eye, as you get older, that gel shrinks a little and when it shrinks, it pulls away from the back of the eye.  When it pulls away you can see a floater, as time goes on, the floater may shrink down out of your line of sight and you won't notice it so often.  There is a little greater risk of developing a retinal detachment since there's nothing pressing against the retina, so if you start to notice flashes of light or sudden vision changes, return to the clinic as soon as possible, otherwise return as needed.    A final glasses prescription was given.  Allow time for adaptation.  The glasses may cause dizziness and affect depth perception for awhile.  Return to clinic 1 year for Comprehensive Vision Exam      Annabella Andrea O.D  [facility] [place]  [address] [place]  [address]    [phone]                  Follow-ups after your visit        Follow-up notes from your care team     Return in about 1 year (around 5/15/2018) for Eye Exam.      Who to contact     If you have questions or need follow up information about today's clinic visit or your schedule please contact [facility] directly  at [phone].  Normal or non-critical lab and imaging results will be communicated to you by MyChart, letter or phone within 4 business days after the clinic has received the results. If you do not hear from us within 7 days, please contact the clinic through [employer] or phone. If you have a critical or abnormal lab result, we will notify you by phone as soon as possible.  Submit refill requests through [employer] or call your pharmacy and they will forward the refill request to us. Please allow 3 business days for your refill to be completed.          Additional Information About Your Visit        [employer] Information     [employer] gives you secure access to your electronic health record. If you see a primary care provider, you can also send messages to your care team and make appointments. If you have questions, please call your primary care clinic.  If you do not have a primary care provider, please call [phone] and they will assist you.        Care EveryWhere ID     This is your Care EveryWhere ID. This could be used by other organizations to access your [facility] medical records  [id number]         Blood Pressure from Last 3 Encounters:   04/18/17 122/60   04/07/17 90/70   03/15/17 132/70    Weight from Last 3 Encounters:   04/07/17 45.9 kg (101 lb 4.8 oz)   03/15/17 45.3 kg (99 lb 12.8 oz)   03/09/17 46.3 kg (102 lb 1.2 oz)              We Performed the Following     EYE EXAM (SIMPLE-NONBILLABLE)     REFRACTION        Primary Care Provider Office Phone # Fax #    Fran Haile [phone]       [facility] [address] [facility] [address]        Thank you!     Thank you for choosing [facility]  for your care. Our goal is always to provide you with excellent care. Hearing back from our patients is one way we can continue to improve our services. Please take a few minutes to complete the written survey that you may receive in the mail after your visit  with us. Thank you!             Your Updated Medication List - Protect others around you: Learn how to safely use, store and throw away your medicines at www.disposemymeds.org.          This list is accurate as of: 5/15/17  1:03 PM.  Always use your most recent med list.                   Brand Name Dispense Instructions for use    amoxicillin 500 MG capsule    AMOXIL    20 capsule    Take 1 capsule (500 mg) by mouth 2 times daily       aspirin 81 MG EC tablet     90 tablet    Take 1 tablet (81 mg) by mouth daily       blood glucose monitoring test strip    no brand specified    3 Box    One touch Ultra test strips testing once daily       ciprofloxacin 500 MG tablet    CIPRO    14 tablet    Take 1 tablet (500 mg) by mouth 2 times daily       LORazepam 0.5 MG tablet    ATIVAN    45 tablet    One half at night and one when spasms occur.       neomycin-polymyxin-dexamethasone 3.5-21734-1.1 Oint ophthalmic ointment    MAXITROL    1 Tube    Place 1 Application Into the left eye 2 times daily       ONE TOUCH DELICA LANCETS Misc     30 each    Blood sugar check once daily       * order for DME     1 Device    Equipment being ordered: walker with wheels and seat       * order for DME     1 Device    Equipment being ordered: First alert notification system.       oxyCODONE-acetaminophen 5-325 MG per tablet    PERCOCET    28 tablet    Take 0.5-1 tablets by mouth every 4 hours as needed for pain       * Notice:  This list has 2 medication(s) that are the same as other medications prescribed for you. Read the directions carefully, and ask your doctor or other care provider to review them with you.

## 2017-05-15 NOTE — PROGRESS NOTES
Ms. Trujillo,    Your urine culture grew out bacteria that is sensitive to the antibiotic you have been given.  Complete the medication as prescribed and if you experience new, worsening or persistent symptoms, you should call or return for a recheck.     Please contact the clinic if you have additional questions.  Thank you.    Sincerely,    Jaylan Haile MD

## 2017-05-23 ENCOUNTER — APPOINTMENT (OUTPATIENT)
Dept: CT IMAGING | Facility: CLINIC | Age: 82
DRG: 948 | End: 2017-05-23
Attending: EMERGENCY MEDICINE
Payer: COMMERCIAL

## 2017-05-23 ENCOUNTER — HOSPITAL ENCOUNTER (INPATIENT)
Facility: CLINIC | Age: 82
LOS: 2 days | Discharge: HOME HEALTH | DRG: 948 | End: 2017-05-26
Attending: EMERGENCY MEDICINE | Admitting: INTERNAL MEDICINE
Payer: COMMERCIAL

## 2017-05-23 ENCOUNTER — APPOINTMENT (OUTPATIENT)
Dept: MRI IMAGING | Facility: CLINIC | Age: 82
DRG: 948 | End: 2017-05-23
Attending: EMERGENCY MEDICINE
Payer: COMMERCIAL

## 2017-05-23 ENCOUNTER — APPOINTMENT (OUTPATIENT)
Dept: GENERAL RADIOLOGY | Facility: CLINIC | Age: 82
DRG: 948 | End: 2017-05-23
Attending: EMERGENCY MEDICINE
Payer: COMMERCIAL

## 2017-05-23 ENCOUNTER — TELEPHONE (OUTPATIENT)
Dept: NURSING | Facility: CLINIC | Age: 82
End: 2017-05-23

## 2017-05-23 ENCOUNTER — TELEPHONE (OUTPATIENT)
Dept: FAMILY MEDICINE | Facility: CLINIC | Age: 82
End: 2017-05-23

## 2017-05-23 DIAGNOSIS — I16.0 HYPERTENSIVE URGENCY: ICD-10-CM

## 2017-05-23 DIAGNOSIS — I25.10 ATHEROSCLEROSIS OF NATIVE CORONARY ARTERY OF NATIVE HEART WITHOUT ANGINA PECTORIS: ICD-10-CM

## 2017-05-23 DIAGNOSIS — Z87.440 PERSONAL HISTORY OF URINARY TRACT INFECTION: ICD-10-CM

## 2017-05-23 DIAGNOSIS — R41.0 ACUTE DELIRIUM: ICD-10-CM

## 2017-05-23 DIAGNOSIS — Z86.73 PERSONAL HISTORY OF TRANSIENT CEREBRAL ISCHEMIA: ICD-10-CM

## 2017-05-23 LAB
ALBUMIN SERPL-MCNC: 3.3 G/DL (ref 3.4–5)
ALBUMIN UR-MCNC: ABNORMAL MG/DL
ALBUMIN UR-MCNC: NEGATIVE MG/DL
ALP SERPL-CCNC: 83 U/L (ref 40–150)
ALT SERPL W P-5'-P-CCNC: 23 U/L (ref 0–50)
ANION GAP SERPL CALCULATED.3IONS-SCNC: 9 MMOL/L (ref 3–14)
APPEARANCE UR: CLEAR
APPEARANCE UR: CLEAR
AST SERPL W P-5'-P-CCNC: 19 U/L (ref 0–45)
BACTERIA #/AREA URNS HPF: ABNORMAL /HPF
BASOPHILS # BLD AUTO: 0 10E9/L (ref 0–0.2)
BASOPHILS NFR BLD AUTO: 0.4 %
BILIRUB SERPL-MCNC: 0.3 MG/DL (ref 0.2–1.3)
BILIRUB UR QL STRIP: NEGATIVE
BILIRUB UR QL STRIP: NEGATIVE
BUN SERPL-MCNC: 28 MG/DL (ref 7–30)
CA-I BLD-SCNC: 4.8 MG/DL (ref 4.4–5.2)
CALCIUM SERPL-MCNC: 9.1 MG/DL (ref 8.5–10.1)
CHLORIDE SERPL-SCNC: 108 MMOL/L (ref 94–109)
CO2 BLDCOV-SCNC: 26 MMOL/L (ref 21–28)
CO2 SERPL-SCNC: 24 MMOL/L (ref 20–32)
COLOR UR AUTO: ABNORMAL
COLOR UR AUTO: YELLOW
CREAT BLD-MCNC: 1.5 MG/DL (ref 0.52–1.04)
CREAT SERPL-MCNC: 1.38 MG/DL (ref 0.52–1.04)
DIFFERENTIAL METHOD BLD: ABNORMAL
EOSINOPHIL # BLD AUTO: 0.2 10E9/L (ref 0–0.7)
EOSINOPHIL NFR BLD AUTO: 1.7 %
ERYTHROCYTE [DISTWIDTH] IN BLOOD BY AUTOMATED COUNT: 15.3 % (ref 10–15)
GFR SERPL CREATININE-BSD FRML MDRD: 32 ML/MIN/1.7M2
GFR SERPL CREATININE-BSD FRML MDRD: 36 ML/MIN/1.7M2
GLUCOSE BLD-MCNC: 129 MG/DL (ref 70–99)
GLUCOSE SERPL-MCNC: 128 MG/DL (ref 70–99)
GLUCOSE UR STRIP-MCNC: NEGATIVE MG/DL
GLUCOSE UR STRIP-MCNC: NEGATIVE MG/DL
HCT VFR BLD AUTO: 37.9 % (ref 35–47)
HCT VFR BLD CALC: 36 %PCV (ref 35–47)
HGB BLD CALC-MCNC: 12.2 G/DL (ref 11.7–15.7)
HGB BLD-MCNC: 12.2 G/DL (ref 11.7–15.7)
HGB UR QL STRIP: NEGATIVE
HGB UR QL STRIP: NEGATIVE
IMM GRANULOCYTES # BLD: 0 10E9/L (ref 0–0.4)
IMM GRANULOCYTES NFR BLD: 0.3 %
INR BLD: 0.9 (ref 0.86–1.14)
INR PPP: 0.96 (ref 0.86–1.14)
KETONES UR STRIP-MCNC: ABNORMAL MG/DL
KETONES UR STRIP-MCNC: NEGATIVE MG/DL
LEUKOCYTE ESTERASE UR QL STRIP: ABNORMAL
LEUKOCYTE ESTERASE UR QL STRIP: ABNORMAL
LYMPHOCYTES # BLD AUTO: 2.2 10E9/L (ref 0.8–5.3)
LYMPHOCYTES NFR BLD AUTO: 24.3 %
MCH RBC QN AUTO: 27.4 PG (ref 26.5–33)
MCHC RBC AUTO-ENTMCNC: 32.2 G/DL (ref 31.5–36.5)
MCV RBC AUTO: 85 FL (ref 78–100)
MONOCYTES # BLD AUTO: 0.6 10E9/L (ref 0–1.3)
MONOCYTES NFR BLD AUTO: 6.5 %
NEUTROPHILS # BLD AUTO: 6 10E9/L (ref 1.6–8.3)
NEUTROPHILS NFR BLD AUTO: 66.8 %
NITRATE UR QL: NEGATIVE
NITRATE UR QL: NEGATIVE
NON-SQ EPI CELLS #/AREA URNS LPF: ABNORMAL /LPF
NRBC # BLD AUTO: 0 10*3/UL
NRBC BLD AUTO-RTO: 0 /100
NT-PROBNP SERPL-MCNC: 331 PG/ML (ref 0–1800)
PCO2 BLDV: 39 MM HG (ref 40–50)
PH BLDV: 7.43 PH (ref 7.32–7.43)
PH UR STRIP: 6 PH (ref 5–7)
PH UR STRIP: 7 PH (ref 5–7)
PLATELET # BLD AUTO: 264 10E9/L (ref 150–450)
PO2 BLDV: 19 MM HG (ref 25–47)
POTASSIUM BLD-SCNC: 4.2 MMOL/L (ref 3.4–5.3)
POTASSIUM SERPL-SCNC: 4 MMOL/L (ref 3.4–5.3)
PROT SERPL-MCNC: 7.6 G/DL (ref 6.8–8.8)
RBC # BLD AUTO: 4.45 10E12/L (ref 3.8–5.2)
RBC #/AREA URNS AUTO: 0 /HPF (ref 0–2)
RBC #/AREA URNS AUTO: ABNORMAL /HPF (ref 0–2)
SAO2 % BLDV FROM PO2: 31 %
SODIUM BLD-SCNC: 142 MMOL/L (ref 133–144)
SODIUM SERPL-SCNC: 141 MMOL/L (ref 133–144)
SP GR UR STRIP: 1.01 (ref 1–1.03)
SP GR UR STRIP: 1.02 (ref 1–1.03)
SQUAMOUS #/AREA URNS AUTO: 1 /HPF (ref 0–1)
TRANS CELLS #/AREA URNS HPF: <1 /HPF (ref 0–1)
TROPONIN I BLD-MCNC: 0 UG/L (ref 0–0.1)
TROPONIN I SERPL-MCNC: NORMAL UG/L (ref 0–0.04)
URN SPEC COLLECT METH UR: ABNORMAL
URN SPEC COLLECT METH UR: ABNORMAL
UROBILINOGEN UR STRIP-ACNC: 0.2 EU/DL (ref 0.2–1)
UROBILINOGEN UR STRIP-MCNC: NORMAL MG/DL (ref 0–2)
WBC # BLD AUTO: 9 10E9/L (ref 4–11)
WBC #/AREA URNS AUTO: 4 /HPF (ref 0–2)
WBC #/AREA URNS AUTO: ABNORMAL /HPF (ref 0–2)

## 2017-05-23 PROCEDURE — 81001 URINALYSIS AUTO W/SCOPE: CPT | Performed by: EMERGENCY MEDICINE

## 2017-05-23 PROCEDURE — 40000497 ZZHCL STATISTIC SODIUM ED POCT

## 2017-05-23 PROCEDURE — 25000128 H RX IP 250 OP 636: Performed by: EMERGENCY MEDICINE

## 2017-05-23 PROCEDURE — 96365 THER/PROPH/DIAG IV INF INIT: CPT | Performed by: EMERGENCY MEDICINE

## 2017-05-23 PROCEDURE — 25000132 ZZH RX MED GY IP 250 OP 250 PS 637: Performed by: EMERGENCY MEDICINE

## 2017-05-23 PROCEDURE — 84484 ASSAY OF TROPONIN QUANT: CPT

## 2017-05-23 PROCEDURE — 71010 XR CHEST PORT 1 VW: CPT

## 2017-05-23 PROCEDURE — 83880 ASSAY OF NATRIURETIC PEPTIDE: CPT | Performed by: EMERGENCY MEDICINE

## 2017-05-23 PROCEDURE — 99291 CRITICAL CARE FIRST HOUR: CPT | Mod: 25 | Performed by: EMERGENCY MEDICINE

## 2017-05-23 PROCEDURE — 84484 ASSAY OF TROPONIN QUANT: CPT | Performed by: EMERGENCY MEDICINE

## 2017-05-23 PROCEDURE — 99285 EMERGENCY DEPT VISIT HI MDM: CPT | Mod: 25 | Performed by: EMERGENCY MEDICINE

## 2017-05-23 PROCEDURE — 85610 PROTHROMBIN TIME: CPT | Mod: QW

## 2017-05-23 PROCEDURE — 82803 BLOOD GASES ANY COMBINATION: CPT

## 2017-05-23 PROCEDURE — 70551 MRI BRAIN STEM W/O DYE: CPT

## 2017-05-23 PROCEDURE — 80053 COMPREHEN METABOLIC PANEL: CPT | Performed by: EMERGENCY MEDICINE

## 2017-05-23 PROCEDURE — 70450 CT HEAD/BRAIN W/O DYE: CPT

## 2017-05-23 PROCEDURE — 40000502 ZZHCL STATISTIC GLUCOSE ED POCT

## 2017-05-23 PROCEDURE — 82565 ASSAY OF CREATININE: CPT

## 2017-05-23 PROCEDURE — 96366 THER/PROPH/DIAG IV INF ADDON: CPT | Performed by: EMERGENCY MEDICINE

## 2017-05-23 PROCEDURE — 93010 ELECTROCARDIOGRAM REPORT: CPT | Mod: Z6 | Performed by: EMERGENCY MEDICINE

## 2017-05-23 PROCEDURE — 82330 ASSAY OF CALCIUM: CPT

## 2017-05-23 PROCEDURE — 40000501 ZZHCL STATISTIC HEMATOCRIT ED POCT

## 2017-05-23 PROCEDURE — 40000498 ZZHCL STATISTIC POTASSIUM ED POCT

## 2017-05-23 PROCEDURE — 93005 ELECTROCARDIOGRAM TRACING: CPT | Performed by: EMERGENCY MEDICINE

## 2017-05-23 PROCEDURE — 85610 PROTHROMBIN TIME: CPT | Performed by: EMERGENCY MEDICINE

## 2017-05-23 PROCEDURE — 85025 COMPLETE CBC W/AUTO DIFF WBC: CPT | Performed by: EMERGENCY MEDICINE

## 2017-05-23 PROCEDURE — 81001 URINALYSIS AUTO W/SCOPE: CPT | Performed by: FAMILY MEDICINE

## 2017-05-23 RX ORDER — AMLODIPINE BESYLATE 5 MG/1
5 TABLET ORAL ONCE
Status: COMPLETED | OUTPATIENT
Start: 2017-05-23 | End: 2017-05-23

## 2017-05-23 RX ORDER — NITROGLYCERIN 20 MG/100ML
.07-2 INJECTION INTRAVENOUS CONTINUOUS
Status: DISCONTINUED | OUTPATIENT
Start: 2017-05-23 | End: 2017-05-23

## 2017-05-23 RX ORDER — NITROGLYCERIN 0.4 MG/1
0.4 TABLET SUBLINGUAL ONCE
Status: COMPLETED | OUTPATIENT
Start: 2017-05-23 | End: 2017-05-23

## 2017-05-23 RX ORDER — SODIUM CHLORIDE 9 MG/ML
INJECTION, SOLUTION INTRAVENOUS
Status: DISCONTINUED
Start: 2017-05-23 | End: 2017-05-23 | Stop reason: HOSPADM

## 2017-05-23 RX ORDER — IOPAMIDOL 755 MG/ML
75 INJECTION, SOLUTION INTRAVASCULAR ONCE
Status: DISCONTINUED | OUTPATIENT
Start: 2017-05-23 | End: 2017-05-23

## 2017-05-23 RX ADMIN — NITROGLYCERIN 0.4 MG: 0.4 TABLET SUBLINGUAL at 19:51

## 2017-05-23 RX ADMIN — AMLODIPINE BESYLATE 5 MG: 5 TABLET ORAL at 20:51

## 2017-05-23 RX ADMIN — NITROGLYCERIN 0.07 MCG/KG/MIN: 20 INJECTION INTRAVENOUS at 20:46

## 2017-05-23 NOTE — ED PROVIDER NOTES
"  History     Chief Complaint   Patient presents with     Altered Mental Status     The history is provided by the EMS personnel and the patient. The history is limited by the condition of the patient (altered mental status).     Xochilt Trujillo is a 89 year old female with a history of abdominal aneurysm, ASHD, CVA, cystocele, erosive gastritis, hypertension, MI, ovarian cancer s/p hysterectomy, and diabetes who presents via EMS for evaluation of altered mental status. Per EMS, patient's family noted the patient was \"not acting right\" around 2 PM this afternoon, and subsequently developed progressively worsening mental status, with \"a significant shift in her mentation\" around 4 PM. Patient's family called EMS on behalf of the patient. EMS states the patient had complained of nausea, lightheadedness, and a headache en route. She was given Zofran by EMS. She denied vomiting or chest pain to EMS. EMS reports the patient is typically alert and attentive at baseline. Blood pressures en route were 190s-200s systolic and her blood glucose was in the 150s. EMS did not note any weakness or loss of sensation, and her pupils were equal and reactive for them. EMS reports patient's family did not note any seizure type movement, and patient has no history of seizures. EMS did call a stroke code in the field and the stroke team was at the bedside when the patient arrived.     Here in the emergency department, the patient is not able to state the time, day, or month, and is not able to name specific objects. However, patient is able to follow commands. She does complain of chest discomfort and shortness of breath, and states her nausea has resolved.     Further history from the family regarding the patient's high blood pressure reveals the patient was taken off blood pressure medications 2 years ago because her blood pressure started becoming low and treatment was deemed not needed anymore.    I have reviewed the Medications, " Allergies, Past Medical and Surgical History, and Social History in the [facility] system.  Past Medical History:   Diagnosis Date     Abdominal aneurysm (H)     3-5 cm     ASHD (arteriosclerotic heart disease)      CVA (cerebral infarction) 1999     Cystocele      Erosive gastritis      Hypertension      MI (myocardial infarction) (H) 1999     Osteoporosis      Other and unspecified hyperlipidemia      Ovarian cancer (H)      PUD (peptic ulcer disease)      Skin cancer 07/2010    SCCIS of the L nasal bridge     Type 2 diabetes, HbA1C goal < 8% (H) 8/6/2012       Past Surgical History:   Procedure Laterality Date     CATARACT IOL, RT/LT       COLONOSCOPY       D & C       EXTRACAPSULAR CATARACT EXTRATION WITH INTRAOCULAR LENS IMPLANT  1-03/9-04    left/right     HC ESOPHAGOSCOPY, DIAGNOSTIC       HYSTERECTOMY, CERVIX STATUS UNKNOWN      For ovarian cancer       No family history on file.    Social History   Substance Use Topics     Smoking status: Never Smoker     Smokeless tobacco: Never Used     Alcohol use No     Patient's Medications   New Prescriptions    No medications on file   Previous Medications    AMOXICILLIN (AMOXIL) 500 MG CAPSULE    Take 1 capsule (500 mg) by mouth 2 times daily    ASPIRIN 81 MG EC TABLET    Take 1 tablet (81 mg) by mouth daily    BLOOD GLUCOSE TEST STRIP    One touch Ultra test strips testing once daily    CIPROFLOXACIN (CIPRO) 500 MG TABLET    Take 1 tablet (500 mg) by mouth 2 times daily    LORAZEPAM (ATIVAN) 0.5 MG TABLET    One half at night and one when spasms occur.    NEOMYCIN-POLYMYXIN-DEXAMETHASONE (MAXITROL) 3.5-47936-8.1 OINT OPHTHALMIC OINTMENT    Place 1 Application Into the left eye 2 times daily    ONE TOUCH DELICA LANCETS MISC    Blood sugar check once daily    ORDER FOR DME    Equipment being ordered: walker with wheels and seat    ORDER FOR DME    Equipment being ordered: First alert notification system.    OXYCODONE-ACETAMINOPHEN (PERCOCET) 5-325 MG PER TABLET    Take  0.5-1 tablets by mouth every 4 hours as needed for pain   Modified Medications    No medications on file   Discontinued Medications    No medications on file        Allergies   Allergen Reactions     Levaquin [Levofloxacin] Other (See Comments)     Ankle pain     Sulfa Drugs Unknown       Review of Systems   Unable to perform ROS: Mental status change       Physical Exam    BP (!) 168/93  Pulse 90  Temp 98.3  F (36.8  C) (Oral)  Resp 18  Wt 50.3 kg (111 lb)  SpO2 97%  BMI 20.97 kg/m2     Physical Exam   Constitutional:   Confused and hard of hearing   HENT:   Head: Atraumatic.   Eyes: Pupils are equal, round, and reactive to light.   Neck: Neck supple. No JVD present.   Cardiovascular: Normal heart sounds.    Pulmonary/Chest: Breath sounds normal. She has no wheezes. She has no rales.   Abdominal: Soft. There is no tenderness.   Musculoskeletal: She exhibits no edema or tenderness.   Neurological: She is alert.   Oriented ×1  Strength grossly intact bilaterally.   Skin: Skin is warm. She is not diaphoretic.   Psychiatric:   Unable to assess       ED Course     ED Course     Procedures       6:35 PM  The patient was seen and examined by Dr. Atwood in Room 2.   Stroke team was present on arrival.  Patient moved over to the stabilization room cart and was placed on cardiac monitors and oximetry.  IVs had already been established ×2 in route to the hospital.  CT have been notified and after initial evaluation by me and neurology the patient was taken to CT for scanning.    Results for orders placed or performed during the hospital encounter of 05/23/17   Chest  XR, 1 view portable    Narrative    Preliminary     Impression    Impression:  Patchy bibasilar opacities. Findings may represent atelectasis,  infection or be a sequela of aspiration.    Full report to follow.   CT Head w/o Contrast    Narrative    CT HEAD W/O CONTRAST 5/23/2017 6:58 PM    History: Evaluate for dissection/thromboembolism    Comparison:  Head CT 3/6/2017, brain MRI 11/11/2016.    Technique: Using multidetector thin collimation helical acquisition  technique, axial, coronal and sagittal CT images from the skull base  to the vertex were obtained without intravenous contrast.     Findings:    No intracranial hemorrhage. Gray-white matter differentiation is  intact. 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  Ventricles are not enlarged out of proportion to the cerebral sulci an  unchanged in size since 3/6/2017. No mass effect, midline shift or  abnormal extra axial fluid collection.     Bilateral pseudophakia. The visualized paranasal sinuses are clear.  The mastoid air cells are clear. Calvarium is intact.       Impression    Impression: No significant change since 3/6/2017. No acute  intracranial pathology.    MARCO EWING MD   MR Brain for Stroke Limited    Narrative    MR BRAIN FOR STROKE LIMITED 5/23/2017 8:23 PM    Provided History: confusion, headache    Comparison:  Head CT 5/23/2017. Brain MRI 11/11/2016     Technique: Multiphasic multisequence MRI of the brain using the quick  stroke protocol. No intravenous contrast was administered.    Findings:   No abnormal foci restricted diffusion to suggest acute infarct.    Stable moderate leukoaraiosis and generalized cerebral/cerebellar  parenchymal volume loss. Ventricles are not enlarged out of proportion  to the cerebral sulci. No mass effect, midline shift or abnormal extra  axial fluid collection. Patent major intracranial vascular flow voids.    Paranasal sinuses and mastoid air cells are clear. Bilateral  pseudophakia.      Impression    Impression: No significant change since 11/11/2016. No acute infarct.  Stable moderate leukoaraiosis and generalized parenchymal volume loss.    I have personally reviewed the examination and initial interpretation  and I agree with the findings.    MARCO EWING MD   CBC with  platelets differential   Result Value Ref Range    WBC 9.0 4.0 - 11.0 10e9/L    RBC Count 4.45 3.8 - 5.2 10e12/L    Hemoglobin 12.2 11.7 - 15.7 g/dL    Hematocrit 37.9 35.0 - 47.0 %    MCV 85 78 - 100 fl    MCH 27.4 26.5 - 33.0 pg    MCHC 32.2 31.5 - 36.5 g/dL    RDW 15.3 (H) 10.0 - 15.0 %    Platelet Count 264 150 - 450 10e9/L    Diff Method Automated Method     % Neutrophils 66.8 %    % Lymphocytes 24.3 %    % Monocytes 6.5 %    % Eosinophils 1.7 %    % Basophils 0.4 %    % Immature Granulocytes 0.3 %    Nucleated RBCs 0 0 /100    Absolute Neutrophil 6.0 1.6 - 8.3 10e9/L    Absolute Lymphocytes 2.2 0.8 - 5.3 10e9/L    Absolute Monocytes 0.6 0.0 - 1.3 10e9/L    Absolute Eosinophils 0.2 0.0 - 0.7 10e9/L    Absolute Basophils 0.0 0.0 - 0.2 10e9/L    Abs Immature Granulocytes 0.0 0 - 0.4 10e9/L    Absolute Nucleated RBC 0.0    Comprehensive metabolic panel   Result Value Ref Range    Sodium 141 133 - 144 mmol/L    Potassium 4.0 3.4 - 5.3 mmol/L    Chloride 108 94 - 109 mmol/L    Carbon Dioxide 24 20 - 32 mmol/L    Anion Gap 9 3 - 14 mmol/L    Glucose 128 (H) 70 - 99 mg/dL    Urea Nitrogen 28 7 - 30 mg/dL    Creatinine 1.38 (H) 0.52 - 1.04 mg/dL    GFR Estimate 36 (L) >60 mL/min/1.7m2    GFR Estimate If Black 43 (L) >60 mL/min/1.7m2    Calcium 9.1 8.5 - 10.1 mg/dL    Bilirubin Total 0.3 0.2 - 1.3 mg/dL    Albumin 3.3 (L) 3.4 - 5.0 g/dL    Protein Total 7.6 6.8 - 8.8 g/dL    Alkaline Phosphatase 83 40 - 150 U/L    ALT 23 0 - 50 U/L    AST 19 0 - 45 U/L   Troponin I   Result Value Ref Range    Troponin I ES  0.000 - 0.045 ug/L     <0.015  The 99th percentile for upper reference range is 0.045 ug/L.  Troponin values in   the range of 0.045 - 0.120 ug/L may be associated with risks of adverse   clinical events.     INR   Result Value Ref Range    INR 0.96 0.86 - 1.14   INR point of care   Result Value Ref Range    INR Point of Care 0.9 0.86 - 1.14   Creatinine POCT   Result Value Ref Range    Creatinine 1.5 (H) 0.52 - 1.04  mg/dL    GFR Estimate 32 (L) >60 mL/min/1.7m2    GFR Estimate If Black 39 (L) >60 mL/min/1.7m2   Routine UA with microscopic   Result Value Ref Range    Color Urine Straw     Appearance Urine Clear     Glucose Urine Negative NEG mg/dL    Bilirubin Urine Negative NEG    Ketones Urine Negative NEG mg/dL    Specific Gravity Urine 1.006 1.003 - 1.035    Blood Urine Negative NEG    pH Urine 7.0 5.0 - 7.0 pH    Protein Albumin Urine Negative NEG mg/dL    Urobilinogen mg/dL Normal 0.0 - 2.0 mg/dL    Nitrite Urine Negative NEG    Leukocyte Esterase Urine Moderate (A) NEG    Source Midstream Urine     WBC Urine 4 (H) 0 - 2 /HPF    RBC Urine 0 0 - 2 /HPF    Squamous Epithelial /HPF Urine 1 0 - 1 /HPF    Transitional Epi <1 0 - 1 /HPF   Nt probnp inpatient   Result Value Ref Range    N-Terminal Pro BNP Inpatient 331 0 - 1800 pg/mL   EKG 12-lead, tracing only   Result Value Ref Range    Interpretation ECG Click View Image link to view waveform and result    ISTAT gases elec ica gluc nereyda POCT   Result Value Ref Range    Ph Venous 7.43 7.32 - 7.43 pH    PCO2 Venous 39 (L) 40 - 50 mm Hg    PO2 Venous 19 (L) 25 - 47 mm Hg    Bicarbonate Venous 26 21 - 28 mmol/L    O2 Sat Venous 31 %    Sodium 142 133 - 144 mmol/L    Potassium 4.2 3.4 - 5.3 mmol/L    Glucose 129 (H) 70 - 99 mg/dL    Calcium Ionized 4.8 4.4 - 5.2 mg/dL    Hemoglobin 12.2 11.7 - 15.7 g/dL    Hematocrit - POCT 36 35.0 - 47.0 %PCV   Troponin POCT   Result Value Ref Range    Troponin I 0.00 0.00 - 0.10 ug/L     Portable chest x-ray was done after CT scanning and revealed some possible bilateral fluffiness consistent with early pulmonary edema.    EKG was done and revealed a normal sinus rhythm at a rate of 84 with a purulent 0.144 and a QRS duration 0.078.  The patient had normal axis with no acute ST or T-wave changes noted for ischemia.  This is read by me personally.    Upon return from CT the patient with a blood pressure around 168 and started to clear her  mentation.  Neurology felt this is most likely not a CVA but no CTA was done because of the patient's elevated creatinine.  Brain MRI was ordered to rule out CVA.  This was negative.    Patient's blood pressure did start back up again necessitating sublingual nitro followed by a nitro drip followed by Norvasc.    Medications   sodium chloride 0.9 % for CT scan flush dose 90 mL (not administered)   nitroglycerin 50 mg in D5W 250 mL (adult std) infusion (0.07 mcg/kg/min × 50.3 kg Intravenous New Bag 5/23/17 2046)   NaCl 0.9 % infusion (not administered)   amLODIPine (NORVASC) tablet 5 mg (5 mg Oral Given 5/23/17 2051)   nitroglycerin (NITROSTAT) sublingual tablet 0.4 mg (0.4 mg Sublingual Given 5/23/17 1951)       Critical Care time:  was greater than 60 minutes for this patient excluding procedures.    The patient has stroke symptoms:           ED Stroke specific documentation           NIHSS PDF          Protocol PDF     Patient last known well time: 2PM  ED Provider first to bedside at: arrival       Labs Ordered and Resulted from Time of ED Arrival Up to the Time of Departure from the ED   CBC WITH PLATELETS DIFFERENTIAL - Abnormal; Notable for the following:        Result Value    RDW 15.3 (*)     All other components within normal limits   COMPREHENSIVE METABOLIC PANEL - Abnormal; Notable for the following:     Glucose 128 (*)     Creatinine 1.38 (*)     GFR Estimate 36 (*)     GFR Estimate If Black 43 (*)     Albumin 3.3 (*)     All other components within normal limits   CREATININE POCT - Abnormal; Notable for the following:     Creatinine 1.5 (*)     GFR Estimate 32 (*)     GFR Estimate If Black 39 (*)     All other components within normal limits   ROUTINE UA WITH MICROSCOPIC - Abnormal; Notable for the following:     Leukocyte Esterase Urine Moderate (*)     WBC Urine 4 (*)     All other components within normal limits   ISTAT GASES ELEC ICA GLUC YASMEEN POCT - Abnormal; Notable for the following:     PCO2  Venous 39 (*)     PO2 Venous 19 (*)     Glucose 129 (*)     All other components within normal limits   TROPONIN I   INR   INR POINT OF CARE   NT PROBNP INPATIENT   NT PROBNP INPATIENT   VITAL SIGNS AND NEURO CHECKS   PULSE OXIMETRY NURSING   NOTIFY   ISTAT INR NURSING POCT   ISTAT TROPONIN NURSING POCT   ISTAT CREATININE NURSING POCT   ACTIVITY   ASSESSMENT   TROPONIN POCT       Assessments & Plan (with Medical Decision Making)     I have reviewed the nursing notes.    Neurology recommended medicine admission for the patient's blood pressure control and postulated possible hypertensive encephalopathy.  At this time the patient is on a nitroglycerin titration which has stabilized her blood pressure.  Patient will be admitted to the medicine service for further neurologic observation and blood pressure control.  Patient will more than likely need long-term blood pressure control and was started on Norvasc in the ER.    I have reviewed the findings, diagnosis, and plan with the patient and family members.    Final diagnoses:   Hypertensive urgency - with confusion     MD ERNIE Lamb Adrienne C Burgin, am serving as a trained medical scribe to document services personally performed by Jordan Atwodo MD, based on the provider's statements to me.   IJordan MD, was physically present and have reviewed and verified the accuracy of this note documented by Angélica Greer.        5/23/2017   [facility], [place], EMERGENCY DEPARTMENT     Jordan Atwood MD  05/23/17 [id number]

## 2017-05-23 NOTE — TELEPHONE ENCOUNTER
Call Type: Triage Call    Presenting Problem: Bestpavan AuraXochilt's daughter called about  symptoms Xochilt is having.  She is very weak, getting weaker. She is  shaking. She isn't able to speak well, and is more confused than  usual. UA done today shows moderate bacteria. Because of severity  and worsening of symptoms, per Relay guideline, I instructed 911.  Taz stated understanding and agreement.  Germania PUGA RN [place]  [facility]  Triage Note:  Guideline Title: Neurological Deficits  Recommended Disposition: Activate   Original Inclination: Wanted to speak with a nurse  Override Disposition:  Intended Action: Call 911  Physician Contacted: No  New or worsening signs and symptoms that may indicate shock ?  YES  Unconscious now or within last 6 hours ? NO  Physician Instructions:  Care Advice:

## 2017-05-23 NOTE — PROGRESS NOTES
Stroke code called at 1821 due to field activation. Patient arrived at ED at 1834. Neuro assessment completed to Neuro MD and primary RN. Patient to CT with MD and RN x2 at 1843. Patient returned from CT at 1850.

## 2017-05-23 NOTE — ED NOTES
Family of patient noted that she was slightly confused today at 2PM, this confused worsened around 4PM. Patient transported here by EMS.  during transport. On arrival patient complaining of lightheadedness and headache.

## 2017-05-23 NOTE — CONSULTS
[facility], [place]      Neurology Stroke Consult    Patient Name: Xochilt Trujillo  : 1924 MRN#: [id number]    STROKE DATA    Stroke Code:  Time called:  2017 18:21  Time patient seen:  2017 18:32 (patient not yet arrived in ED)  Onset of symptoms:  2017 14:00  Last known normal (pt's baseline):  2017 unknown  Head CT read by Dr. Freeman at:  2017 18:45    TPA treatment:  Not given due to outside the time window.     National Institutes of Health Stroke Scale (at presentation)  NIHSS done at:  time patient seen      Score    Level of consciousness:  (0)   Alert, keenly responsive     LOC questions:  (1)   Answers one question correctly    LOC commands:  (0)   Performs both tasks correctly    Best gaze:  (0)   Normal    Visual:  (0)   No visual loss    Facial palsy:  (0)   Normal symmetrical movements    Motor arm (left):  (0)   No drift    Motor arm (right):  (0)   No drift    Motor leg (left):  (0)   No drift    Motor leg (right):  (0)   No drift    Limb ataxia:  (0)   Absent    Sensory:  (0)   Normal- no sensory loss    Best language:  (1)   Mild to moderate aphasia    Dysarthria:  (0)   Normal    Extinction and inattention:  (0)   No abnormality        NIHSS Total Score:  2        Dysphagia Screen  Time of screenin2017 19:00  Screening results: Passed screening, no dysarthria - Regular Diet with thin liquids     ASSESSMENT & RECOMMENDATIONS     The patient was seen for confusion and speech difficulties. Based on the examination, there are no findings consistent with a large vessel occlusion. Patient is outside of window for tPA regardless and NIH awouldn't be high enough for thrombectomy if this were an LVO. No reports of convulsions or history of seizures to make me think this is post-ictal state. She is hypertensive so this could represent hypertensive encephalopathy and thus BP should be controlled. As elderly patients with infections  can often become encephalopathic, we recommend an infectious workup. She also complained on chest discomfort so an MI workup is not unreasonable.    Recommendations:  - CT w/o contrast has no evidence for bleed  - MRI Brain Stroke protocol since CT negative  - optimize BP  - fall precautions    HPI  Xochilt Trujillo is a 89 year old female presenting with confusion. She was with her family and aroudn 2pm started acting abnormally. This worsened acutely at 4pm and EMS was called. She was hypertensive to systolic 200's on their arrival. They did not witness any focal deficits or abnormal movements concerning for seizure.   In ED, Glucose ~110, Troponin < 0.015. LFTs normal. No leukocytosis. UA shows no UTI.    Pertinent Past Medical/Surgical History  Past Medical History:   Diagnosis Date     Abdominal aneurysm (H)     3-5 cm     ASHD (arteriosclerotic heart disease)      CVA (cerebral infarction) 1999     Cystocele      Erosive gastritis      Hypertension      MI (myocardial infarction) (H) 1999     Osteoporosis      Other and unspecified hyperlipidemia      Ovarian cancer (H)      PUD (peptic ulcer disease)      Skin cancer 07/2010    SCCIS of the L nasal bridge     Type 2 diabetes, HbA1C goal < 8% (H) 8/6/2012       Past Surgical History:   Procedure Laterality Date     CATARACT IOL, RT/LT       COLONOSCOPY       D & C       EXTRACAPSULAR CATARACT EXTRATION WITH INTRAOCULAR LENS IMPLANT  1-03/9-04    left/right     HC ESOPHAGOSCOPY, DIAGNOSTIC       HYSTERECTOMY, CERVIX STATUS UNKNOWN      For ovarian cancer     Medications:   Current Facility-Administered Medications   Medication     0.9% sodium chloride BOLUS     iopamidol (ISOVUE-370) solution 75 mL     sodium chloride 0.9 % for CT scan flush dose 90 mL     Current Outpatient Prescriptions   Medication Sig     ciprofloxacin (CIPRO) 500 MG tablet Take 1 tablet (500 mg) by mouth 2 times daily     neomycin-polymyxin-dexamethasone (MAXITROL) 3.5-04516-4.1 OINT  ophthalmic ointment Place 1 Application Into the left eye 2 times daily     amoxicillin (AMOXIL) 500 MG capsule Take 1 capsule (500 mg) by mouth 2 times daily     LORazepam (ATIVAN) 0.5 MG tablet One half at night and one when spasms occur.     oxyCODONE-acetaminophen (PERCOCET) 5-325 MG per tablet Take 0.5-1 tablets by mouth every 4 hours as needed for pain     aspirin 81 MG EC tablet Take 1 tablet (81 mg) by mouth daily     order for DME Equipment being ordered: First alert notification system.     blood glucose test strip One touch Ultra test strips testing once daily     ORDER FOR DME Equipment being ordered: walker with wheels and seat     ONE TOUCH DELICA LANCETS MISC Blood sugar check once daily     Allergies:   Allergies   Allergen Reactions     Levaquin [Levofloxacin] Other (See Comments)     Ankle pain     Sulfa Drugs Unknown     Family History: No family history on file..    Social History:   Social History   Substance Use Topics     Smoking status: Never Smoker     Smokeless tobacco: Never Used     Alcohol use No   Tobacco use: Never    ROS:  The 10 point Review of Systems is negative other than noted in the HPI or here.    PHYSICAL EXAMINATION:  Vital Signs:  B/P: 168/86,  T: 98.3,  P: 92,  R: 12  General:  patient lying in bed without any acute distress    HEENT:  normocephalic/atraumatic  Cardio:  RRR  Pulmonary:  no respiratory distress  Abdomen:  soft, non-tender, non-distended  Extremities:  no edema  Skin:  intact     Neurologic:  Mental Status:  fully alert, attentive and oriented, speech clear and fluent, follows commands intermittently  Cranial Nerves:  visual fields intact, PERRL, EOMI with normal smooth pursuit, facial sensation intact and symmetric, facial movements symmetric, hearing not formally tested but intact to conversation, palate elevation symmetric and uvula midline, no dysarthria, shoulder shrug strong bilaterally, tongue protrusion midline  Motor:  no abnormal movements, normal  tone throughout, normal muscle bulk, no pronator drift, normal and symmetric rapid finger tapping, able to move all limbs spontaneously, strength 5/5 throughout upper and lower extremities  Reflexes:  2+ and symmetric throughout, no clonus, toes downgoing  Sensory:  intact/symmetric to light touch and pin prick throughout upper and lower extremities  Coordination:  FNF and HS intact without dysmetria  Station/Gait:  deferred    Labs  Labs and Imaging reviewed and used in developing the plan; pertinent results included.     Lab Results   Component Value Date     (H) 04/07/2017     The patient was discussed with the Fellow, Dr. Freeman.  The staff is Dr. Salcedo.    Meet Nur

## 2017-05-23 NOTE — IP AVS SNAPSHOT
[address] [address]    Phone:  [phone]                                       After Visit Summary   5/23/2017    Xochilt Trujillo    MRN: [id number]           After Visit Summary Signature Page     I have received my discharge instructions, and my questions have been answered. I have discussed any challenges I see with this plan with the nurse or doctor.    ..........................................................................................................................................  Patient/Patient Representative Signature      ..........................................................................................................................................  Patient Representative Print Name and Relationship to Patient    ..................................................               ................................................  Date                                            Time    ..........................................................................................................................................  Reviewed by Signature/Title    ...................................................              ..............................................  Date                                                            Time

## 2017-05-23 NOTE — IP AVS SNAPSHOT
MRN:[id number]                      After Visit Summary   5/23/2017    Xochilt Trujillo    MRN: [id number]           Thank you!     Thank you for choosing [facility] for your care. Our goal is always to provide you with excellent care. Hearing back from our patients is one way we can continue to improve our services. Please take a few minutes to complete the written survey that you may receive in the mail after you visit with us. Thank you!        Patient Information     Date Of Birth          5/28/1924        Designated Caregiver       Most Recent Value    Caregiver    Will someone help with your care after discharge? yes    Name of designated caregiver unsure    Phone number of caregiver unsure    Caregiver address unsure      About your hospital stay     You were admitted on:  May 24, 2017 You last received care in the:  [address] [facility]    You were discharged on:  May 26, 2017        Reason for your hospital stay       Xochilt Trujillo was hospitalized with confusion and high blood pressure. Her confusion could be due to medication side effects (Ativan and opioids like oxycodone).                  Who to Call     For medical emergencies, please call 911.  For non-urgent questions about your medical care, please call your primary care provider or clinic, [phone]          Attending Provider     Provider Specialty    Jordan Atwood MD Emergency Medicine    [facility], Nola Alfredo MD Internal Medicine       Primary Care Provider Office Phone # Fax #    Fran Haile [phone]       [address] [address]        After Care Instructions     Activity       Your activity upon discharge: activity as tolerated            Diet       Follow this diet upon discharge:      Regular Diet Adult            Discharge Instructions       1. Follow up with primary care provider, Fran Haile, within 1-2 weeks, to evaluate  medication change and for hospital follow-up. Discuss alternative options for hand tremors. You may also follow-up with Neurology to discuss alternative options for hand tremors.  2. Stop taking Ativan or any opioid pain medications (oxycodone, Percocet, Norco, etc.).  3. Start taking amlodipine 5 mg daily.                  Follow-up Appointments     Adult [facility] [[email or web address] Follow-up and recommended labs and tests       Follow up with primary care provider, Fran Haile, within 1-2 weeks, to evaluate medication change and for hospital follow- up. No follow up labs or test are needed.     Appointments on [place] and/or [facility] (with [facility] [[email or web address]] or [facility] provider or service). Call [phone] if you haven't heard regarding these appointments within 7 days of discharge.                  Additional Services     Home Care OT Referral for Hospital Discharge       [facility]  Ph: [phone]  Fax: [phone]    OT to eval and treat    Your provider has ordered home care - occupational therapy. If you have not been contacted within 2 days of your discharge please call the department phone number listed on the top of this document.            Home Care PT Referral for Hospital Discharge       [facility]  Ph: [phone]  Fax: [phone]    PT to eval and treat    Your provider has ordered home care - physical therapy. If you have not been contacted within 2 days of your discharge please call the department phone number listed on the top of this document.            Home care nursing referral       [facility]  Ph: [phone]  Fax: [phone]    RN skilled nursing visit. RN to assess vital signs and weight, respiratory and cardiac status, pain level and activity tolerance, hydration, nutrition and bowel status and home safety.  RN to teach medication management.  Home health aide to assist with ADL'S    Your provider has ordered home care nursing services. If you have not been  contacted within 2 days of your discharge please call the inpatient department phone number at [phone] .                  Further instructions from your care team       You will be receiving a call from your primary care clinic to confirm the time and date of your follow up appointment. This appointment should be within 7 days of discharge. If you do not hear from them within 2 business days, please call them at [phone].        Discharge RN: please fax completed orders to  [facility]  Ph: [phone]  Fax: [phone]      Pending Results     No orders found from 5/21/2017 to 5/24/2017.            Statement of Approval     Ordered          05/26/17 1245  I have reviewed and agree with all the recommendations and orders detailed in this document.  EFFECTIVE NOW     Approved and electronically signed by:  Karen Martinez MD             Admission Information     Date & Time Department Dept. Phone    5/23/2017 [address] [facility] [phone]      Your Vitals Were     Blood Pressure Pulse Temperature Respirations Weight Pulse Oximetry    145/75 98 97.5  F (36.4  C) (Oral) 18 44.2 kg (97 lb 6.4 oz) 97%    BMI (Body Mass Index)                   18.4 kg/m2           MyChart Information     [employer] gives you secure access to your electronic health record. If you see a primary care provider, you can also send messages to your care team and make appointments. If you have questions, please call your primary care clinic.  If you do not have a primary care provider, please call [phone] and they will assist you.        Care EveryWhere ID     This is your Care EveryWhere ID. This could be used by other organizations to access your [facility] medical records  [id number]           Review of your medicines      START taking        Dose / Directions    amLODIPine 5 MG tablet   Commonly known as:  NORVASC   Used for:  Hypertensive urgency        Dose:  5 mg   Take 1 tablet (5 mg) by mouth daily    Quantity:  30 tablet   Refills:  1         CONTINUE these medicines which have NOT CHANGED        Dose / Directions    aspirin 81 MG EC tablet   Used for:  Type 2 diabetes mellitus without complication, without long-term current use of insulin (H)        Dose:  81 mg   Take 1 tablet (81 mg) by mouth daily   Quantity:  90 tablet   Refills:  3       blood glucose monitoring test strip   Commonly known as:  no brand specified   Used for:  Type 2 diabetes, HbA1C goal < 8% (H)        One touch Ultra test strips testing once daily   Quantity:  3 Box   Refills:  1       ONE TOUCH DELICA LANCETS Misc   Used for:  Type 2 diabetes, HbA1C goal < 8% (H)        Blood sugar check once daily   Quantity:  30 each   Refills:  5       * order for DME   Used for:  Unsteady gait        Equipment being ordered: walker with wheels and seat   Quantity:  1 Device   Refills:  0       * order for DME   Used for:  Routine general medical examination at a health care facility, CKD (chronic kidney disease) stage 4, GFR 15-29 ml/min (H), Type 2 diabetes mellitus without complication, without long-term current use of insulin (H), Abdominal aortic aneurysm without rupture (H)        Equipment being ordered: First alert notification system.   Quantity:  1 Device   Refills:  0       * Notice:  This list has 2 medication(s) that are the same as other medications prescribed for you. Read the directions carefully, and ask your doctor or other care provider to review them with you.      STOP taking     LORazepam 0.5 MG tablet   Commonly known as:  ATIVAN                Where to get your medicines      These medications were sent to [facility] [address]  [address] [facility] [address] [address]     Phone:  [phone]     amLODIPine 5 MG tablet                Protect others around you: Learn how to safely use, store and throw away your medicines at www.disposemymeds.org.             Medication List: This is  a list of all your medications and when to take them. Check marks below indicate your daily home schedule. Keep this list as a reference.      Medications           Morning Afternoon Evening Bedtime As Needed    amLODIPine 5 MG tablet   Commonly known as:  NORVASC   Take 1 tablet (5 mg) by mouth daily   Last time this was given:  5 mg on 5/26/2017  9:17 AM                                   aspirin 81 MG EC tablet   Take 1 tablet (81 mg) by mouth daily   Last time this was given:  81 mg on 5/26/2017  9:17 AM                                   blood glucose monitoring test strip   Commonly known as:  no brand specified   One touch Ultra test strips testing once daily                                ONE TOUCH DELICA LANCETS Misc   Blood sugar check once daily                                * order for DME   Equipment being ordered: walker with wheels and seat                                * order for DME   Equipment being ordered: First alert notification system.                                * Notice:  This list has 2 medication(s) that are the same as other medications prescribed for you. Read the directions carefully, and ask your doctor or other care provider to review them with you.

## 2017-05-23 NOTE — TELEPHONE ENCOUNTER
Taz Chavarria, Xochilt's daughter called about symptoms Xochilt is having.  She is very weak, getting weaker. She is shaking. She isn't able to speak well, and is more confused than usual. UA done today shows moderate bacteria. Because of severity and worsening of symptoms, per Relay guideline, I instructed 911.  Taz stated understanding and agreement.  Germania PUGA RN [facility]

## 2017-05-23 NOTE — LETTER
[address]  [address]  [phone]      May 31, 2017      Xochilt ERNIE Ronnie  [address] [place] [address]              Dear MsRuth Trujillo,    The urine analysis was not normal but there was not a clear indication of infection. Please let me know if you develop new signs or symptoms of infection.     Please contact the clinic if you have additional questions.  Thank you.       Sincerely,    Fran Halie MD/EC CMA

## 2017-05-23 NOTE — PHARMACY
Pharmacy Stroke Code Response  Pharmacist responded as part of the Stroke Code Team activation to patient care area ED.  The Stroke Team determined that the patient was not a candidate for IV alteplase therapy and the pharmacy team was dismissed at 1836.     Demetrius DouglasD, BCPS  May 23, 2017

## 2017-05-24 PROBLEM — I16.0 HYPERTENSIVE URGENCY: Status: ACTIVE | Noted: 2017-05-24

## 2017-05-24 LAB
ALBUMIN UR-MCNC: NEGATIVE MG/DL
AMMONIA PLAS-SCNC: <10 UMOL/L (ref 10–50)
ANION GAP SERPL CALCULATED.3IONS-SCNC: 8 MMOL/L (ref 3–14)
APPEARANCE UR: CLEAR
BILIRUB UR QL STRIP: NEGATIVE
BUN SERPL-MCNC: 23 MG/DL (ref 7–30)
CALCIUM SERPL-MCNC: 9.4 MG/DL (ref 8.5–10.1)
CHLORIDE SERPL-SCNC: 110 MMOL/L (ref 94–109)
CO2 SERPL-SCNC: 24 MMOL/L (ref 20–32)
COLOR UR AUTO: NORMAL
CREAT SERPL-MCNC: 1.29 MG/DL (ref 0.52–1.04)
ERYTHROCYTE [DISTWIDTH] IN BLOOD BY AUTOMATED COUNT: 15.5 % (ref 10–15)
GFR SERPL CREATININE-BSD FRML MDRD: 39 ML/MIN/1.7M2
GLUCOSE BLDC GLUCOMTR-MCNC: 130 MG/DL (ref 70–99)
GLUCOSE SERPL-MCNC: 129 MG/DL (ref 70–99)
GLUCOSE UR STRIP-MCNC: NEGATIVE MG/DL
HCT VFR BLD AUTO: 38.8 % (ref 35–47)
HGB BLD-MCNC: 12.7 G/DL (ref 11.7–15.7)
HGB UR QL STRIP: NEGATIVE
INTERPRETATION ECG - MUSE: NORMAL
KETONES UR STRIP-MCNC: NEGATIVE MG/DL
LACTATE BLD-SCNC: 0.7 MMOL/L (ref 0.7–2.1)
LEUKOCYTE ESTERASE UR QL STRIP: NEGATIVE
MAGNESIUM SERPL-MCNC: 2.3 MG/DL (ref 1.6–2.3)
MCH RBC QN AUTO: 27.7 PG (ref 26.5–33)
MCHC RBC AUTO-ENTMCNC: 32.7 G/DL (ref 31.5–36.5)
MCV RBC AUTO: 85 FL (ref 78–100)
NITRATE UR QL: NEGATIVE
PH UR STRIP: 7 PH (ref 5–7)
PLATELET # BLD AUTO: 251 10E9/L (ref 150–450)
POTASSIUM SERPL-SCNC: 4.3 MMOL/L (ref 3.4–5.3)
PROCALCITONIN SERPL-MCNC: NORMAL NG/ML
RBC # BLD AUTO: 4.58 10E12/L (ref 3.8–5.2)
RBC #/AREA URNS AUTO: <1 /HPF (ref 0–2)
SODIUM SERPL-SCNC: 142 MMOL/L (ref 133–144)
SP GR UR STRIP: 1.01 (ref 1–1.03)
SQUAMOUS #/AREA URNS AUTO: <1 /HPF (ref 0–1)
URN SPEC COLLECT METH UR: NORMAL
UROBILINOGEN UR STRIP-MCNC: NORMAL MG/DL (ref 0–2)
WBC # BLD AUTO: 11.6 10E9/L (ref 4–11)
WBC #/AREA URNS AUTO: <1 /HPF (ref 0–2)

## 2017-05-24 PROCEDURE — 40000358 ZZHCL STATISTIC DRUG SCREEN MULTIPLE (METRO): Performed by: INTERNAL MEDICINE

## 2017-05-24 PROCEDURE — 80307 DRUG TEST PRSMV CHEM ANLYZR: CPT | Performed by: INTERNAL MEDICINE

## 2017-05-24 PROCEDURE — 25000128 H RX IP 250 OP 636: Performed by: PEDIATRICS

## 2017-05-24 PROCEDURE — 85027 COMPLETE CBC AUTOMATED: CPT | Performed by: INTERNAL MEDICINE

## 2017-05-24 PROCEDURE — 82140 ASSAY OF AMMONIA: CPT | Performed by: STUDENT IN AN ORGANIZED HEALTH CARE EDUCATION/TRAINING PROGRAM

## 2017-05-24 PROCEDURE — 99223 1ST HOSP IP/OBS HIGH 75: CPT | Mod: AI | Performed by: INTERNAL MEDICINE

## 2017-05-24 PROCEDURE — 36415 COLL VENOUS BLD VENIPUNCTURE: CPT | Performed by: STUDENT IN AN ORGANIZED HEALTH CARE EDUCATION/TRAINING PROGRAM

## 2017-05-24 PROCEDURE — 83735 ASSAY OF MAGNESIUM: CPT | Performed by: INTERNAL MEDICINE

## 2017-05-24 PROCEDURE — 00000146 ZZHCL STATISTIC GLUCOSE BY METER IP

## 2017-05-24 PROCEDURE — 80048 BASIC METABOLIC PNL TOTAL CA: CPT | Performed by: INTERNAL MEDICINE

## 2017-05-24 PROCEDURE — 25000128 H RX IP 250 OP 636: Performed by: INTERNAL MEDICINE

## 2017-05-24 PROCEDURE — 25000132 ZZH RX MED GY IP 250 OP 250 PS 637: Performed by: INTERNAL MEDICINE

## 2017-05-24 PROCEDURE — 12000006 ZZH R&B IMCU INTERMEDIATE UMMC

## 2017-05-24 PROCEDURE — 40000358 ZZHCL STATISTIC DRUG SCREEN MULTIPLE (METRO): Performed by: STUDENT IN AN ORGANIZED HEALTH CARE EDUCATION/TRAINING PROGRAM

## 2017-05-24 PROCEDURE — 84145 PROCALCITONIN (PCT): CPT | Performed by: INTERNAL MEDICINE

## 2017-05-24 PROCEDURE — 87086 URINE CULTURE/COLONY COUNT: CPT | Performed by: INTERNAL MEDICINE

## 2017-05-24 PROCEDURE — 81001 URINALYSIS AUTO W/SCOPE: CPT | Performed by: INTERNAL MEDICINE

## 2017-05-24 PROCEDURE — 36415 COLL VENOUS BLD VENIPUNCTURE: CPT | Performed by: INTERNAL MEDICINE

## 2017-05-24 PROCEDURE — 25000132 ZZH RX MED GY IP 250 OP 250 PS 637: Performed by: STUDENT IN AN ORGANIZED HEALTH CARE EDUCATION/TRAINING PROGRAM

## 2017-05-24 PROCEDURE — 83605 ASSAY OF LACTIC ACID: CPT | Performed by: STUDENT IN AN ORGANIZED HEALTH CARE EDUCATION/TRAINING PROGRAM

## 2017-05-24 RX ORDER — ONDANSETRON 2 MG/ML
4 INJECTION INTRAMUSCULAR; INTRAVENOUS EVERY 6 HOURS PRN
Status: DISCONTINUED | OUTPATIENT
Start: 2017-05-24 | End: 2017-05-26 | Stop reason: HOSPADM

## 2017-05-24 RX ORDER — LABETALOL HYDROCHLORIDE 5 MG/ML
20 INJECTION, SOLUTION INTRAVENOUS EVERY 6 HOURS PRN
Status: DISCONTINUED | OUTPATIENT
Start: 2017-05-24 | End: 2017-05-26 | Stop reason: HOSPADM

## 2017-05-24 RX ORDER — ONDANSETRON 4 MG/1
4 TABLET, ORALLY DISINTEGRATING ORAL EVERY 6 HOURS PRN
Status: DISCONTINUED | OUTPATIENT
Start: 2017-05-24 | End: 2017-05-26 | Stop reason: HOSPADM

## 2017-05-24 RX ORDER — ACETAMINOPHEN 325 MG/1
650 TABLET ORAL EVERY 4 HOURS PRN
Status: DISCONTINUED | OUTPATIENT
Start: 2017-05-24 | End: 2017-05-26 | Stop reason: HOSPADM

## 2017-05-24 RX ORDER — NALOXONE HYDROCHLORIDE 0.4 MG/ML
.1-.4 INJECTION, SOLUTION INTRAMUSCULAR; INTRAVENOUS; SUBCUTANEOUS
Status: DISCONTINUED | OUTPATIENT
Start: 2017-05-24 | End: 2017-05-26 | Stop reason: HOSPADM

## 2017-05-24 RX ORDER — LIDOCAINE 40 MG/G
CREAM TOPICAL
Status: DISCONTINUED | OUTPATIENT
Start: 2017-05-24 | End: 2017-05-26 | Stop reason: HOSPADM

## 2017-05-24 RX ORDER — AMLODIPINE BESYLATE 5 MG/1
5 TABLET ORAL DAILY
Status: DISCONTINUED | OUTPATIENT
Start: 2017-05-24 | End: 2017-05-26 | Stop reason: HOSPADM

## 2017-05-24 RX ORDER — ASPIRIN 81 MG/1
81 TABLET ORAL DAILY
Status: DISCONTINUED | OUTPATIENT
Start: 2017-05-24 | End: 2017-05-26 | Stop reason: HOSPADM

## 2017-05-24 RX ORDER — DEXTROSE, SODIUM CHLORIDE, SODIUM LACTATE, POTASSIUM CHLORIDE, AND CALCIUM CHLORIDE 5; .6; .31; .03; .02 G/100ML; G/100ML; G/100ML; G/100ML; G/100ML
INJECTION, SOLUTION INTRAVENOUS CONTINUOUS
Status: ACTIVE | OUTPATIENT
Start: 2017-05-24 | End: 2017-05-24

## 2017-05-24 RX ADMIN — ONDANSETRON 4 MG: 2 INJECTION INTRAMUSCULAR; INTRAVENOUS at 19:31

## 2017-05-24 RX ADMIN — SODIUM CHLORIDE, SODIUM LACTATE, POTASSIUM CHLORIDE, CALCIUM CHLORIDE AND DEXTROSE MONOHYDRATE: 5; 600; 310; 30; 20 INJECTION, SOLUTION INTRAVENOUS at 11:05

## 2017-05-24 RX ADMIN — AMLODIPINE BESYLATE 5 MG: 5 TABLET ORAL at 07:57

## 2017-05-24 ASSESSMENT — PAIN DESCRIPTION - DESCRIPTORS: DESCRIPTORS: ACHING

## 2017-05-24 NOTE — H&P
"  Internal Medicine History and Physical      Patient Name: Xochilt Trujillo MRN# [id number]   Age: 89 year old YOB: 1924     Date of Admission:5/23/2017  Primary care provider: Fran Haile  Date of Service: 5/24/2017  Admitting Team: Félix Hanna, M1 in AM.          Assessment and Plan:   Xochilt Trujillo is a 89yF w/ PMhx significant for HTN, prior CVA, frequent UTIs (adm 3/7-3/9 w/ UTI and AMS), and CAD (reported hx of MI) who presents from home with family complaining of altered mental status, found in ED have SBPs >200 which improved w/ nitro gtt. Now admitted to medicine service for further evaluation and management.     ## AMS  ## Hypertensive Urgency/Emergency  -- Family reporting pt w/ mild AMS noticed at 2pm, worsened by 4pm prompting presentation. Stroke code called in ED w/ negative CTOH and MRI brain (demonstrated chronic small vessel changed). Patient noted to be hypertensive w/ SBPs >220, amlodipine PO and nitro gtt initiated w/ improvements in pressures, normotensive on arrival to floor. Pt hx of HTN but on no medications.   -- Etiology of AMS somewhat unclear but possibly 2/2 to severe hypertension. She does have hx of AMS thought to be 2/2 UTIs in past, but per her report has been asymptomatic, largely negative ROS. Also unclear baseline mental status as outpatient cognitive testing has been discussed (although given conservative goals of care was not pursued), also has MRI changes c/w chronic small vessel disease. Per ED note, family describes her baseline as \"alert and attentive.\"  -- Pt pleasanly confused on arrival to floor, awake and conversant but only oriented to self.   -- Will order further infectious w/u including UA and procalcitonin (although afebrile w/o leukocytosis or reported symptoms)  -- Monitor SBPs, goal tonight is <180 systolic  -- Patient also recently prescribed ativan for twitches, unclear if taking    Chronic Medical Issues  # Muscle twitches - " "recently evaluated by neurology, thought to be benign, prescribed small dose ativan, will hold on admission.       CODE: DNR/DNI (as documented on prior admissions and reiterated by patient on admission, recent outpatient notes discussing possible hospice placement).   Diet/IVF: ADAT, no further IVFs  DVT ppx: mechanical  Disposition/Admission Status: inpatient admit, dispo likely home following clearing of mentation    Patient was admitted overnight, to be staffed in AM w/ [facility] staff.     Marco Le MD  IM, PGY-3  [phone]         Chief Complaint:   AMS         HPI:   Xochilt Trujillo is a 89yF w/ PMhx significant for HTN, prior CVA, frequent UTIs (adm 3/7-3/9 w/ UTI and AMS), and CAD (reported hx of MI) who presents from home with family complaining of altered mental status, found in ED have SBPs >200 which improved w/ nitro gtt. Now admitted to medicine service for further evaluation and management.     Patient very pleasantly confused, unable to provide accurate hx of events leading to ED presentation, but does state, \"I felt overwhelmed, I have anxiety, my birthday is coming up.\" Upon chart review her birthday is in 4 days. Otherwise ROS is entirely negative, she denies any recent fevers or chills, no cough, sputum production or SOB, no CP, no abd pain, no dysuria or frequency, normal BMs w/o diarrhea.     Family not present at time of interview. Per ED provider note, reviewed and agree,  \" Xochilt Trujillo is a 89 year old female with a history of abdominal aneurysm, ASHD, CVA, cystocele, erosive gastritis, hypertension, MI, ovarian cancer s/p hysterectomy, and diabetes who presents via EMS for evaluation of altered mental status. Per EMS, patient's family noted the patient was \"not acting right\" around 2 PM this afternoon, and subsequently developed progressively worsening mental status, with \"a significant shift in her mentation\" around 4 PM. Patient's family called EMS on behalf of the patient. EMS " "states the patient had complained of nausea, lightheadedness, and a headache en route. She was given Zofran by EMS. She denied vomiting or chest pain to EMS. EMS reports the patient is typically alert and attentive at baseline. Blood pressures en route were 190s-200s systolic and her blood glucose was in the 150s. EMS did not note any weakness or loss of sensation, and her pupils were equal and reactive for them. EMS reports patient's family did not note any seizure type movement, and patient has no history of seizures. EMS did call a stroke code in the field and the stroke team was at the bedside when the patient arrived.\"           Past Medical History:     Past Medical History:   Diagnosis Date     Abdominal aneurysm (H)     3-5 cm     ASHD (arteriosclerotic heart disease)      CVA (cerebral infarction) 1999     Cystocele      Erosive gastritis      Hypertension      MI (myocardial infarction) (H) 1999     Osteoporosis      Other and unspecified hyperlipidemia      Ovarian cancer (H)      PUD (peptic ulcer disease)      Skin cancer 07/2010    SCCIS of the L nasal bridge     Type 2 diabetes, HbA1C goal < 8% (H) 8/6/2012          Past Surgical History:     Past Surgical History:   Procedure Laterality Date     CATARACT IOL, RT/LT       COLONOSCOPY       D & C       EXTRACAPSULAR CATARACT EXTRATION WITH INTRAOCULAR LENS IMPLANT  1-03/9-04    left/right     HC ESOPHAGOSCOPY, DIAGNOSTIC       HYSTERECTOMY, CERVIX STATUS UNKNOWN      For ovarian cancer          Social History:     Social History     Social History     Marital status:      Spouse name: N/A     Number of children: N/A     Years of education: N/A     Occupational History      Retired     Social History Main Topics     Smoking status: Never Smoker     Smokeless tobacco: Never Used     Alcohol use No     Drug use: No     Sexual activity: No     Other Topics Concern     Parent/Sibling W/ Cabg, Mi Or Angioplasty Before 65f 55m? No     Social History " Narrative          Family History:   No family history on file.  -- Family hx reviewed and not pertinent to current presentation       Immunizations:     Immunization History   Administered Date(s) Administered     Influenza (High Dose) 3 valent vaccine 09/29/2014, 10/11/2016     Influenza (IIV3) 09/13/2010     Pneumococcal (PCV 13) 10/11/2016     Pneumococcal 23 valent 02/22/2003, 01/16/2008     TD (ADULT, 7+) 01/16/2007     TDAP Vaccine (Adacel) 01/02/2017     TDAP Vaccine (Boostrix) 11/25/2013     Zoster vaccine, live 04/13/2017            Allergies:      Allergies   Allergen Reactions     Levaquin [Levofloxacin] Other (See Comments)     Ankle pain     Sulfa Drugs Unknown          Medications:     Prior to Admission Medications   Prescriptions Last Dose Informant Patient Reported? Taking?   LORazepam (ATIVAN) 0.5 MG tablet   No No   Sig: One half at night and one when spasms occur.   ONE TOUCH DELICA LANCETS MISC  Self No No   Sig: Blood sugar check once daily   ORDER FOR DME  Self No No   Sig: Equipment being ordered: walker with wheels and seat   amoxicillin (AMOXIL) 500 MG capsule   No No   Sig: Take 1 capsule (500 mg) by mouth 2 times daily   aspirin 81 MG EC tablet  Self No No   Sig: Take 1 tablet (81 mg) by mouth daily   blood glucose test strip  Self No No   Sig: One touch Ultra test strips testing once daily   ciprofloxacin (CIPRO) 500 MG tablet   No No   Sig: Take 1 tablet (500 mg) by mouth 2 times daily   neomycin-polymyxin-dexamethasone (MAXITROL) 3.5-51356-0.1 OINT ophthalmic ointment   No No   Sig: Place 1 Application Into the left eye 2 times daily   order for DME  Self No No   Sig: Equipment being ordered: First alert notification system.   oxyCODONE-acetaminophen (PERCOCET) 5-325 MG per tablet  Self No No   Sig: Take 0.5-1 tablets by mouth every 4 hours as needed for pain      Facility-Administered Medications: None             Review of Systems:   A complete ROS was performed and is negative  other than what is stated in the HPI.         Physical Exam:   Blood pressure 143/90, pulse 90, temperature 97.7  F (36.5  C), temperature source Oral, resp. rate 16, weight 50.3 kg (111 lb), SpO2 99 %, not currently breastfeeding.  General: pleasant and conversant, laying in bed, NAD, A&O to self only  HEENT: head nc, at, EOMI, oral mucosa moist,   Chest/Resp: breathing comfortably on RA, lungs clear in anterior fields  Heart/CV: RRR, slight systolic murmur  Abdomen/GI: soft, nt, nd, BS+  : deferred  Extremities/MSK: WWP, no edema  Skin: no rashes on gross exam  Neuro: CNs II-XII grossly intact, 5/5 strength in upper and lower extremities  Psych: affect appropriate         Data:   All labs and imaging reviewed by me in Epic.    Cr 1.38 --> 1.5    Marco Le MD  , PGY-3  [phone]

## 2017-05-24 NOTE — PLAN OF CARE
Problem: Goal Outcome Summary  Goal: Goal Outcome Summary  Outcome: No Change  /86 (BP Location: Right arm)  Pulse 90  Temp 97.8  F (36.6  C) (Oral)  Resp 18  Wt 50.3 kg (111 lb)  SpO2 98%  BMI 20.97 kg/m2 on RA.  Badin to self only.  Intermittently anxious, sitter at bedside starting at 1340- team aware.  Poor PO intake/appetite.  MIVF infusing.  Resting between cares.  Family plans to be here ~1500. Will continue with POC.

## 2017-05-24 NOTE — PHARMACY-ADMISSION MEDICATION HISTORY
Admission medication history interview status for the 5/23/2017 admission is complete. See Epic admission navigator for allergy information, pharmacy, prior to admission medications and immunization status.     Medication history interview sources:  [facility] [place]    Changes made to PTA medication list (reason)  Added: None  Deleted: Amoxicillin 500mg BID, Ciprofloxacin 500mg BID, Maxitrol ophthalmic ointment, and Oxycodone-APAP 5-325.   Changed: None    Additional medication history information (including reliability of information, actions taken by pharmacist):  1. Patient is not orientated and unable to speak with for home medication review. [facility] her current pharmacy. Called [facility] in [place] and they have not filled for her since 2014.   2. Previously on 10-day course of Amoxicillin 500mg BID; picked up from pharmacy on 4/14/17.   3. Previously on 7-day course of Cipro 500mg BID; picked up from pharmacy on 5/8/17.   4. Oxycodone-APAP has not been picked up since January 2017 for 28 tablets; assuming this is an old prescription since patient unable to verify.   5. Aspirin 81mg has no history of being refilled at retail pharmacy, but patient may be getting this over the counter. Since patient is currently not a good historian, will leave on medication list for now.       Prior to Admission medications    Medication Sig Last Dose Taking? Auth Provider   LORazepam (ATIVAN) 0.5 MG tablet One half at night and one when spasms occur.  Yes Fran Haile MD   aspirin 81 MG EC tablet Take 1 tablet (81 mg) by mouth daily   Fran Haile MD   order for DME Equipment being ordered: First alert notification system.   Fran Haile MD   blood glucose test strip One touch Ultra test strips testing once daily   Fran Haile MD   ORDER FOR DME Equipment being ordered: walker with wheels and seat   Beatriz Ram PA-C   ONE TOUCH  ROCHELLE LANCFIDENCIO MISC Blood sugar check once daily   Shantal Whitfield MD         Medication history completed by: Brie Márquez, PharmD Student

## 2017-05-24 NOTE — PROGRESS NOTES
BRIEF POST-ROUNDS PROGRESS NOTE    Agree with H&P from earlier today (5/24). Agree with physical exam - pertinents include alert, oriented to person and place but not time or situation. Does not appear to have nuchal rigidity. RRR, normal S1 and S2, soft CELESTINO at LSB. No abdominal pain.    Etiology of encephalopathy is uncertain. Uncertain what patient's baseline mental status is. Will discuss baseline cognition with family. Encephalopathy is unlikely to be due to an infectious etiology. CXR showed hazy bibasilar opacities, procal negative, UA neg, WBC is elevated to 11.6 (likely stress reaction in response to hypertensive urgency/emergency), and is afebrile. Ammonia negative.    Addendum to plan:  - PT/OT consulted  - D5-1/2NS at 100 cc/hr for 1 L  - Continued on amlodipine 5 mg daily  - Urine drug screen ordered  - Recommend patient have hearing aids and glasses on, reorient as needed, lights on and blinds open during day      Patient seen and discussed with my attending physician, Dr. Lopez, who agrees with the above assessment and plan.      Karen Martinez MD, PhD  PGY1 Internal Medicine  Pager: [phone]

## 2017-05-24 NOTE — PLAN OF CARE
Problem: Goal Outcome Summary  Goal: Goal Outcome Summary  Outcome: No Change  BP (!) 170/99 (BP Location: Right arm)  Pulse 90  Temp 98.1  F (36.7  C) (Oral)  Resp 20  Wt 50.3 kg (111 lb)  SpO2 97%  BMI 20.97 kg/m2  HR sinus rhythm, on room air. Maintaining adequate O2 sats. Endorses mild SOB. Pressures elevated 170/90s. BGs 130s.  Pt disoriented to place, time, and situation. Does not maintain information with redirection. Restless majority of night. Call light at hand, bed alarm set. Pt forgets to use call light, bed alarm triggered twice tonight. No neuro deficits noted. Pt denies pain. Clear liquid diet orders. No skin concerns. Voiding adequately with assistance of 2 to bedpan, no BM. Plan for continued close neuro exams. Nursing will continue to follow the POC and update the MD team with concerns.

## 2017-05-24 NOTE — PROGRESS NOTES
/90 (BP Location: Right arm)  Pulse 90  Temp 97.7  F (36.5  C) (Oral)  Resp 16  Wt 50.3 kg (111 lb)  SpO2 99%  BMI 20.97 kg/m2  Pt arrived to  from ED at 0000. Pt came to  for cardiac monitoring and neuro exams. Pt oriented to room and given call light. Instructed of its use. Bed alarm set.Two RN skin assessment completed by MICHELLE Baird and francine Alcantara RN. Nursing will monitor.

## 2017-05-24 NOTE — PROGRESS NOTES
Arrival to Stroke Code: 1834      Stroke Team escalate/de-escalate interventions: TO CT scan, not a candidate for TPA    ED/Bedside Nurse providing handoff: Kayleen MARTINEZ    Time left for CT: 1834      Time Returned to ED/Unit: 1850    ED/Bedside Nurse given handoff to & Time: 1850 gave report and update to Kayleen YUN RN

## 2017-05-25 ENCOUNTER — APPOINTMENT (OUTPATIENT)
Dept: CT IMAGING | Facility: CLINIC | Age: 82
DRG: 948 | End: 2017-05-25
Attending: STUDENT IN AN ORGANIZED HEALTH CARE EDUCATION/TRAINING PROGRAM
Payer: COMMERCIAL

## 2017-05-25 LAB
BACTERIA SPEC CULT: NORMAL
CREAT SERPL-MCNC: 1.51 MG/DL (ref 0.52–1.04)
GFR SERPL CREATININE-BSD FRML MDRD: 32 ML/MIN/1.7M2
Lab: NORMAL
MICRO REPORT STATUS: NORMAL
SPECIMEN SOURCE: NORMAL
WBC # BLD AUTO: 14.6 10E9/L (ref 4–11)

## 2017-05-25 PROCEDURE — 12000006 ZZH R&B IMCU INTERMEDIATE UMMC

## 2017-05-25 PROCEDURE — 99233 SBSQ HOSP IP/OBS HIGH 50: CPT | Mod: GC | Performed by: INTERNAL MEDICINE

## 2017-05-25 PROCEDURE — 74176 CT ABD & PELVIS W/O CONTRAST: CPT

## 2017-05-25 PROCEDURE — 25000128 H RX IP 250 OP 636: Performed by: STUDENT IN AN ORGANIZED HEALTH CARE EDUCATION/TRAINING PROGRAM

## 2017-05-25 PROCEDURE — 40000141 ZZH STATISTIC PERIPHERAL IV START W/O US GUIDANCE

## 2017-05-25 PROCEDURE — 36415 COLL VENOUS BLD VENIPUNCTURE: CPT | Performed by: STUDENT IN AN ORGANIZED HEALTH CARE EDUCATION/TRAINING PROGRAM

## 2017-05-25 PROCEDURE — 85048 AUTOMATED LEUKOCYTE COUNT: CPT | Performed by: STUDENT IN AN ORGANIZED HEALTH CARE EDUCATION/TRAINING PROGRAM

## 2017-05-25 PROCEDURE — 40000802 ZZH SITE CHECK

## 2017-05-25 PROCEDURE — 25000132 ZZH RX MED GY IP 250 OP 250 PS 637: Performed by: INTERNAL MEDICINE

## 2017-05-25 PROCEDURE — 82565 ASSAY OF CREATININE: CPT | Performed by: STUDENT IN AN ORGANIZED HEALTH CARE EDUCATION/TRAINING PROGRAM

## 2017-05-25 PROCEDURE — 25000132 ZZH RX MED GY IP 250 OP 250 PS 637: Performed by: STUDENT IN AN ORGANIZED HEALTH CARE EDUCATION/TRAINING PROGRAM

## 2017-05-25 RX ADMIN — SODIUM CHLORIDE 1000 ML: 9 INJECTION, SOLUTION INTRAVENOUS at 10:44

## 2017-05-25 RX ADMIN — AMLODIPINE BESYLATE 5 MG: 5 TABLET ORAL at 09:05

## 2017-05-25 RX ADMIN — ASPIRIN 81 MG: 81 TABLET, COATED ORAL at 09:05

## 2017-05-25 ASSESSMENT — ACTIVITIES OF DAILY LIVING (ADL)
TRANSFERRING: 3-->ASSISTIVE EQUIPMENT AND PERSON
COGNITION: 2 - DIFFICULTY WITH ORGANIZING THOUGHTS
TOILETING: 3-->ASSISTIVE EQUIPMENT AND PERSON
AMBULATION: 3-->ASSISTIVE EQUIPMENT AND PERSON
FALL_HISTORY_WITHIN_LAST_SIX_MONTHS: NO
DRESS: 0-->INDEPENDENT
RETIRED_COMMUNICATION: 0-->UNDERSTANDS/COMMUNICATES WITHOUT DIFFICULTY
RETIRED_EATING: 0-->INDEPENDENT
SWALLOWING: 0-->SWALLOWS FOODS/LIQUIDS WITHOUT DIFFICULTY
BATHING: 2-->ASSISTIVE PERSON

## 2017-05-25 NOTE — PLAN OF CARE
Problem: Goal Outcome Summary  Goal: Goal Outcome Summary  Shift:2465-0504  BP (!) 153/95 (BP Location: Right arm)  Pulse 95  Temp 98.4  F (36.9  C) (Oral)  Resp 20  Wt 50.3 kg (111 lb)  SpO2 98%  BMI 20.97 kg/m2     VSS, intermittently tachy into low 100's, on RA. A&O to self only, intermittently anxious/impulsive, sitter at bedside Was not impulsive and was pleasant with family present on days, after daughter left at shift change pt attempted getting OOB w/sitter present, was frightened asking for her daughter; sitter took her for long wheelchair ride which helped to keep her calm. Regular diet, must be supervised but has poor PO intake/appetite.  R-PIV saline locked. Has been resting between cares, awakes easily for assessment but is not always able to cooperate d/t [place] and confusion. Son () and daughter (Tone) were here until shift change, but left before med rec/admit Q's asked; daughter Dawna, came later this evening but stayed for a short time and Q's were not able to be asked before this time. Continue with current plan of care.

## 2017-05-25 NOTE — PLAN OF CARE
Problem: Goal Outcome Summary  Goal: Goal Outcome Summary  Outcome: No Change  /71 (BP Location: Right arm)  Pulse 95  Temp 98.2  F (36.8  C) (Oral)  Resp 18  Wt 50.3 kg (111 lb)  SpO2 98%  BMI 20.97 kg/m2     1901-6801: VSS on room air; BP WNL.  Denies pain.  Disoriented x4, unable to be reoriented; no episodes of fear.  Bedside attendant at all times. Slept well, voiding spontaneously.  [place] at baseline.  Supervision with oral intake at all times.  Unable to complete admission assessment due to no family visiting overnight.  Will continue to monitor and update MD as needed.

## 2017-05-25 NOTE — PLAN OF CARE
Problem: Goal Outcome Summary  Goal: Goal Outcome Summary  Outcome: No Change  A&O x 0. VSS, afebrile. Appetite poor, refuses to eat unless family in room. Sitter at bedside. C/o diffuse, tender abd pain, abd CT completed. Continue POC.

## 2017-05-25 NOTE — PROGRESS NOTES
Social Work: Assessment with Discharge Plan    Patient Name:  Xochilt Trujillo  :  1924  Age:  89 year old  MRN:  [id number]  Risk/Complexity Score:  Filed Complexity Screen Score: 11  Completed assessment with:  Pt, pt's son , and pt's daughter Bárbara.    Presenting Information   Reason for Referral:  Discharge plan and Potential need for community services upon discharge  Date of Intake:  May 25, 2017  Referral Source:  Chart Review  Decision Maker:  Pt's daughter Suzi is POA.  Alternate Decision Maker:  NA  Health Care Directive:  Pt's children state pt has a HCD, POA, and Will.  Living Situation: Pt lives with her daughter Tone Chavarria in [place], [place].    Previous Functional Status:  Pt's dtr assists with medications, bathing, lays out pt's clothes, and assists with any other needs pt has. Pt has Meals on Wheels. Pt's children state pt is not confused this much at baseline and feels pt's confusion is increased d/t being in the hospital.  Patient and family understanding of hospitalization:  Pt is disoriented x4 and has 1:1 attendant. Pt's children all have a good understanding of pt's hospitalization and illness.  Cultural/Language/Spiritual Considerations:  English is primary language. No cultural or spiritual considerations.  Adjustment to Illness:  Unable to assess pt.    Physical Health  Reason for Admission:    1. Hypertensive urgency    2. Acute delirium    3. Atherosclerosis of native coronary artery of native heart without angina pectoris    4. Personal history of transient cerebral ischemia      Services Needed/Recommended:  PT/OT ordered but have not evaluated yet.     Mental Health/Chemical Dependency  Diagnosis:  none  Support/Services in Place:  n/a  Services Needed/Recommended:  n/a    Support System  Significant relationship at present time:  . Primary support from pt's children.  Family of origin is available for support:  Pt has 7 adults children who are all very  "involved and supportive. One of pt's children is at the hospital all the time. Pt's dtr Tone is with pt 24/7 at home. Oldest dtr Danyelle is POA but pt's children make decisions together.   Other support available:  none  Gaps in support system:  none  Patient is caregiver to:  None     Provider Information   Primary Care Physician:  Fran Haile   [phone]   Clinic:  [facility] [address] [facility]      :  none    Financial   Income Source:  Social Security, Pension, savings acct.  Financial Concerns:  Pt's children are not sure if pt would be eligible for Medical Assistance then a [facility] waiver, but they are wanting to explore that option. Will ask [facility] financial counselor to see pt.  Insurance:    Payor/Plan Subscriber Name Rel Member # Group #   UCARE - UCARE FOR SENESTEE [employer]ARRAGAN  [id number] [facility]      [address]       Discharge Plan   Patient and family discharge goal:  Pt's children discussed TCU vs home and writer provided list of Medicare certified SNFs. At this time, pt's children would like pt to dc home with home care. Pt's children feel that pt's confusion would only worsen at a TCU and pt would decline quickly. Pt's dtr Bárbara stated, \"If we send her to a nursing home or transitional care, we might as well say goodbye.\" Pt's children feel pt will do better and be less confused when back to home and pt's children are prepared to get more services at home if needed. Pt's dtrs Tone and Sabra can provide 24/7 care at home. Writer also provided information for [facility] and private pay home care agencies should pt's family decide to hire additional services.  Provided education on discharge plan:  YES  Patient agreeable to discharge plan:  YES  Barriers to discharge:  Medical clearance    Discharge Recommendations   Anticipated Disposition:  Per rounds, possible dc 1-2 days to home with home care and 24/7 family assistance. Pt's family states " pt has had [facility] in the past and they would like to resume these services for RN, PT, OT, HHA. Pt's family might also hire additional private pay services.   Transportation Needs:  Family to transport.  Name of Transportation Company and Phone:  [employer] [facility], [facility]  6B Intermediate Care Unit  Phone: [phone]  Pager: [phone]

## 2017-05-25 NOTE — DISCHARGE INSTRUCTIONS
You will be receiving a call from your primary care clinic to confirm the time and date of your follow up appointment. This appointment should be within 7 days of discharge. If you do not hear from them within 2 business days, please call them at [phone].        Discharge RN: please fax completed orders to  [facility]  Ph: [phone]  Fax: [phone]

## 2017-05-25 NOTE — PROGRESS NOTES
[facility], [place]   Internal Medicine Daily Note     Interval History  History is limited as patient is unable to respond to questions. Endorses abdominal pain. Not oriented to place.     Review of Systems  ROS limited by mental status.      Medical Student Note Resident Note   Assessment and Plan (Student)    Assessment:      # Altered Mental Status  Delirium  Patient continues to have altered mental status. Due to ongoing worsening from baseline, lack of orientation in hospital setting with absence of underlying etiology, this is most likely delirium. May be related to Oxycodone 5mg day prior to admission, 2 extra doses of 0.5 mg Ativan on 5/21 and 5/22. Infectious workup is so far unrevealing, including normal UA, CXR, and without fever. However, her WBC increased to 14.6 today. CT abd/pelvis performed due to ongoing abdominal pain, WBC elevation, and tachycardia; negative for diverticulitis or other etiology.   -- Recheck WBC, Creatinine, Procalcitonin  -- Reduce risk of continued delirium - encourage orientation, normal sleep/wake cycle with daylight, ambulate with assistance as able, no overnight BP checks,     # Hypertensive Urgency  Blood pressures continue to remain stable with only mild antihypertensive medication. Her episode of hypertension provoking admission seems to be an isolated episode not related to underlying worrisome pathology. No likely CNS or renal origin based on workup so far.   -- Continue Amlodipine 5 mg  -- Labetalol q6h prn for BP >180/105  -- Discontinue overnight blood pressure checks     Assessment and Plan (Resident)  Xochilt Trujillo is a 89 yo female w/ PMhx significant for HTN, prior CVA, frequent UTIs (adm 3/7-3/9 w/ UTI and AMS), and CAD (reported hx of MI) who presents from home with family complaining of altered mental status, found in ED have SBPs >200 which improved w/ nitro gtt. Now admitted to medicine service for further evaluation and  management.     Changes today:  -- CT abd/pelvis without contrast   -- 1 L IVF    # Delirium on dementia:  Family has noticed worsening mental status a few days PTA. Stroke code called in ED with negative CTOH and MRI brain (demonstrated chronic small vessel changed). Etiology of delirium unclear. Infectious workup negative - CXR showed hazy bibasilar opacities, procal negative, UA neg, WBC is elevated to 11.6 -> 14.6 (likely stress reaction in response to hypertensive urgency/emergency), and is afebrile. Ammonia negative. Since patient endorsed abdominal pain and had tachycardia (HR 90s-100s) and worsening leukocytosis, obtained CT abd/pelvis without contrast due to CKD and it was negative for diverticulitis or abscess. Delirium could be drug-induced since she was taking Ativan 0.25 mg daily for spasms since April and was taking Ativan 0.5 mg daily a few days PTA along with some oxycodone 5 mg PTA. Delirium could be worsened by foreign environment.  - Recommend patient have hearing aids and glasses on, reorient as needed, lights on and blinds open during day  - Continue to monitor  - Hold deliriogenics    # Hypertensive Urgency  Patient has history of HTN but family noted that she was taken off antihypertensives a couple years ago due to presyncope. SBPs 190s-200s en route to ED. Amlodipine PO and nitro gtt initiated without improvements in pressures, normotensive on arrival to floor.   -- Continue amlodipine 5 mg daily  -- PRN labetalol for SBP>180/105     Chronic Medical Issues  # Muscle twitches - recently evaluated by neurology, thought to be benign, prescribed small dose ativan, will hold continue to hold. Recommend considering following up with PCP/Neurology to select another agent for treatment       CODE: DNR/DNI (as documented on prior admissions and reiterated by patient on admission, recent outpatient notes discussing possible hospice placement).   Diet: Regular diet and intermittent IVF  DVT ppx:  mechanical  Disposition/Admission Status: inpatient admit, patient does not want patient to discharge to TCU. Referral placed for home RN, PT/OT, and HHA.      Patient seen and discussed with my attending physician, Dr. Lopez, who agrees with the above assessment and plan.      Karen Martinez MD, PhD  PGY1 Internal Medicine  Pager: [phone]      Physical Exam (Student)  Blood pressure 128/76, pulse 88, temperature 98.1  F (36.7  C), temperature source Oral, resp. rate 16, weight 50.3 kg (111 lb), SpO2 96 %     Const: Patient restless and wanting to leave room.   Eyes: Pupils are equal and reactive to light. No nystamus  Cardiovascular: Irregular rhythm. Intermittent tachycardia during exam. Normal S1 & S2, no extra heart sounds  Respiratory: Lungs are clear to auscultation bilaterally  Abdomen: Abdomen soft, diffuse tenderness. BS normal. No masses, organomegaly  Neuro: Oriented to person, not to place or year. Responds to commands after repetition. Moving all extremities independently. Difficulty forming sentences.     Physical Exam (Resident)  Vitals were reviewed    General: alert, oriented only to self and not place or time  HEENT: NC/AT, dry mucous membranes  CV: tachycardic, regular rhythm, normal S1 and S2, soft CELESTINO at LSB  Resp: CTAB, no w/r/r  Abd: soft, diffuse TTP with perhaps some voluntary guarding at LLQ  Extremities: no LE edema   I have reviewed today's vital signs, medications, labs and imaging.     Ilia Ibarra 5/25/2017 4:56 PM I have reviewed today's vital signs, medications, labs and imaging.     Karen Martinez 5/25/2017 6:06 PM     Data:  Medications  I have reviewed this patient's current medications    Lab  WBC 14.6  Cr 1.51    Imaging  All imaging studies reviewed by me.   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

## 2017-05-25 NOTE — PLAN OF CARE
Problem: Goal Outcome Summary  Goal: Goal Outcome Summary  PT/6B: Rescheduled. Attempted x 2, first attempt patient outside with family, 2nd attempt patient just fell asleep per sitter and would benefit from resting.

## 2017-05-25 NOTE — PROGRESS NOTES
Care Coordinator Progress Note     Admission Date/Time:  5/23/2017  Attending MD:  Nola Pretty MD     Data  Chart reviewed, discussed with interdisciplinary team.   Patient was admitted for:    Hypertensive urgency  Acute delirium  Atherosclerosis of native coronary artery of native heart without angina pectoris  Personal history of transient cerebral ischemia.    Concerns with insurance coverage for discharge needs: None.  Current Living Situation: Patient lives with family.  Support System: Supportive and Involved  Services Involved: Home Care  Transportation: Family or Friend will provide  Barriers to Discharge: medical course    Coordination of Care and Referrals: Provided patient/family with options for Home Care.        Assessment  Per SW who met with patient and family, they no longer would like patient to discharge to a TCU. Patient lives at home with her daughter and has someone with her 24/7. They have used [facility] home care in the past and is choosing to work with them again. Referral placed for RN, PT, OT, and HHA. Patient still altered and family feel that patient will improve once home with family and in her own environment. RNCC to continue to follow for discharge planning and needs.     Plan  Anticipated Discharge Date:  Tomorrow/saturday   Anticipated Discharge Plan:  Home with family and home care    Suzi Cardoso RN

## 2017-05-26 ENCOUNTER — APPOINTMENT (OUTPATIENT)
Dept: PHYSICAL THERAPY | Facility: CLINIC | Age: 82
DRG: 948 | End: 2017-05-26
Attending: STUDENT IN AN ORGANIZED HEALTH CARE EDUCATION/TRAINING PROGRAM
Payer: COMMERCIAL

## 2017-05-26 VITALS
DIASTOLIC BLOOD PRESSURE: 75 MMHG | OXYGEN SATURATION: 97 % | SYSTOLIC BLOOD PRESSURE: 145 MMHG | WEIGHT: 97.4 LBS | BODY MASS INDEX: 18.4 KG/M2 | TEMPERATURE: 97.5 F | HEART RATE: 98 BPM | RESPIRATION RATE: 18 BRPM

## 2017-05-26 LAB
ACETAMINOPHEN QUAL: POSITIVE
AMANTADINE: NEGATIVE
AMITRIPTYLINE QUAL: NEGATIVE
AMOXAPINE: NEGATIVE
AMPHETAMINES QUAL: NEGATIVE
ATROPINE: NEGATIVE
BENZODIAZ UR QL: ABNORMAL
CAFFEINE QUAL: POSITIVE
CANNABINOIDS UR QL SCN: ABNORMAL
CARBAMAZEPINE QUAL: NEGATIVE
CHLORPHENIRAMINE: NEGATIVE
CHLORPROMAZINE: NEGATIVE
CITALOPRAM QUAL: NEGATIVE
CLOMIPRAMINE QUAL: NEGATIVE
COCAINE QUAL: NEGATIVE
COCAINE UR QL: ABNORMAL
CODEINE QUAL: NEGATIVE
CREAT SERPL-MCNC: 1.28 MG/DL (ref 0.52–1.04)
CRP SERPL-MCNC: 23 MG/L (ref 0–8)
DESIPRAMINE QUAL: NEGATIVE
DEXTROMETHORPHAN: NEGATIVE
DIPHENHYDRAMINE: NEGATIVE
DOXEPIN/METABOLITE: NEGATIVE
DOXYLAMINE: NEGATIVE
EPHEDRINE OR PSEUDO: NEGATIVE
FENTANYL QUAL: NEGATIVE
FLUOXETINE AND METAB: NEGATIVE
GFR SERPL CREATININE-BSD FRML MDRD: 39 ML/MIN/1.7M2
HYDROCODONE QUAL: NEGATIVE
HYDROMORPHONE QUAL: NEGATIVE
IBUPROFEN QUAL: NEGATIVE
IMIPRAMINE QUAL: NEGATIVE
LACTATE BLD-SCNC: 0.8 MMOL/L (ref 0.7–2.1)
LAMOTRIGINE QUAL: NEGATIVE
LOXAPINE: NEGATIVE
MAPROTYLINE: NEGATIVE
MDMA QUAL: NEGATIVE
MEPERIDINE QUAL: NEGATIVE
METHAMPHETAMINE: NEGATIVE
METHODONE QUAL: NEGATIVE
MORPHINE QUAL: NEGATIVE
NICOTINE: NEGATIVE
NORTRIPTYLINE QUAL: NEGATIVE
OLANZAPINE QUAL: NEGATIVE
OPIATES UR QL SCN: ABNORMAL
OXYCODONE QUAL: NEGATIVE
PENTAZOCINE: NEGATIVE
PHENCYCLIDINE QUAL: NEGATIVE
PHENMETRAZINE: NEGATIVE
PHENTERMINE: NEGATIVE
PHENYLBUTAZONE: NEGATIVE
PHENYLPROPANOLAMINE: NEGATIVE
PROCALCITONIN SERPL-MCNC: NORMAL NG/ML
PROPOXPHENE QUAL: NEGATIVE
PROPRANOLOL QUAL: NEGATIVE
PYRILAMINE: NEGATIVE
SALICYLATE QUAL: NEGATIVE
THEOBROMINE: POSITIVE
TOPIRAMATE QUAL: NEGATIVE
TRIMIPRAMINE QUAL: NEGATIVE
VENLAFAXINE QUAL: ABNORMAL
WBC # BLD AUTO: 11 10E9/L (ref 4–11)

## 2017-05-26 PROCEDURE — 84145 PROCALCITONIN (PCT): CPT | Performed by: STUDENT IN AN ORGANIZED HEALTH CARE EDUCATION/TRAINING PROGRAM

## 2017-05-26 PROCEDURE — 86140 C-REACTIVE PROTEIN: CPT | Performed by: STUDENT IN AN ORGANIZED HEALTH CARE EDUCATION/TRAINING PROGRAM

## 2017-05-26 PROCEDURE — 36415 COLL VENOUS BLD VENIPUNCTURE: CPT | Performed by: INTERNAL MEDICINE

## 2017-05-26 PROCEDURE — 97530 THERAPEUTIC ACTIVITIES: CPT | Mod: GP | Performed by: PHYSICAL THERAPIST

## 2017-05-26 PROCEDURE — 97161 PT EVAL LOW COMPLEX 20 MIN: CPT | Mod: GP | Performed by: PHYSICAL THERAPIST

## 2017-05-26 PROCEDURE — 25000132 ZZH RX MED GY IP 250 OP 250 PS 637: Performed by: INTERNAL MEDICINE

## 2017-05-26 PROCEDURE — 83605 ASSAY OF LACTIC ACID: CPT | Performed by: INTERNAL MEDICINE

## 2017-05-26 PROCEDURE — 25000132 ZZH RX MED GY IP 250 OP 250 PS 637: Performed by: STUDENT IN AN ORGANIZED HEALTH CARE EDUCATION/TRAINING PROGRAM

## 2017-05-26 PROCEDURE — 40000193 ZZH STATISTIC PT WARD VISIT: Performed by: PHYSICAL THERAPIST

## 2017-05-26 PROCEDURE — 82565 ASSAY OF CREATININE: CPT | Performed by: STUDENT IN AN ORGANIZED HEALTH CARE EDUCATION/TRAINING PROGRAM

## 2017-05-26 PROCEDURE — 85048 AUTOMATED LEUKOCYTE COUNT: CPT | Performed by: STUDENT IN AN ORGANIZED HEALTH CARE EDUCATION/TRAINING PROGRAM

## 2017-05-26 PROCEDURE — 99239 HOSP IP/OBS DSCHRG MGMT >30: CPT | Mod: GC | Performed by: INTERNAL MEDICINE

## 2017-05-26 RX ORDER — AMLODIPINE BESYLATE 5 MG/1
5 TABLET ORAL DAILY
Qty: 30 TABLET | Refills: 1 | Status: SHIPPED | OUTPATIENT
Start: 2017-05-26 | End: 2019-01-01

## 2017-05-26 RX ADMIN — AMLODIPINE BESYLATE 5 MG: 5 TABLET ORAL at 09:17

## 2017-05-26 RX ADMIN — ASPIRIN 81 MG: 81 TABLET, COATED ORAL at 09:17

## 2017-05-26 NOTE — PLAN OF CARE
D/I: Increasingly alert. Oriented to self and situation. Waxes and wanes on time and place. Pt eager to go home, states she wants to be home for her 89rd birthday (Sunday). Meggan Merrill was updated by writer, she is aware that pt is cleared for discharge home with family assist 24/7. Sitter remains at bedside until pt DC's, due to impulsivity (pt tries to get out of bed without calling). Good appetite, no nausea. Cleared for discharge by Félix Taylor medical team. Telemetry DC'd.   A: Stable for DC home with family assist (24/7).   P:  Will DC home when meggan Merrill picks her up this evening. Continue to monitor. Continue plan of care.

## 2017-05-26 NOTE — DISCHARGE SUMMARY
DISCHARGE                         5/26/2017  4:42 PM  ----------------------------------------------------------------------------  Discharged to: Home  Via: private transportation  Accompanied by: Family  Discharge Instructions: diet, activity, medications, follow up appointments, when to call the MD, aftercare instructions.  Prescriptions: To be filled by [facility]  pharmacy; medication list reviewed & sent with pt  Follow Up Appointments: arranged; information given  Belongings: All sent with pt  IV: d/c'd  Telemetry: d/c'd  Pt's caregiver exhibits understanding of above discharge instructions; all questions answered.    Discharge Paperwork: Signed, copied, and sent home with patient.

## 2017-05-26 NOTE — PROGRESS NOTES
Attending Day of Discharge Note    Xochilt Trujillo was seen and examined by me today and is ready for discharge. I reviewed the discharge plan with the resident team, and agree with the final assessment and plan for discharge as noted in our discharge summary. I personally spent more than 30 minutes today on discharge activities for this patient.  Ez Lopez MD  [phone]

## 2017-05-26 NOTE — PROVIDER NOTIFICATION
Time of notification: 4:14 AM  MD notified: Félix rees MD  Patient status: Stable  T: 98.6  HR: 101  BP: 132/84 (108)  O2: 98% on RA    Pt triggered sepsis protocol. Notified MD, ordered LA check and vitals q30min for 1hour.

## 2017-05-26 NOTE — PLAN OF CARE
Problem: Goal Outcome Summary  Goal: Goal Outcome Summary  Outcome: No Change  Neuro: Disoriented to place, time, and situation. Follows commands. Can be impulsive, sitter at bedside.  Cardiac: SR, ST upon exertion, HR 90's-low 100's, 's-120's, afebrile.        Respiratory: Sating in 90's on RA.  GI/: Adequate urine output. Uses commode. BM X0.  Diet/appetite: Tolerating regular diet.   Activity:  Assist of 1-2 up to commode.  Pain: Denies  Skin: Intact, no new deficits noted.  IV: Lft PIV     Pt triggered sepsis protocol this shift. Creat. And CRP came back high, all other labs and vitals continue to be WDL.     Will continue with POC. Notify primary team with changes.

## 2017-05-26 NOTE — PROGRESS NOTES
"   05/26/17 0954   Quick Adds   Type of Visit Initial PT Evaluation   Living Environment   Lives With child(edy), adult   Living Arrangements house   Number of Stairs to Enter Home 4   Number of Stairs Within Home 0   Transportation Available family or friend will provide   Living Environment Comment Does have basement but doesn't need to go down there. One grab bar by toilet. Tub shower combo with seat   Self-Care   Usual Activity Tolerance fair   Current Activity Tolerance fair   Equipment Currently Used at Home walker, rolling;walker, standard   Functional Level Prior   Ambulation 3-->assistive equipment and person   Transferring 3-->assistive equipment and person   Fall history within last six months (pt unable to report)   General Information   Onset of Illness/Injury or Date of Surgery - Date 05/23/17   Referring Physician Karen Martinez MD   Patient/Family Goals Statement go home   Pertinent History of Current Problem (include personal factors and/or comorbidities that impact the POC) Pt is a 89 year old female admitted w/ AMS, SBP > 200mmHg (HTN urgency). PMH significant for dementia, HTN, osteoporosis, prior CVA, CAD   Precautions/Limitations fall precautions   Heart Disease Risk Factors Diabetes;High blood pressure;Gender;Age;Medical history   General Observations female pt sitting up in chair, [place]; sitter at bedside   Cognitive Status Examination   Orientation person  (states month is \"may\" did not know year)   Level of Consciousness alert;confused   Follows Commands and Answers Questions 100% of the time;able to follow single-step instructions  (repeated cues provided)   Personal Safety and Judgment at risk behaviors demonstrated;impaired   Memory impaired   Cognitive Comment dementia   Pain Assessment   Patient Currently in Pain No   Integumentary/Edema   Integumentary/Edema Comments no edema noted; bruise noted on LLE   Posture    Posture Forward head position;Protracted shoulders  (mild " "kyphosis)   Range of Motion (ROM)   ROM Comment B shoulder flexion self limited ~120 degrees; otherwise BUE/LE AROM WFL   Strength   Strength Comments demos > 3/5 strength in all extremities; no MMT   Bed Mobility   Bed Mobility Comments supine <>sit min A;    Transfer Skills   Transfer Comments STS w/ CGA to WW, cues for safe hand placement   Gait   Gait Comments pt ambulated 75 ft w/ WW w/ CGA; short step length, occasional min A for steering WW;    Balance   Balance Comments standing balance w/ WW UE support, CGA   Sensory Examination   Sensory Perception Comments NT   General Therapy Interventions   Planned Therapy Interventions bed mobility training;gait training;transfer training   Clinical Impression   Criteria for Skilled Therapeutic Intervention yes, treatment indicated   PT Diagnosis gait instability, impaired functional mobility   Influenced by the following impairments impaired cognition; impaired balance impaired safety awareness   Functional limitations due to impairments impaired IND w/ transfers, gait, stairs   Clinical Presentation Stable/Uncomplicated   Clinical Presentation Rationale dementia; 24/7 A available at home, comorbidities   Clinical Decision Making (Complexity) Low complexity   Therapy Frequency` 3 times/week   Predicted Duration of Therapy Intervention (days/wks) 5/26/17   Anticipated Discharge Disposition Home with Home Therapy  (+ 24/7 family A)   Risk & Benefits of therapy have been explained Yes   Patient, Family & other staff in agreement with plan of care Yes   [facility] [employer] TM \"6 Clicks\"   2016, Trustees of [facility], under license to [employer].  All rights reserved.   6 Clicks Short Forms Basic Mobility Inpatient Short Form   [facility] [employer]  \"6 Clicks\" V.2 Basic Mobility Inpatient Short Form   1. Turning from your back to your side while in a flat bed without using bedrails? 3 - A Little   2. Moving from lying on your back to sitting on the side " of a flat bed without using bedrails? 3 - A Little   3. Moving to and from a bed to a chair (including a wheelchair)? 3 - A Little   4. Standing up from a chair using your arms (e.g., wheelchair, or bedside chair)? 3 - A Little   5. To walk in hospital room? 3 - A Little   6. Climbing 3-5 steps with a railing? 2 - A Lot   Basic Mobility Raw Score (Score out of 24.Lower scores equate to lower levels of function) 17   Total Evaluation Time   Total Evaluation Time (Minutes) 15

## 2017-05-26 NOTE — PLAN OF CARE
Problem: Goal Outcome Summary  Goal: Goal Outcome Summary  Shift: 4412-0408     A&O x1. VSS, afebrile. Denies pain, no mention of nausea or abdominal pain this titi. Pt ordered dinner, ate 50%, tolerated well. Sitter at bedside. CT Abd/Pelv completed, impression- indications of diverticulosis and increase in size of AAA from 2013 US comparison, mentioned this to overnight RN and no new orders/acknowledgments from MD regarding results this titi.  Continue POC.

## 2017-05-26 NOTE — PLAN OF CARE
Problem: Goal Outcome Summary  Goal: Goal Outcome Summary  PT/6b: PT eval completed. Pt requiring hands on assist (CGA) for all transfers, ambulation, and bed mobility d/t impaired safety awareness in setting of dementia. Pt has A for all mobility at home, uses WW at baseline. Pt able to ambulate ~75 ft x2 w/ WW w/ CGA- occasional min A for steering WW and turning. Recommend pt return home w/ family assist (24/7), home PT assessment may be warranted to ensure safe household mobility.

## 2017-05-26 NOTE — PROVIDER NOTIFICATION
Time of notification: 5:29 AM  MD notified: Félix rees MD  Patient status: Stable    Pt update post sepsis protocol:    T: 98.6  HR: 93  /57  RR: 16  O2: 97% RA    Creat: 1.28  CRP: 23  WBC: 11  LA: 0.8

## 2017-05-27 NOTE — DISCHARGE SUMMARY
Medicine Discharge Summary  Xochilt Trujillo MRN: [id number]  5/28/1924  Primary care provider: Fran Haile  ___________________________________          Date of Admission:  5/23/2017  Date of Discharge:  5/27/2017   Admitting Physician:  Nola Pretty MD  Discharge Physician:  Ez Lopez MD   Discharging Service:  Internal Medicine, [place] [address]     Primary Provider: Fran Haile         For PCP Follow-up:   1. Post-discharge follow-up; follow-up with blood pressure and mental status  2. Determine whether an alternative to Ativan can be used for hand tremors         Reason for Admission:   Altered mental status and hypertension          Discharge Diagnosis:   Delirium on dementia  Hypertension vs hypertensive urgency    Secondary diagnoses:  Muscle twitches         Procedures & Significant Findings:       Results for orders placed or performed during the hospital encounter of 05/23/17   Chest  XR, 1 view portable    Narrative    EXAM: XR CHEST PORT 1 VW  5/23/2017 7:16 PM      HISTORY: chest pain    COMPARISON: Radiograph 3/7/2017, 2/2/2016    FINDINGS: AP radiograph of the chest. Cardiac silhouette is  unremarkable. Annular calcification of the mitral valve. Hazy  bibasilar opacities. No pneumothorax. No pleural effusion.  Calcified  pulmonary nodule in the left upper lobe, stable. Stable nodular  opacities in the right upper lobe. There is widening of the right  paratracheal stripe, which may be projectional as it was prominent on  the prior radiographs.      Impression    IMPRESSION:   1. Hazy bibasilar opacities. Findings may indicate atelectasis,  infection or be sequela of aspiration.  2. Widening of the right paratracheal stripe. This appears mildly more  prominent compared with 2/2/2016. However, there is a neck CT dated  2/2/2016 which did not demonstrate a mass in the right upper lobe. As  a result, these findings are  likely projectional.    I have personally reviewed the examination and initial interpretation  and I agree with the findings.    JULIETA ZELAYA MD   CT Head w/o Contrast    Narrative    CT HEAD W/O CONTRAST 5/23/2017 6:58 PM    History: Evaluate for dissection/thromboembolism    Comparison: Head CT 3/6/2017, brain MRI 11/11/2016.    Technique: Using multidetector thin collimation helical acquisition  technique, axial, coronal and sagittal CT images from the skull base  to the vertex were obtained without intravenous contrast.     Findings:    No intracranial hemorrhage. Gray-white matter differentiation is  intact. 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  Ventricles are not enlarged out of proportion to the cerebral sulci an  unchanged in size since 3/6/2017. No mass effect, midline shift or  abnormal extra axial fluid collection.     Bilateral pseudophakia. The visualized paranasal sinuses are clear.  The mastoid air cells are clear. Calvarium is intact.       Impression    Impression: No significant change since 3/6/2017. No acute  intracranial pathology.    MARCO EWING MD   MR Brain for Stroke Limited    Narrative    MR BRAIN FOR STROKE LIMITED 5/23/2017 8:23 PM    Provided History: confusion, headache    Comparison:  Head CT 5/23/2017. Brain MRI 11/11/2016     Technique: Multiphasic multisequence MRI of the brain using the quick  stroke protocol. No intravenous contrast was administered.    Findings:   No abnormal foci restricted diffusion to suggest acute infarct.    Stable moderate leukoaraiosis and generalized cerebral/cerebellar  parenchymal volume loss. Ventricles are not enlarged out of proportion  to the cerebral sulci. No mass effect, midline shift or abnormal extra  axial fluid collection. Patent major intracranial vascular flow voids.    Paranasal sinuses and mastoid air cells are clear. Bilateral  pseudophakia.       Impression    Impression: No significant change since 11/11/2016. No acute infarct.  Stable moderate leukoaraiosis and generalized parenchymal volume loss.    I have personally reviewed the examination and initial interpretation  and I agree with the findings.    MARCO EWING MD   CT Abdomen Pelvis w/o Contrast    Narrative    EXAMINATION: CT ABDOMEN PELVIS W/O CONTRAST, 5/25/2017 1:49 PM    TECHNIQUE:  Helical CT images from the lung bases through the  symphysis pubis were obtained without IV contrast.    COMPARISON: 5/11/2011, ultrasound from 11/5/2013    HISTORY: Please obtain with PO contrast. Has abdominal pain,  leukocytosis, and delirium. Looking for possible source of infection    FINDINGS:    Abdomen and pelvis: Increased size of a 4.3 x 4.4 cm infrarenal  fusiform aortic aneurysm, previously 3.8 x 3.9 cm on 2013 ultrasound  and 3.6 cm on 2011 CT. No significant surrounding fat stranding or  hematoma to suggest perforation.    There is extensive colonic diverticulosis. There are also multiple  diverticula involving the small bowel. No convincing wall thickening  or surrounding inflammatory changes to indicate an active  diverticulitis metallic density along the wall of the transverse colon  causes streak artifact and is of undetermined etiology, possibly  contained within a diverticulum. No pneumoperitoneum or free fluid.    Left parapelvic cysts and bilateral extrarenal pelves. Some motion  artifact affects the gallbladder, but it does not appear significantly  distended which would argue against cholecystitis. No radiopaque  gallstones. Appendix is air-filled without significant surrounding  inflammation. Coarse calcification in the spleen. Adrenals, pancreas,  and liver are all normal. No suspicious lymphadenopathy.    Lung bases: In addition to chronic scarring in the lung bases, there  is increased possible consolidation present bilaterally, most  conspicuous in the right middle lobe. No  distinctly infectious  appearing opacities are identified. Coarse mitral annular  calcifications. Coronary artery calcifications.    Bones and soft tissues: Right pubic ramus healed fracture deformities.  Severe osteopenia. Degenerative changes in the hips and lumbar spine.      Impression    IMPRESSION:   1. Infrarenal abdominal aortic aneurysm measuring up to 4.4 cm has  increased since 2013.  2. Areas of probable consolidation superimposed on chronic scarring in  the lung bases could potentially be infectious given patient's signs  and symptoms.  3. Extensive colonic and small bowel diverticulosis without convincing  evidence of diverticulitis.  4. Severe osteopenia and healed pelvic fracture deformities.    I have personally reviewed the examination and initial interpretation  and I agree with the findings.    AMANDA TREJO MD            Consultations:   Neurology for a Stroke Code called in ED         Hospital Course by Problem:    Xochilt Trujillo is a 89 yo female w/ PMhx significant for HTN, prior CVA, frequent UTIs (adm 3/7-3/9 w/ UTI and AMS), and CAD (reported hx of MI) who presents from home with family complaining of altered mental status, found in ED have SBPs >200 which improved w/ nitro gtt. Now admitted to medicine service for further evaluation and management.      # Delirium on dementia:  Delirium likely due to medication use (was using Ativan 0.25 mg daily since 4/2016 and Ativan 0.5 mg daily and narcotics for a few days PTA). Family noticed worsening mental status on top of chronic memory deficits for a few days PTA. A stroke code was called in ED with negative CTOH and MRI brain (demonstrated chronic small vessel changes). Infectious workup was negative. CXR showed hazy bibasilar opacities, procal negative, UA neg, WBC is elevated to 11.6 -> 14.6 = > 11 (likely stress reaction in response to hypertensive urgency), and was afebrile. Ammonia negative, drug tox unremarkable. Since patient  endorsed abdominal pain and had tachycardia (HR 90s-100s) and leukocytosis during hospitalization, obtained CT abd/pelvis without contrast due to CKD and it was negative for any potential abdominal source of infection (diverticulitis or abscess). CT abd/pelvis was remarkable for AAA that 4.4 cm and it was 3.8 cm in 2013. Patient had intermittent delirium during hospitalization, likely due to being present in a foreign environment. On day of discharge, patient's mental status had improved, suggesting that delirium could have been due to benzodiazepine and/or recent narcotic use. Family opted not to have patient go to TCU and referral was placed for home RN, PT/OT, and HHA.  - Discontinued Ativan and advised against the use of narcotics    # Hypertension vs hypertensive urgency  Patient has history of hypertension but family noted that she was taken off antihypertensives a couple years ago due to presyncope. SBPs 190s-200s en route to ED. Amlodipine PO and nitro gtt initiated with improvements in pressures and patient was normotensive on arrival to the floor. The rapid improvement in BP with use of ntiro gtt for 30 minutes and no need for PRN labetalol for SBP>180/105 suggests that the elevated BP may due to delirium (stress reaction).  -- Was started on amlodipine 5 mg daily    # Muscle twitches   Was recently evaluated by Neurology and the muscle twitches were thought to be benign and she was prescribed small dose Ativan, which may have contributed to delirium.  - Discontinued Ativan  - Recommend followup with PCP/Neurology to discuss alternative treatment options for tremors         Physical Exam on day of Discharge:  Blood pressure 145/75, pulse 98, temperature 97.5  F (36.4  C), temperature source Oral, resp. rate 18, weight 44.2 kg (97 lb 6.4 oz), SpO2 97 %, not currently breastfeeding.  General: alert, oriented only to self and place, able to say that her birthday was coming up  HEENT: NC/AT, MMM  CV: mildly  tachycardic, regular rhythm, normal S1 and S2, soft CELESTINO at LSB  Resp: CTAB, no w/r/r  Abd: soft, NTND, BS present, no masses, no abdominal bruits  Extremities: no LE edema    Lines/Tubes:  None         Pending Results:   None         Discharge Medications:     Discharge Medication List as of 5/26/2017  5:37 PM      START taking these medications    Details   amLODIPine (NORVASC) 5 MG tablet Take 1 tablet (5 mg) by mouth daily, Disp-30 tablet, R-1, E-Prescribe         CONTINUE these medications which have NOT CHANGED    Details   aspirin 81 MG EC tablet Take 1 tablet (81 mg) by mouth daily, Disp-90 tablet, R-3, OTC      !! order for DME Equipment being ordered: First alert notification system.Disp-1 Device, R-0, Local Print      blood glucose test strip One touch Ultra test strips testing once daily, Disp-3 Box, R-1, E-Prescribe      !! ORDER FOR DME Equipment being ordered: walker with wheels and seatDisp-1 Device, R-0, Normal      ONE TOUCH DELICA LANCETS MISC Blood sugar check once daily, Disp-30 each, R-5, E-Prescribe       !! - Potential duplicate medications found. Please discuss with provider.      STOP taking these medications       LORazepam (ATIVAN) 0.5 MG tablet Comments:   Reason for Stopping:                    Discharge Instructions and Follow-Up:     Discharge Procedure Orders  Home care nursing referral   Referral Type: Home Health Therapies & Aides     Home Care PT Referral for Hospital Discharge   Referral Type: Home Health Therapies & Aides     Home Care OT Referral for Hospital Discharge     Reason for your hospital stay   Order Comments: Xochilt Trujillo was hospitalized with confusion and high blood pressure. Her confusion could be due to medication side effects (Ativan and opioids like oxycodone).     Adult [facility] Follow-up and recommended labs and tests   Order Comments: Follow up with primary care provider, Fran Haile, within 1-2 weeks, to evaluate medication change and for hospital  follow- up. No follow up labs or test are needed.     Appointments on [facility]or [facility] (with [facility] or [facility] provider or service). Call [phone] if you haven't heard regarding these appointments within 7 days of discharge.     Activity   Order Comments: Your activity upon discharge: activity as tolerated   Order Specific Question Answer Comments   Is discharge order? Yes      Discharge Instructions   Order Comments: 1. Follow up with primary care provider, Fran Haile, within 1-2 weeks, to evaluate medication change and for hospital follow-up. Discuss alternative options for hand tremors. You may also follow-up with Neurology to discuss alternative options for hand tremors.  2. Stop taking Ativan or any opioid pain medications (oxycodone, Percocet, Norco, etc.).  3. Start taking amlodipine 5 mg daily.     DNR/DNI     Diet   Order Comments: Follow this diet upon discharge:     Regular Diet Adult   Order Specific Question Answer Comments   Is discharge order? Yes                    Discharge Disposition:   Home with family         Condition on Discharge:   Discharge condition: Stable   Code status on discharge: DNR / DNI        Date of service: 5/27/2017    The patient was discussed with Dr. Lopez.    Karen Martinez MD, PhD  PGY-1 Internal Medicine  Pager: [phone]

## 2017-05-27 NOTE — PLAN OF CARE
Problem: Goal Outcome Summary  Goal: Goal Outcome Summary  Physical Therapy Discharge Summary     Reason for therapy discharge:    Discharged to home with home therapy.     Progress towards therapy goal(s). See goals on Care Plan in [facility] electronic health record for goal details.  Goals not met.  Barriers to achieving goals:   discharge on same date as initial evaluation.     Therapy recommendation(s):    Continued therapy is recommended.  Rationale/Recommendations:  Pt may benefit from additional skilled PT to address gait and safety..

## 2017-05-30 ENCOUNTER — CARE COORDINATION (OUTPATIENT)
Dept: CARE COORDINATION | Facility: CLINIC | Age: 82
End: 2017-05-30

## 2017-05-30 ENCOUNTER — CARE COORDINATION (OUTPATIENT)
Dept: CASE MANAGEMENT | Facility: CLINIC | Age: 82
End: 2017-05-30

## 2017-05-30 NOTE — PROGRESS NOTES
Clinic Care Coordination Contact  OUTREACH    Referral Information:  Referral Source: IP/TCU Report  Reason for Contact: Follow-up post hospital discharge  Care Conference: No     Universal Utilization:   ED Visits in last year: 3  Hospital visits in last year: 1  Last PCP appointment: 11/18/16  Missed Appointments: 0  Concerns: none  Multiple Providers or Specialists: YES    Clinical Concerns:  Current Medical Concerns:   Patient Active Problem List   Diagnosis     Iron deficiency anemia     Abdominal aneurysm (H)     Cerebral infarction (H)     Ovarian cancer (H)     Erosive gastritis     MI (myocardial infarction) (H)     Sigmoid diverticulitis     CKD (chronic kidney disease) stage 4, GFR 15-29 ml/min (H)     Diabetes mellitus type 2, uncomplicated (H)     Hypertension goal BP (blood pressure) < 140/90     Advanced directives, counseling/discussion     Health Care Home     Pseudophakia with Yag Caps, OU     PVD (posterior vitreous detachment) OU     Age-related osteoporosis without current pathological fracture     Dermatochalasis, unspecified laterality     Delirium     Gastrointestinal hemorrhage     Hyperlipidemia     Recurrent urinary tract infection     Muscle spasm     Myoclonic jerking     Senile dementia without behavioral disturbance     Hypertensive urgency       Current Behavioral Concerns: Delirium    Education Provided to patient: Care Coordination, Palliative Care, No upcoming appointments   Clinical Pathway: None    Medication Management:  Pt is adherent, Family assists     Functional Status:  Mobility Status: Independent w/Device  Equipment Currently Used at Home: walker, rolling, walker, standard  Transportation: Medical transportation, Family provides at times     Psychosocial:  Current living arrangement:: I live in a private home with family  Financial/Insurance: [facility], No concerns     Resources and Interventions:  Current Resources: Home Care  Advanced Care Plans/Directives on  file:: Yes    Patient/Caregiver understanding: Spoke with daughter who reports that a SN from home care is at their house at the time of the call. Pt is doing well since returning home. Pt's daughter expressed interest in palliative care and stated that the SN was going to discuss it with the home care team. Reviewed previous attempt to have patient accepted into hospice and the differences with the two programs. Encouraged patient's daughter to review My Chart inbox and to make a follow-up appointment with pt's PCP.   Frequency of Care Coordination: as needed  Upcoming appointment: 04/07/17     Plan: Follow-up in 1-2 weeks to check on home care status.     Val Boyer [facility]  Clinic Care Coordinator  [phone]

## 2017-05-30 NOTE — PROGRESS NOTES
Ms. Trujillo,    The urine analysis was not normal but there was not a clear indication of infection. Please let me know if you develop new signs or symptoms of infection.     Please contact the clinic if you have additional questions.  Thank you.    Sincerely,    Jaylan Haile MD

## 2017-05-30 NOTE — PROGRESS NOTES
"[facility]  \"Hello, my name is Lexi Henriquezb , and I am calling from the [facility].  I want to check in and see how you are doing, after leaving the hospital.  You may also receive a call from your Care Coordinator (care team), but I want to make sure you don t have any urgent needs.  I have a couple questions to review with you:     Post-Discharge Outreach                                                    Xochilt Trujillo is a 89 year old female         Care Team:    Patient Care Team       Relationship Specialty Notifications Start End    Fran Haile MD PCP - General Family Practice  5/23/17     Phone: [phone] Fax: [phone]         [address] [address]            Transition of Care Review                                                          Plan                                                      Thanks for your time.  Your Care Coordinator may follow-up within the next couple days.  In the meantime if you have questions, concerns or problems call your care team.        Lexi Collazo  "

## 2017-05-30 NOTE — LETTER
Health Care Home - Access Care Plan    About Me  Patient Name:  Xochilt Trujillo    YOB: 1924  Age:                            89 year old   Tanner MRN:         [id number] Telephone Information:     Home Phone [phone]   Mobile [phone]       Address:    [address] [place] [address]  [place] [address] Email address:  [email or web address]      Emergency Contact(s)  Name Relationship Lgl Grd Work Phone Home Phone Mobile Phone   FABIANA SALAZAR Daughter   [phone]   Jeff FERRO     [phone]             Health Maintenance: Routine Health maintenance Reviewed: Due/Overdue     My Access Plan  Medical Emergency 911   Questions or concerns during clinic hours Primary Clinic Line, I will call the clinic directly: Primary Clinic: [facility]- [phone]   24 Hour Appointment Line [phone] or  [phone])  (toll free)   24 Hour Nurse Line [phone] (toll free)   Questions or concerns outside clinic hours 24 Hour Appointment Line, I will call the after-hours on-call line:   [facility] [phone] or [phone]) (toll-free)   Preferred Urgent Care Preferred Urgent Care:  (NA)   Preferred Hospital Preferred Hospital: [place], [place]  [phone]   Preferred Pharmacy [facility] [address] [facility]     Behavioral Health Crisis Line Crisis Connection, [phone] or 911     My Care Team Members  Patient Care Team       Relationship Specialty Notifications Start End    Fran Haile MD PCP - General Family Practice  5/23/17     Phone: [phone] Fax: [phone]         [facility] [address] [facility] [address]        My Medical and Care Information  Problem List   Patient Active Problem List   Diagnosis     Iron deficiency anemia     Abdominal aneurysm (H)     Cerebral infarction (H)     Ovarian cancer (H)      Erosive gastritis     MI (myocardial infarction) (H)     Sigmoid diverticulitis     CKD (chronic kidney disease) stage 4, GFR 15-29 ml/min (H)     Diabetes mellitus type 2, uncomplicated (H)     Hypertension goal BP (blood pressure) < 140/90     Advanced directives, counseling/discussion     Health Care Home     Pseudophakia with Yag Caps, OU     PVD (posterior vitreous detachment) OU     Age-related osteoporosis without current pathological fracture     Dermatochalasis, unspecified laterality     Delirium     Gastrointestinal hemorrhage     Hyperlipidemia     Recurrent urinary tract infection     Muscle spasm     Myoclonic jerking     Senile dementia without behavioral disturbance     Hypertensive urgency      Current Medications and Allergies:  See printed Medication Report

## 2017-05-30 NOTE — LETTER
[facility]  Complex Care Plan  About Me  Patient Name:  Xochilt Trujillo    YOB: 1924  Age:   89 year old   Tanner MRN: [id number] Telephone Information:     Home Phone [phone]   Mobile [phone]       Address:    [address]  [place] [address] Email address:  [email or web address]      Emergency Contact(s)  Name Relationship Lgl Grd Work Phone Home Phone Mobile Phone   1. FABIANA CARPENTER Daughter   [phone]   2Ruth FERRO     [phone]           Primary language:  English     needed? No   [facility]:  [phone] op. 1  Other communication barriers: Yes, as documented (Cognitive Impairments)  Preferred Method of Communication:  Phone  Current living arrangement: I live in a private home with family  Mobility Status/ Medical Equipment: Independent w/Device  Other information to know about me:    Health Maintenance  Health Maintenance Reviewed: Due/Overdue     My Access Plan  Medical Emergency 911   Primary Clinic Line [facility]- [phone]   24 Hour Appointment Line [phone] or  [phone]) (toll-free)   24 Hour Nurse Line [phone] (toll-free)   Preferred Urgent Care  (NA)   Preferred Hospital [place], [place]  [phone]   Preferred Pharmacy [facility] [address] [facility]     Behavioral Health Crisis Line Crisis Connection, [phone] or 911     My Care Team Members  Patient Care Team       Relationship Specialty Notifications Start End    Fran Haile MD PCP - General Family Practice  5/23/17     Phone: [phone] Fax: [phone]         [facility] [address] [facility] [address]         My Care Plans  Self Management and Treatment Plan  Goals and (Comments)  Goal #1: I would like to avoid going to the hospital for the next few months.       10% of goal reached    Action  Plans on File: None  Advance Care Plans/Directives Type:   Type Advanced Care Plans/Directives: Advanced Directive - On File, DNR/DNI    My Medical and Care Information  Problem List   Patient Active Problem List   Diagnosis     Iron deficiency anemia     Abdominal aneurysm (H)     Cerebral infarction (H)     Ovarian cancer (H)     Erosive gastritis     MI (myocardial infarction) (H)     Sigmoid diverticulitis     CKD (chronic kidney disease) stage 4, GFR 15-29 ml/min (H)     Diabetes mellitus type 2, uncomplicated (H)     Hypertension goal BP (blood pressure) < 140/90     Advanced directives, counseling/discussion     Health Care Home     Pseudophakia with Yag Caps, OU     PVD (posterior vitreous detachment) OU     Age-related osteoporosis without current pathological fracture     Dermatochalasis, unspecified laterality     Delirium     Gastrointestinal hemorrhage     Hyperlipidemia     Recurrent urinary tract infection     Muscle spasm     Myoclonic jerking     Senile dementia without behavioral disturbance     Hypertensive urgency      Current Medications and Allergies:  See printed Medication Report.    Care Coordination Start Date: 06/01/17   Frequency of Care Coordination: as needed   Form Last Updated: 06/01/2017

## 2017-06-02 ENCOUNTER — CARE COORDINATION (OUTPATIENT)
Dept: CASE MANAGEMENT | Facility: CLINIC | Age: 82
End: 2017-06-02

## 2017-06-02 NOTE — PROGRESS NOTES
Clinic Care Coordination Contact  Care Team Conversations    Notification received that patient DC d from hospital on 5/26 to home with [facility].     This writer sent a fax to [facility], asking to be notified when the patient has discharged from home care and if they need assistance from a Care Coordinator.     This writer will follow up in about a month to check the patient's status.       Carisa Conley

## 2017-06-09 PROBLEM — Z51.5 HOSPICE CARE PATIENT: Status: ACTIVE | Noted: 2017-06-09

## 2017-06-13 ENCOUNTER — TELEPHONE (OUTPATIENT)
Dept: FAMILY MEDICINE | Facility: CLINIC | Age: 82
End: 2017-06-13

## 2017-06-13 NOTE — TELEPHONE ENCOUNTER
Hospice Update-No Response Required  ADMISSION SUMMARY NOTE BY: Kate Soriano RN  PRIMARY PROVIDER: Dr. Fran Haile  PRIMARY DX: Cerebrovascular disease  CO-MORBIDITIES: Chronic kideny failure, Peripheral vascular disease, HTN, CVA, frequent UTI, CAD, MI, diabetes, abdominal aneurysm(3-5cm).  POLST: DNR/DNI  CURRENT STATUS: KPS 40, Fast 6E, Fall risk 8.  10% weight loss over past year.  Pt requiring increased assist with ADLs including dressing, bathing and ambulation.  Increased weakness.  2 hospitalizations over past 3 months, recurrent UTIs and hypertension d/t CKD.  Increased dsypnea with activity.  Worsening confusion.  MED CHANGES: Started ativan, morphine, atropine, tylenol, ibuprofen, senna-s  PATIENT GOALS: To stay out of the hospital.  FAMILY GOALS: To have pt be comfortable and happy.  SMART GOAL: To have less spasms in legs.  PLAN FOR NEXT 2 WEEKS: SNV 1x per week for symptom management, pain control, EOL education/support. HHA 3x/week for 1H for skin care, ADL support.  Massage to eval.  Volunteer and  as requested.

## 2017-06-20 ENCOUNTER — TELEPHONE (OUTPATIENT)
Dept: FAMILY MEDICINE | Facility: CLINIC | Age: 82
End: 2017-06-20

## 2017-06-20 NOTE — TELEPHONE ENCOUNTER
Hospice Update-No Response Required  IDT NOTE BY: Demetrice Chicas, RN  PRIMARY PROVIDER: Dr. Fran Haile  PRIMARY DX: Cerebrovascular disease  CO-MORBIDITIES: Chronic kideny failure, Peripheral vascular disease, HTN, CVA, frequent UTI, CAD, MI, diabetes, abdominal aneurysm(3-5cm).  POLST: DNR/DNI  CHANGES IN STATUS SINCE LAST IDT: Client alert with intermittent confusion/disorientation. Ongoing weakness. Ambulating with 4WW in home and using W/C for outings. Managing sx's of indigestion with TUMS PRN.   MED CHANGES: Ibuprofen and Benadryl d/c'd due to non use.  PATIENT GOALS: To stay out of the hospital.  FAMILY GOALS: To have pt be comfortable and happy.  SMART GOAL: To have less spasms in legs.  PLAN FOR NEXT 2 WEEKS: SNV 1x per week for symptom management, pain control, EOL education/support. HHA 3x/week ADL support.  Massage to eval.  Volunteer and  as requested

## 2017-06-23 PROCEDURE — 87088 URINE BACTERIA CULTURE: CPT | Performed by: FAMILY MEDICINE

## 2017-06-23 PROCEDURE — 87086 URINE CULTURE/COLONY COUNT: CPT | Performed by: FAMILY MEDICINE

## 2017-06-23 PROCEDURE — 87186 SC STD MICRODIL/AGAR DIL: CPT | Performed by: FAMILY MEDICINE

## 2017-06-26 LAB
BACTERIA SPEC CULT: ABNORMAL
MICRO REPORT STATUS: ABNORMAL
MICROORGANISM SPEC CULT: ABNORMAL
MICROORGANISM SPEC CULT: ABNORMAL
SPECIMEN SOURCE: ABNORMAL

## 2017-06-28 ENCOUNTER — CARE COORDINATION (OUTPATIENT)
Dept: CASE MANAGEMENT | Facility: CLINIC | Age: 82
End: 2017-06-28

## 2017-06-28 NOTE — PROGRESS NOTES
Clinic Care Coordination Contact    Situation: Patient chart reviewed by care coordinator.    Background: CC was working with patient and family on community support services.    Assessment: Pt is now enrolled in [facility]    Plan/Recommendations: Pt will rely on Hospice team for guidance on health care needs and symptom management. No further outreaches will be made at this time unless a new referral is made or a change in the pt's status occurs. Patient was provided with this writer's contact information and encouraged to call with any questions or concerns.    ANGÉLICA Helton  [facility]   [phone]  [email or web address]

## 2017-07-03 ENCOUNTER — TELEPHONE (OUTPATIENT)
Dept: FAMILY MEDICINE | Facility: CLINIC | Age: 82
End: 2017-07-03

## 2017-07-03 NOTE — TELEPHONE ENCOUNTER
Hospice Update-No Response Required  IDT NOTE BY: Demetrice Chicas, RN  PRIMARY PROVIDER: Dr. Fran Haile  PRIMARY DX: Cerebrovascular disease  CO-MORBIDITIES: Chronic kidney failure, Peripheral vascular disease, HTN, CVA, frequent UTI, CAD, MI, diabetes, abdominal aneurysm(3-5cm).  POLST: DNR/DNI  CHANGES IN STATUS SINCE LAST IDT: Client alert with intermittent confusion/disorientation. Ongoing weakness. Recent episodes of elevated BP treated with PRN Amlodipine. Managing sx's of indigestion with TUMS PRN and recently 20mg of omeprazole scheduled QD. Recent positive UC-UTI treated with Macrobid 100mg BID x 7days.   MED CHANGES: Omeprazole 20mg QD. Macrobid 100mg BID x 7 days.   PATIENT GOALS: To stay out of the hospital.  FAMILY GOALS: To have pt be comfortable and happy.  SMART GOAL: To have less spasms in legs.  PLAN FOR NEXT 2 WEEKS: SNV 1x per week for symptom management, pain control, EOL education/support. HHA 3x/week for ADL support.  Massage to eval.  Volunteer and  as requested.

## 2017-07-27 ENCOUNTER — HOSPITAL ENCOUNTER (INPATIENT)
Facility: CLINIC | Age: 82
LOS: 4 days | Discharge: HOSPICE | DRG: 951 | End: 2017-07-31
Attending: FAMILY MEDICINE | Admitting: FAMILY MEDICINE
Payer: COMMERCIAL

## 2017-07-27 PROBLEM — Z51.5: Status: ACTIVE | Noted: 2017-07-27

## 2017-07-27 PROCEDURE — 25000132 ZZH RX MED GY IP 250 OP 250 PS 637: Performed by: FAMILY MEDICINE

## 2017-07-27 PROCEDURE — 12000000 ZZH R&B MED SURG/OB

## 2017-07-27 RX ORDER — AMOXICILLIN 250 MG
2 CAPSULE ORAL DAILY PRN
Status: DISCONTINUED | OUTPATIENT
Start: 2017-07-27 | End: 2017-07-31 | Stop reason: HOSPADM

## 2017-07-27 RX ORDER — LORAZEPAM 0.5 MG/1
0.5 TABLET ORAL
Status: DISCONTINUED | OUTPATIENT
Start: 2017-07-27 | End: 2017-07-31 | Stop reason: HOSPADM

## 2017-07-27 RX ORDER — ATROPINE SULFATE 10 MG/ML
2-4 SOLUTION/ DROPS OPHTHALMIC
Status: DISCONTINUED | OUTPATIENT
Start: 2017-07-27 | End: 2017-07-31 | Stop reason: HOSPADM

## 2017-07-27 RX ORDER — MORPHINE SULFATE 20 MG/ML
5 SOLUTION ORAL
Status: DISCONTINUED | OUTPATIENT
Start: 2017-07-27 | End: 2017-07-31 | Stop reason: HOSPADM

## 2017-07-27 RX ORDER — CALCIUM CARBONATE 500 MG/1
1000 TABLET, CHEWABLE ORAL 4 TIMES DAILY PRN
Status: DISCONTINUED | OUTPATIENT
Start: 2017-07-27 | End: 2017-07-31 | Stop reason: HOSPADM

## 2017-07-27 RX ORDER — AMLODIPINE BESYLATE 5 MG/1
5 TABLET ORAL DAILY
Status: DISCONTINUED | OUTPATIENT
Start: 2017-07-27 | End: 2017-07-31 | Stop reason: HOSPADM

## 2017-07-27 RX ORDER — CALCIUM CARBONATE 500 MG/1
1-2 TABLET, CHEWABLE ORAL 4 TIMES DAILY PRN
COMMUNITY
End: 2019-01-01

## 2017-07-27 RX ORDER — AMOXICILLIN 250 MG
2 CAPSULE ORAL 2 TIMES DAILY PRN
COMMUNITY

## 2017-07-27 RX ORDER — CALCIUM CARBONATE 500 MG/1
500-1000 TABLET, CHEWABLE ORAL 4 TIMES DAILY PRN
Status: DISCONTINUED | OUTPATIENT
Start: 2017-07-27 | End: 2017-07-27

## 2017-07-27 RX ORDER — NALOXONE HYDROCHLORIDE 0.4 MG/ML
.1-.4 INJECTION, SOLUTION INTRAMUSCULAR; INTRAVENOUS; SUBCUTANEOUS
Status: DISCONTINUED | OUTPATIENT
Start: 2017-07-27 | End: 2017-07-31 | Stop reason: HOSPADM

## 2017-07-27 RX ORDER — ACETAMINOPHEN 325 MG/1
650 TABLET ORAL EVERY 4 HOURS PRN
Status: DISCONTINUED | OUTPATIENT
Start: 2017-07-27 | End: 2017-07-31 | Stop reason: HOSPADM

## 2017-07-27 RX ORDER — ONDANSETRON 2 MG/ML
4 INJECTION INTRAMUSCULAR; INTRAVENOUS EVERY 6 HOURS PRN
Status: DISCONTINUED | OUTPATIENT
Start: 2017-07-27 | End: 2017-07-31 | Stop reason: HOSPADM

## 2017-07-27 RX ORDER — PROCHLORPERAZINE 25 MG
12.5 SUPPOSITORY, RECTAL RECTAL EVERY 12 HOURS PRN
Status: DISCONTINUED | OUTPATIENT
Start: 2017-07-27 | End: 2017-07-31 | Stop reason: HOSPADM

## 2017-07-27 RX ORDER — ONDANSETRON 4 MG/1
4 TABLET, ORALLY DISINTEGRATING ORAL EVERY 6 HOURS PRN
Status: DISCONTINUED | OUTPATIENT
Start: 2017-07-27 | End: 2017-07-31 | Stop reason: HOSPADM

## 2017-07-27 RX ORDER — METOCLOPRAMIDE 5 MG/1
5 TABLET ORAL EVERY 6 HOURS PRN
Status: DISCONTINUED | OUTPATIENT
Start: 2017-07-27 | End: 2017-07-31 | Stop reason: HOSPADM

## 2017-07-27 RX ORDER — PROCHLORPERAZINE MALEATE 5 MG
5 TABLET ORAL EVERY 6 HOURS PRN
Status: DISCONTINUED | OUTPATIENT
Start: 2017-07-27 | End: 2017-07-31 | Stop reason: HOSPADM

## 2017-07-27 RX ORDER — METOCLOPRAMIDE HYDROCHLORIDE 5 MG/ML
5 INJECTION INTRAMUSCULAR; INTRAVENOUS EVERY 6 HOURS PRN
Status: DISCONTINUED | OUTPATIENT
Start: 2017-07-27 | End: 2017-07-31 | Stop reason: HOSPADM

## 2017-07-27 RX ORDER — MORPHINE SULFATE 20 MG/ML
5 SOLUTION ORAL
COMMUNITY
End: 2019-01-01

## 2017-07-27 RX ORDER — ASPIRIN 81 MG/1
81 TABLET ORAL DAILY
Status: DISCONTINUED | OUTPATIENT
Start: 2017-07-27 | End: 2017-07-31 | Stop reason: HOSPADM

## 2017-07-27 RX ORDER — ATROPINE SULFATE 10 MG/ML
2-4 SOLUTION/ DROPS OPHTHALMIC
COMMUNITY
End: 2017-09-05

## 2017-07-27 RX ORDER — LORAZEPAM 0.5 MG/1
0.25 TABLET ORAL AT BEDTIME
Status: DISCONTINUED | OUTPATIENT
Start: 2017-07-27 | End: 2017-07-31 | Stop reason: HOSPADM

## 2017-07-27 RX ADMIN — CALCIUM CARBONATE (ANTACID) CHEW TAB 500 MG 1000 MG: 500 CHEW TAB at 20:34

## 2017-07-27 RX ADMIN — LORAZEPAM 0.25 MG: 0.5 TABLET ORAL at 21:53

## 2017-07-27 NOTE — PROGRESS NOTES
Following nursing/hospice note entered by Khloe Chicas RN, [facility]. Accessed to [facility] under Kehinde Cordon RN:    Client admitted for respite stay. Client pleasant, cooperative, and relaxed this visit. Daughter Dawna reports no concerns upon admit. Skilled nursing visits will be made daily for check in. [facility] to be contacted at [phone] for questions, concerns, or with changes in condition.        Demetrice Chicas RN

## 2017-07-27 NOTE — PROGRESS NOTES
Hospice Visit Note SW (By Aury Ramírez) Met patient at hospital for respite visit. Patient's mood pleasant. She denied pain and sadness today. She stated she has general anxieties about family members and their safety. MSW asked about coping. She states that she usually attempts to alleviate the fear with brainstorming and then can fall back to sleep. MSW provided education regarding relaxation techniques. Patient forgetful AEB forgetting children's names, quick change in thought and linear thinking. She expressed understanding that she will stay at hospital until 7/31 and will return home.

## 2017-07-28 PROCEDURE — 12000000 ZZH R&B MED SURG/OB

## 2017-07-28 RX ADMIN — Medication 20 MG: at 08:19

## 2017-07-28 RX ADMIN — LORAZEPAM 0.25 MG: 0.5 TABLET ORAL at 23:46

## 2017-07-28 RX ADMIN — Medication 500 MG: at 08:19

## 2017-07-28 RX ADMIN — AMLODIPINE BESYLATE 5 MG: 5 TABLET ORAL at 08:19

## 2017-07-28 RX ADMIN — ASPIRIN 81 MG: 81 TABLET ORAL at 08:20

## 2017-07-28 RX ADMIN — ACETAMINOPHEN 650 MG: 325 TABLET ORAL at 08:18

## 2017-07-28 NOTE — PROGRESS NOTES
At bedtime patient woke from a nap feeing confused, forgetful to time/place/situation. Reoriented. Patient remembers her daughter being out of town and her daughter knows she is here. Familiar objects within reach. Reviewed call light. Bed alarm active. Rest encouraged.

## 2017-07-28 NOTE — PROGRESS NOTES
SPIRITUAL HEALTH SERVICES  SPIRITUAL ASSESSMENT Progress Note   Hospital Location [facility]      PRIMARY FOCUS:     Emotional/spiritual/Yazdanism distress    Support for coping    ILLNESS CIRCUMSTANCES:   Reviewed documentation. Reflective conversation shared with Xochilt which integrated elements of illness and family narratives.     Context of Serious Illness/Symptom(s) - issue with her legs giving out is what she indicated to me.    Resources for Support - family is providing for her needs.    DISTRESS:     Emotional/Existential/Relational Distress - Xochilt wanted to go home. She is distressed that she can not get up and leave and go back to her home where she finds meaning in life.      PLAN: remain available at her request.                                                                                                                                               Blaise Napoles MA.  Associate Staff   Pager [phone]

## 2017-07-28 NOTE — PROGRESS NOTES
Patient in recliner chair, reading and watching TV. Re-educated on use of call light, verbalizes understanding. No complaints of pain at this time, no needs. Would like to nap for a little bit before getting in bed for the night.

## 2017-07-28 NOTE — PLAN OF CARE
Problem: Goal Outcome Summary  Goal: Goal Outcome Summary  Outcome: No Change  Pt alert and oriented x3 with forgetfulness. Pt needs reminders on how to use call light. Good appetite. Up with SBA with wheeled walker. Continent of bowel and bladder.

## 2017-07-28 NOTE — PLAN OF CARE
Problem: Discharge Planning  Goal: Discharge Planning (Adult, OB, Behavioral, Peds)  Outcome: Improving  Sat in chair most of the day.  Eating and voiding.  No BM today.  Has not required anything for pain,but did give tylenol this am.  Likes legs elevated in recliner for comfort.  Hospice aide and nurse here, they walked patient.  Family/friend also visited.

## 2017-07-28 NOTE — PROGRESS NOTES
Pt complaining of feeling nauseous and some lower back pain. Tums given, assisted to bathroom with walker, changed into pajamas, hearing aid at bedside and then to bed with pillows propping her. Patient starting to feel better with Tums and loose clothing. Patient would like to sleep with a light on in the room, TV turned to channel she typically watches. Understands to call for needs, will reassess to see if further meds need for abd discomfort/nausea.

## 2017-07-28 NOTE — PLAN OF CARE
Problem: Goal Outcome Summary  Goal: Goal Outcome Summary  Outcome: No Change  Patient VSS during evening. At times is confused to place/situation/time, does well with reorienting. Forgets to use call light, bed alarm set. Tolerating regular diet. Her home medications are locked between administrations. She moves well around room with assist x1 and walker. Incontinence diaper in place. Hearing aid and glasses at bedside. Very sweet patient.

## 2017-07-28 NOTE — PROGRESS NOTES
SW note. Hospice SW here to see pt. DC plan is that pt will return home on 7/31/17. She will receive daily visit from hospice.Angélica BURNETT, [facility], [phone]

## 2017-07-29 PROCEDURE — 12000000 ZZH R&B MED SURG/OB

## 2017-07-29 RX ADMIN — ASPIRIN 81 MG: 81 TABLET ORAL at 07:43

## 2017-07-29 RX ADMIN — LORAZEPAM 0.25 MG: 0.5 TABLET ORAL at 22:25

## 2017-07-29 RX ADMIN — ACETAMINOPHEN 650 MG: 325 TABLET ORAL at 13:33

## 2017-07-29 RX ADMIN — AMLODIPINE BESYLATE 5 MG: 5 TABLET ORAL at 07:43

## 2017-07-29 RX ADMIN — Medication 500 MG: at 07:43

## 2017-07-29 RX ADMIN — ACETAMINOPHEN 650 MG: 325 TABLET ORAL at 07:42

## 2017-07-29 RX ADMIN — Medication 20 MG: at 07:43

## 2017-07-29 NOTE — PROGRESS NOTES
SNV to hospice clt today in respite room. Client observed to be disoriented to place stating she knows she is in hospital , but wants to get back to her home in [place] and then later stated [place]. She complains of R leg pain since walking earlier with HHA with APAP given as prescribed per hospital RN, which Xochilt states she has had before with relief. PAINAD scale rates her current pain as 1/0-10. Pulse 94 and regular, respirations of 18, lungs clear bilaterally upon auscultation, blood pressure of 104/56 noted. No edema noted. Client observed to eat 50% of noon meal. Last Bm reported as 7-27-17 with stool softener to be given this PM per hospital RN if no BM. No falls reported with clt walking with walker with assistance of one. Plan is for clt to return to daughter's home with private pay assistance to be put in place. SNV daily and HHA assistance per Hospice POC through respite stay.

## 2017-07-29 NOTE — PLAN OF CARE
Problem: Goal Outcome Summary  Goal: Goal Outcome Summary  Outcome: Improving  No c/o of pain , sat in chair for dinner.  Uses call light to make her needs known.

## 2017-07-29 NOTE — PLAN OF CARE
Problem: Individualization  Goal: Patient Preferences  Outcome: No Change  Pt up and down during night. Sets off tabs and BA, often to go to BR. Walks w/ one assist. Confused at times, fairly clear at other times.

## 2017-07-29 NOTE — PLAN OF CARE
Problem: Goal Outcome Summary  Goal: Goal Outcome Summary  Outcome: Improving     Medicated with tylenol x 2 today.  Walked halls with hospital staff x 2 today.  Eating fair amount of meals.  Alarms on for safety.

## 2017-07-30 PROCEDURE — 12000000 ZZH R&B MED SURG/OB

## 2017-07-30 RX ADMIN — Medication 500 MG: at 07:29

## 2017-07-30 RX ADMIN — ACETAMINOPHEN 650 MG: 325 TABLET ORAL at 17:29

## 2017-07-30 RX ADMIN — ACETAMINOPHEN 650 MG: 325 TABLET ORAL at 07:25

## 2017-07-30 RX ADMIN — Medication 20 MG: at 07:29

## 2017-07-30 RX ADMIN — ASPIRIN 81 MG: 81 TABLET ORAL at 07:29

## 2017-07-30 RX ADMIN — AMLODIPINE BESYLATE 5 MG: 5 TABLET ORAL at 07:25

## 2017-07-30 NOTE — PLAN OF CARE
"Problem: Goal Outcome Summary  Goal: Goal Outcome Summary  Outcome: Improving  Less confusion today.  She states she talked with her daughter today and knows she is in a safe place and really likes our staff.  She reports she is no longer afraid that \"we will hurt her here.\"  Setting off alarms at times, easily redirectable.  Eating good amounts of meals.  Walked halls with hospital staff and hospice staff.  Appears comfortable and received tylenol this am.  Had bowel movement during hospital visit.  Provided her with \"busy\" activities from the adult busy box.      "

## 2017-07-30 NOTE — PLAN OF CARE
Problem: Goal Outcome Summary  Goal: Goal Outcome Summary  Pt confused at HS, expressed worry that people would be mean to her children, has slept soundly than all night. Denied pain.

## 2017-07-31 VITALS
RESPIRATION RATE: 18 BRPM | TEMPERATURE: 98.2 F | SYSTOLIC BLOOD PRESSURE: 112 MMHG | OXYGEN SATURATION: 96 % | DIASTOLIC BLOOD PRESSURE: 79 MMHG | HEART RATE: 113 BPM

## 2017-07-31 RX ADMIN — AMLODIPINE BESYLATE 5 MG: 5 TABLET ORAL at 09:48

## 2017-07-31 RX ADMIN — ASPIRIN 81 MG: 81 TABLET ORAL at 09:49

## 2017-07-31 RX ADMIN — LORAZEPAM 0.25 MG: 0.5 TABLET ORAL at 04:09

## 2017-07-31 RX ADMIN — ACETAMINOPHEN 650 MG: 325 TABLET ORAL at 09:48

## 2017-07-31 RX ADMIN — Medication 500 MG: at 09:48

## 2017-07-31 RX ADMIN — Medication 20 MG: at 09:49

## 2017-07-31 NOTE — PLAN OF CARE
Problem: Discharge Planning  Goal: Discharge Planning (Adult, OB, Behavioral, Peds)  [place] DISCHARGE NOTE     Patient discharged to home at 12:44 PM via wheel chair. Accompanied by daughter and staff. Discharge instructions reviewed with daughter, opportunity offered to ask questions. Prescriptions - None ordered for discharge. All belongings sent with patient.     Nisreen Cabrera

## 2017-07-31 NOTE — PLAN OF CARE
Problem: Goal Outcome Summary  Goal: Goal Outcome Summary  Outcome: No Change  Pt much less fearful, slept until 0400 prior to receiving her hs Ativan, did not awaken to give. She did wake to go to BR at 0400, she was jirky in hands and arms, stated that she gets spasms at times and then it passes, she is not a reliable historian but nurse waited in room distracting with other questions to see if it would resolve, jirking did not resolve, discussed w/ charge nurse and decided to given her missed dose of Ativan, she was asleep and not jirking within 10 minutes.

## 2017-08-01 ENCOUNTER — TELEPHONE (OUTPATIENT)
Dept: FAMILY MEDICINE | Facility: CLINIC | Age: 82
End: 2017-08-01

## 2017-08-01 NOTE — TELEPHONE ENCOUNTER
Hospice Update-No Response Required  IDT NOTE BY: Demetrice Chicas, RN  PRIMARY PROVIDER: Dr. Fran Haile  PRIMARY DX: Cerebrovascular disease  CO-MORBIDITIES: Chronic kidney failure, Peripheral vascular disease, HTN, CVA, frequent UTI, CAD, MI, diabetes, abdominal aneurysm(3-5cm).  POLST: DNR/DNI  CHANGES IN STATUS SINCE LAST IDT: Client alert with ongoing confusion/disorientation and is frequently anxious-managed with prn lorazepam. Client on respite stay from 7/27/17-7/31/17 at [facility]-received daily nursing visits during this time.  MED CHANGES: None   PATIENT GOALS: To stay out of the hospital.  FAMILY GOALS: To have pt be comfortable and happy.  SMART GOAL: To have less spasms in legs.  PLAN FOR NEXT 2 WEEKS: SNV 1x per week for symptom management, pain control, EOL education/support. HHA 3x/week for ADL support.  Massage therapy. Volunteer and  as requested.

## 2017-08-15 ENCOUNTER — TELEPHONE (OUTPATIENT)
Dept: FAMILY MEDICINE | Facility: CLINIC | Age: 82
End: 2017-08-15

## 2017-08-15 NOTE — TELEPHONE ENCOUNTER
Hospice Update-No Response Required  IDT NOTE BY: Demetrice Chicas, RN  PRIMARY PROVIDER: Dr. Fran Haile  PRIMARY DX: Cerebrovascular disease  CO-MORBIDITIES: Chronic kidney failure, Peripheral vascular disease, HTN, CVA, frequent UTI, CAD, MI, diabetes, abdominal aneurysm(3-5cm).  POLST: DNR/DNI  CHANGES IN STATUS SINCE LAST IDT: 89 year old female residing in own home with daughter. Client alert with ongoing confusion/disorientation. Requires minimal assist for ADL's. Intake is good-able to feed self after meal prep and setup. Ongoing anxiety-currently being treated with prn lorazepam.   MED CHANGES: None   PATIENT GOALS: To stay out of the hospital.  FAMILY GOALS: To have pt be comfortable and happy.  SMART GOAL: To have less spasms in legs.  PLAN FOR NEXT 2 WEEKS: SNV 1x per week for symptom management, pain control, EOL education/support. HHA 3x/week for ADL support.  Massage therapy. Volunteer and  as requested.

## 2017-08-29 ENCOUNTER — TELEPHONE (OUTPATIENT)
Dept: FAMILY MEDICINE | Facility: CLINIC | Age: 82
End: 2017-08-29

## 2017-08-29 NOTE — TELEPHONE ENCOUNTER
Hospice Update-No Response Required  RECERTIFICATION NOTE BY: Demetrice Chicas, RN  PRIMARY PROVIDER: Dr. Fran Haile  PRIMARY DX: Cerebrovascular disease  CO-MORBIDITIES: Chronic kidney failure, Peripheral vascular disease, HTN, CVA, frequent UTI, CAD, MI, diabetes, abdominal aneurysm(3-5cm).  POLST: DNR/DNI  RECERTIFICATION NOTE: 89 year old female residing in own home with daughter. Client alert with worsening confusion/disorientation-difficulty recognizing family members. Increased assistance required for ADL's. Intake fair-appetite declining-losing interest in food. Able to feed self after meal prep and setup. SHANITA circumference-21.5cm-unchanged. Weight-103.4-up however client has new onset of edema in BLE's. Onset of intermittent SOB at rest and dyspnea upon minimal exertion. Upper abd/epigastric pain-increase in omeprazole and prn tylenol not effective. Encouraging initiation of prn morphine. Increased incontinence-difficulty managing mechanics of toileting-forgetting how to use toilet and dismantling pullups. Ongoing anxiety-currently being treated with prn lorazepam. KPS-40. FAST-6e  MED CHANGES: Omeprazole increased to 20mg BID.   PATIENT GOALS: To stay out of the hospital.  FAMILY GOALS: To have pt be comfortable and happy.  SMART GOAL: To have less spasms in legs.  PLAN FOR NEXT 2 WEEKS: SNV 1x per week for symptom management, pain control, EOL education/support. HHA 3x/week for ADL support.  Massage therapy. Volunteer and  as requested.

## 2017-09-05 ENCOUNTER — OFFICE VISIT (OUTPATIENT)
Dept: FAMILY MEDICINE | Facility: CLINIC | Age: 82
End: 2017-09-05
Payer: MEDICARE

## 2017-09-05 VITALS — SYSTOLIC BLOOD PRESSURE: 124 MMHG | DIASTOLIC BLOOD PRESSURE: 70 MMHG | HEART RATE: 74 BPM | TEMPERATURE: 97.6 F

## 2017-09-05 DIAGNOSIS — R10.84 ABDOMINAL PAIN, GENERALIZED: Primary | ICD-10-CM

## 2017-09-05 PROCEDURE — 99214 OFFICE O/P EST MOD 30 MIN: CPT | Mod: GW | Performed by: FAMILY MEDICINE

## 2017-09-05 RX ORDER — SUCRALFATE ORAL 1 G/10ML
1 SUSPENSION ORAL 4 TIMES DAILY
Qty: 420 ML | Refills: 1 | Status: SHIPPED | OUTPATIENT
Start: 2017-09-05 | End: 2017-12-22

## 2017-09-05 NOTE — NURSING NOTE
"Chief Complaint   Patient presents with     Abdominal Pain       Initial /70  Pulse 74  Temp 97.6  F (36.4  C) (Tympanic) Estimated body mass index is 18.4 kg/(m^2) as calculated from the following:    Height as of 4/7/17: 5' 1\" (1.549 m).    Weight as of 5/26/17: 97 lb 6.4 oz (44.2 kg).  Medication Reconciliation: complete  "

## 2017-09-05 NOTE — MR AVS SNAPSHOT
After Visit Summary   9/5/2017    Xochilt Trujillo    MRN: [id number]           Patient Information     Date Of Birth          5/28/1924        Visit Information        Provider Department      9/5/2017 2:20 PM Sharon Brown MD [facility]        Today's Diagnoses     Abdominal pain, generalized    -  1      Care Instructions    *   Not sure.     *   Try Carafate, 4 times per day. This may help. Give it 2 weeks.     *   Keep us updated.           Follow-ups after your visit        Who to contact     Normal or non-critical lab and imaging results will be communicated to you by ExecMobilehart, letter or phone within 4 business days after the clinic has received the results. If you do not hear from us within 7 days, please contact the clinic through ExecMobilehart or phone. If you have a critical or abnormal lab result, we will notify you by phone as soon as possible.  Submit refill requests through [employer] Mobile CRM or call your pharmacy and they will forward the refill request to us. Please allow 3 business days for your refill to be completed.          If you need to speak with a  for additional information , please call: [phone]           Additional Information About Your Visit        MyCharBountysource Information     [employer] Mobile CRM gives you secure access to your electronic health record. If you see a primary care provider, you can also send messages to your care team and make appointments. If you have questions, please call your primary care clinic.  If you do not have a primary care provider, please call [phone] and they will assist you.        Care EveryWhere ID     This is your Care EveryWhere ID. This could be used by other organizations to access your [place] medical records  [id number]        Your Vitals Were     Pulse Temperature                74 97.6  F (36.4  C) (Tympanic)           Blood Pressure from Last 3 Encounters:   09/05/17 124/70   07/31/17 112/79   05/26/17 145/75     Weight from Last 3 Encounters:   05/26/17 97 lb 6.4 oz (44.2 kg)   04/07/17 101 lb 4.8 oz (45.9 kg)   03/15/17 99 lb 12.8 oz (45.3 kg)              Today, you had the following     No orders found for display         Today's Medication Changes          These changes are accurate as of: 9/5/17  2:54 PM.  If you have any questions, ask your nurse or doctor.               Start taking these medicines.        Dose/Directions    sucralfate 1 GM/10ML suspension   Commonly known as:  CARAFATE   Used for:  Abdominal pain, generalized   Started by:  Sharon Brown MD        Dose:  1 g   Take 10 mLs (1 g) by mouth 4 times daily   Quantity:  420 mL   Refills:  1         These medicines have changed or have updated prescriptions.        Dose/Directions    amLODIPine 5 MG tablet   Commonly known as:  NORVASC   This may have changed:  additional instructions   Used for:  Hypertensive urgency        Dose:  5 mg   Take 1 tablet (5 mg) by mouth daily   Quantity:  30 tablet   Refills:  1       ATIVAN PO   This may have changed:  Another medication with the same name was removed. Continue taking this medication, and follow the directions you see here.        Dose:  0.5 mg   Take 0.5 mg by mouth every hour as needed for anxiety or muscle spasms (restlessness or agitation)   Refills:  0         Stop taking these medicines if you haven't already. Please contact your care team if you have questions.     aspirin 81 MG EC tablet   Stopped by:  Sharon Brown MD           atropine 1 % ophthalmic solution   Stopped by:  Sharon Brown MD                Where to get your medicines      These medications were sent to [facility] [address]  [address] [address]     Phone:  [phone]     sucralfate 1 GM/10ML suspension                Primary Care Provider Office Phone # Fax #    Fran Haile [phone] [address]  [address]        Equal Access to Services     [facility]: Hadii gudelia chan mian Raphael, waarmandoda luqgareth, qatiesha millerrodneyteo olivas, khalida henry. So [facility] [phone].    ATENCIÓN: Si habla español, tiene a mendoza disposición servicios gratuitos de asistencia lingüística. Kelley al [phone].    We comply with applicable federal civil rights laws and [place] laws. We do not discriminate on the basis of race, color, national origin, age, disability sex, sexual orientation or gender identity.            Thank you!     Thank you for choosing [facility]  for your care. Our goal is always to provide you with excellent care. Hearing back from our patients is one way we can continue to improve our services. Please take a few minutes to complete the written survey that you may receive in the mail after your visit with us. Thank you!             Your Updated Medication List - Protect others around you: Learn how to safely use, store and throw away your medicines at www.disposemymeds.org.          This list is accurate as of: 9/5/17  2:54 PM.  Always use your most recent med list.                   Brand Name Dispense Instructions for use Diagnosis    amLODIPine 5 MG tablet    NORVASC    30 tablet    Take 1 tablet (5 mg) by mouth daily    Hypertensive urgency       ATIVAN PO      Take 0.5 mg by mouth every hour as needed for anxiety or muscle spasms (restlessness or agitation)        calcium carbonate 500 MG chewable tablet    TUMS     Take 1-2 chew tab by mouth 4 times daily as needed for heartburn        Cranberry 500 MG Tabs      Take 500 mg by mouth daily        morphine sulfate HIGH CONCENTRATE 100 MG/5ML (HIGH CONC) solution    ROXANOL *CONCENTRATED*     Take 5 mg by mouth every hour as needed for shortness of breath / dyspnea, moderate to severe pain or pain        OMEPRAZOLE PO      Take 20 mg by mouth every morning        * order for DME     1 Device    Equipment being  ordered: walker with wheels and seat    Unsteady gait       * order for DME     1 Device    Equipment being ordered: First alert notification system.    Routine general medical examination at a health care facility, CKD (chronic kidney disease) stage 4, GFR 15-29 ml/min (H), Type 2 diabetes mellitus without complication, without long-term current use of insulin (H), Abdominal aortic aneurysm without rupture (H)       senna-docusate 8.6-50 MG per tablet    SENOKOT-S;PERICOLACE     Take 2 tablets by mouth daily as needed for constipation        sucralfate 1 GM/10ML suspension    CARAFATE    420 mL    Take 10 mLs (1 g) by mouth 4 times daily    Abdominal pain, generalized       TYLENOL PO      Take 650 mg by mouth every 4 hours as needed for mild pain or fever        * Notice:  This list has 2 medication(s) that are the same as other medications prescribed for you. Read the directions carefully, and ask your doctor or other care provider to review them with you.

## 2017-09-05 NOTE — PROGRESS NOTES
SUBJECTIVE:   Xochilt Trujillo is a 89 year old female who presents to clinic today for the following health issues:  This patient is accompanied in the office by her daughter.    PCP: Fran Haile MD    The patient is enrolled in hospice care, primarily for cerebrovascular disease and dementia. She has a RN come by weekly. See telephone encounters from hospice RN in [facility].    ABDOMINAL PAIN     Onset: 2-3 months    Description:   Character: Intermittent sharp pain that becomes dull ache  Location: kenna-umbilical region  Radiation: Lower back    Intensity: severe    Progression of Symptoms: worsening     Accompanying Signs & Symptoms:  Fever/Chills?: no   Gas/Bloating: no   Nausea: no   Vomitting: YES  Diarrhea?: YES- loose stools  Constipation: no   Dysuria or Hematuria: no    History:   Trauma: no   Previous similar pain: no    Previous tests done: none    Precipitating factors:   Does the pain change with:     Food: no      BM: no     Urination: no     Alleviating factors:  None    Therapies Tried and outcome: TUMS and Omeprazole 20mg bid without improvement. Oxycodone and morphine have been used prn with relief of pain - administered by daughter, notes she tries to avoid giving too frequently.    LMP:  not applicable     Her appetite has been very good.    Her abdominal pain has not been significantly disruptive to her sleep.    Daughter is wondering if this pain is related to the patient's abdominal aneurysm.      Patient expresses frustration with memory difficulties.      Past Medical History:   Diagnosis Date     Abdominal aneurysm (H)     3-5 cm     ASHD (arteriosclerotic heart disease)      CVA (cerebral infarction) 1999     Cystocele      Erosive gastritis      Hypertension      MI (myocardial infarction) (H) 1999     Osteoporosis      Other and unspecified hyperlipidemia      Ovarian cancer (H)      PUD (peptic ulcer disease)      Skin cancer 07/2010    SCCIS of the L nasal bridge     Type 2  diabetes, HbA1C goal < 8% (H) 8/6/2012       Past Surgical History:   Procedure Laterality Date     CATARACT IOL, RT/LT       COLONOSCOPY       D & C       EXTRACAPSULAR CATARACT EXTRATION WITH INTRAOCULAR LENS IMPLANT  1-03/9-04    left/right     HC ESOPHAGOSCOPY, DIAGNOSTIC       HYSTERECTOMY, CERVIX STATUS UNKNOWN      For ovarian cancer       History reviewed. No pertinent family history.    Social History   Substance Use Topics     Smoking status: Never Smoker     Smokeless tobacco: Never Used     Alcohol use No       Reviewed and updated as needed this visit by clinical staff  Tobacco  Allergies  Meds  Med Hx  Surg Hx  Fam Hx  Soc Hx      Reviewed and updated as needed this visit by Provider            ROS: 5 point ROS otherwise normal except for those listed above.      This document serves as a record of the services and decisions personally performed and made by Sharon Brown MD. It was created on his behalf by Lexi Storey, a trained medical scribe. The creation of this document is based the provider's statements to the medical scribe.  Lexi Storey 2:33 PM September 5, 2017    OBJECTIVE:     /70  Pulse 74  Temp 97.6  F (36.4  C) (Tympanic)  There is no height or weight on file to calculate BMI.     GENERAL: Healthy, alert and no acute distress  RESP: Lungs clear to auscultation - no rales, rhonchi or wheezes  CV: Regular rate and rhythm, normal S1 S2, no murmur, no peripheral edema  ABD: Abdomen is soft and diffusely tender to palpation without rebound or guarding. Exam was not consistent and seemed to improve with distraction.   NEURO: Normal strength and tone per age. Speech is normal.  PSYCH: Affect is bright.    ASSESSMENT/PLAN:     (R10.84) Abdominal pain, generalized  (primary encounter diagnosis)  Comment: Etiology unclear. Non-acute abdomen today. Will expand treatment for presumed dyspepsia. As there has been no improvement with increased dosage of PPI, will try sucralfate TID. If no  improvement in 2 weeks, will re-evaluate to see if further diagnostic testing is indicated.   Plan: sucralfate (CARAFATE) 1 GM/10ML suspension - Start sucralfate. Continue on other medications as directed.       Patient will follow up if symptoms worsen or do not improve in 2 weeks. Advised to watch for increasing symptoms. Patient instructed to call with any questions or concerns.    Patient Instructions   *   Not sure.     *   Try Carafate, 4 times per day. This may help. Give it 2 weeks.     *   Keep us updated.       The information in this document, created by a scribe for me, accurately reflects the services I personally performed and the decisions made by me. I have reviewed and approved this document for accuracy.  2:54 PM September 5, 2017     total time spent with pt. was 25 minutes, 20 minutes of the visit was spent discussing the above issues, including pathophysiology, treatment options, and expected outcomes.     Sharon Brown MD  [facility]

## 2017-09-05 NOTE — PATIENT INSTRUCTIONS
*   Not sure.     *   Try Carafate, 4 times per day. This may help. Give it 2 weeks.     *   Keep us updated.

## 2017-09-12 ENCOUNTER — TELEPHONE (OUTPATIENT)
Dept: FAMILY MEDICINE | Facility: CLINIC | Age: 82
End: 2017-09-12

## 2017-09-12 NOTE — TELEPHONE ENCOUNTER
Hospice Update-No Response Required  IDT NOTE BY: Demetrice Chicas, RN  PRIMARY PROVIDER: Dr. Fran Haile  PRIMARY DX: Cerebrovascular disease  CO-MORBIDITIES: Chronic kidney failure, Peripheral vascular disease, HTN, CVA, frequent UTI, CAD, MI, diabetes, abdominal aneurysm(3-5cm).  POLST: DNR/DNI  CHANGES IN STATUS SINCE LAST IDT: 89 year old female residing in own home with daughter. Client alert with worsening confusion/disorientation. Daughter suspected UTI but no improvement with abx treatement. Requires assist for ADL's due to cognitive deficits. Increased weakness-recent fall with no apparent injuries. Intake is good-able to feed self after meal prep and setup. Ongoing anxiety-currently being treated with prn lorazepam. Seen in clinic-started on carafate for c/o stomach discomfort.   MED CHANGES: Cipro 250mg BID x 7 days. Carafate 1 GM QID.   PATIENT GOALS: To stay out of the hospital.  FAMILY GOALS: To have pt be comfortable and happy.  SMART GOAL: To have less spasms in legs.  PLAN FOR NEXT 2 WEEKS: SNV 1x per week for symptom management, pain control, EOL education/support. HHA 3x/week for ADL support.  Massage therapy. Volunteer and  as requested.

## 2017-09-26 ENCOUNTER — TELEPHONE (OUTPATIENT)
Dept: FAMILY MEDICINE | Facility: CLINIC | Age: 82
End: 2017-09-26

## 2017-09-26 NOTE — TELEPHONE ENCOUNTER
Hospice Update-No Response Required  IDT NOTE BY: Demetrice Chicas, RN  PRIMARY PROVIDER: Dr. Fran Haile  PRIMARY DX: Cerebrovascular disease  CO-MORBIDITIES: Chronic kidney failure, Peripheral vascular disease, HTN, CVA, frequent UTI, CAD, MI, diabetes, abdominal aneurysm(3-5cm).  POLST: DNR/DNI  CHANGES IN STATUS SINCE LAST IDT: 89 year old female residing in own home with daughter. Client alert with worsening confusion/disorientation. Requires assist for ADL's due to cognitive deficits.  Intake is good-able to feed self after meal prep and setup. Ongoing anxiety-currently being treated with prn lorazepam. Decreased complaints of stomach discomfort.   MED CHANGES: None  PATIENT GOALS: To stay out of the hospital.  FAMILY GOALS: To have pt be comfortable and happy.  SMART GOAL: To have less spasms in legs.  PLAN FOR NEXT 2 WEEKS: SNV 1x per week for symptom management, pain control, EOL education/support. HHA 3x/week for ADL support.  Massage therapy. Volunteer and  as requested.

## 2017-10-10 ENCOUNTER — TELEPHONE (OUTPATIENT)
Dept: FAMILY MEDICINE | Facility: CLINIC | Age: 82
End: 2017-10-10

## 2017-10-10 NOTE — TELEPHONE ENCOUNTER
Hospice Update-No Response Required  PRIMARY PROVIDER: Dr. Fran Haile  IDT NOTE BY: Demetrice Chicas, RN  PRIMARY DX: Cerebrovascular disease  CO-MORBIDITIES: Chronic kidney failure, Peripheral vascular disease, HTN, CVA, frequent UTI, CAD, MI, diabetes, abdominal aneurysm(3-5cm).  POLST: DNR/DNI  CHANGES IN STATUS SINCE LAST IDT: 89 year old female residing in own home with daughter. Client alert with worsening confusion/disorientation now with intermittent delusions/hallucinations. Requires assist for ADL's due to cognitive deficits.  Intake is good-able to feed self after meal prep and setup.   MED CHANGES: Haldol 1mg BID on 9/28/17 then decreased to 0.5mg Q 6hrs prn. Amlodipine 5mg scheduled daily for elevated BP.   PATIENT GOALS: To stay out of the hospital.  FAMILY GOALS: To have pt be comfortable and happy.  SMART GOAL: To have less spasms in legs.  PLAN FOR NEXT 2 WEEKS: SNV 1x per week for symptom management, pain control, EOL education/support. HHA 3x/week for ADL support.  Massage therapy. Volunteer and  as requested.

## 2017-10-18 ENCOUNTER — DOCUMENTATION ONLY (OUTPATIENT)
Dept: OTHER | Facility: CLINIC | Age: 82
End: 2017-10-18

## 2017-10-18 DIAGNOSIS — Z71.89 ACP (ADVANCE CARE PLANNING): Chronic | ICD-10-CM

## 2017-10-25 ENCOUNTER — TELEPHONE (OUTPATIENT)
Dept: FAMILY MEDICINE | Facility: CLINIC | Age: 82
End: 2017-10-25

## 2017-10-25 NOTE — TELEPHONE ENCOUNTER
Hospice Update-No Response Required  IDT NOTE BY: Demetrice Chicas, RN                                                                                                                                                                                      PRIMARY PROVIDER: Dr. Fran Haile  PRIMARY DX: Cerebrovascular disease  CO-MORBIDITIES: Chronic kidney failure, Peripheral vascular disease, HTN, CVA, frequent UTI, CAD, MI, diabetes, abdominal aneurysm(3-5cm).  POLST: DNR/DNI  CHANGES IN STATUS SINCE LAST IDT: 89 year old female residing in own home with daughter. Client alert with worsening confusion/disorientation now with intermittent delusions/hallucinations and increased restlessness/word finding difficulty in night/early morning hours-encouraged trial of seroquel 12.5mg Q evening. Significant word finding difficulty now present. Requires assist for ADL's due to cognitive deficits.  Intake is good-able to feed self after meal prep and setup.   MED CHANGES: Seroquel 12.5mg Q 6hrs prn  PATIENT GOALS: To stay out of the hospital.  FAMILY GOALS: To have pt be comfortable and happy.  SMART GOAL: To have less spasms in legs.  PLAN FOR NEXT 2 WEEKS: SNV 1x per week for symptom management, pain control, EOL education/support. HHA 3x/week for ADL support.  Massage therapy. Volunteer and  as requested.

## 2017-10-30 ENCOUNTER — DOCUMENTATION ONLY (OUTPATIENT)
Dept: HOME HOSPICE | Facility: OTHER | Age: 82
End: 2017-10-30

## 2017-10-30 NOTE — PROGRESS NOTES
[facility] now requests orders and shares plan of care/discharge summaries for some patients through [employer].  Please REPLY TO THIS MESSAGE in order to give authorization for orders when needed.  This is considered a verbal order, you will still receive a faxed copy of orders for signature.  Thank you for your assistance in improving collaboration for our patients.    ORDER  Patient/Family requesting Fluzone High-Dose

## 2017-11-07 ENCOUNTER — TELEPHONE (OUTPATIENT)
Dept: FAMILY MEDICINE | Facility: CLINIC | Age: 82
End: 2017-11-07

## 2017-11-07 NOTE — TELEPHONE ENCOUNTER
Hospice Update-No Response Required  IDT NOTE BY: Demetrice Chicas, RN                                                                                                                                                                                      PRIMARY PROVIDER: Dr. Fran Haile  PRIMARY DX: Cerebrovascular disease  CO-MORBIDITIES: Chronic kidney failure, Peripheral vascular disease, HTN, CVA, frequent UTI, CAD, MI, diabetes, abdominal aneurysm(3-5cm).  POLST: DNR/DNI  CHANGES IN STATUS SINCE LAST IDT: 89 year old female residing in own home with daughter. Client alert with confusion/disorientation now with intermittent delusions/hallucinations-seroquel being utilized prn. Requires assist for ADL's due to cognitive deficits.  Intake is good-able to feed self after meal prep and setup.   MED CHANGES: None  PATIENT GOALS: To stay out of the hospital.  FAMILY GOALS: To have pt be comfortable and happy.  SMART GOAL: To have less spasms in legs.  PLAN FOR NEXT 2 WEEKS: SNV 1x per week for symptom management, pain control, EOL education/support. HHA 3x/week for ADL support.  Massage therapy. Volunteer and  as requested.

## 2017-11-15 ENCOUNTER — DOCUMENTATION ONLY (OUTPATIENT)
Dept: CARE COORDINATION | Facility: CLINIC | Age: 82
End: 2017-11-15

## 2017-11-15 NOTE — PROGRESS NOTES
[facility] now requests orders and shares plan of care/discharge summaries for some patients through [employer].  Please REPLY TO THIS MESSAGE in order to give authorization for orders when needed.  This is considered a verbal order, you will still receive a faxed copy of orders for signature.  Thank you for your assistance in improving collaboration for our patients.    ORDER  OK to admit to hospice. OK for hospice orders. OK for current meds and treatments.  MSW eval and treat and admit to hospice by hospice consult. Pt currently receiving services, transferring to a new EMR so new order will be sent.  MD SUMMARY/PLAN OF CARE    DISCHARGE SUMMARY

## 2017-11-22 ENCOUNTER — TELEPHONE (OUTPATIENT)
Dept: PEDIATRICS | Facility: CLINIC | Age: 82
End: 2017-11-22

## 2017-11-22 NOTE — TELEPHONE ENCOUNTER
Verbal orders needed for transfer of care from [facility] to [facility].    Diana Woodruff, [place]

## 2017-11-30 ENCOUNTER — TELEPHONE (OUTPATIENT)
Dept: CARE COORDINATION | Facility: CLINIC | Age: 82
End: 2017-11-30

## 2017-11-30 DIAGNOSIS — R41.82 ALTERED MENTAL STATUS, UNSPECIFIED ALTERED MENTAL STATUS TYPE: ICD-10-CM

## 2017-11-30 DIAGNOSIS — R41.82 ALTERED MENTAL STATUS, UNSPECIFIED ALTERED MENTAL STATUS TYPE: Primary | ICD-10-CM

## 2017-11-30 DIAGNOSIS — N30.00 ACUTE CYSTITIS WITHOUT HEMATURIA: Primary | ICD-10-CM

## 2017-11-30 LAB

## 2017-11-30 PROCEDURE — 81001 URINALYSIS AUTO W/SCOPE: CPT | Performed by: FAMILY MEDICINE

## 2017-11-30 NOTE — TELEPHONE ENCOUNTER
Can you please put in an order for a UA/UC. Pt displaying increased confusion and decline, dtr wishes to rule out UTI. Dtr to collect sample as able and will drop of at clinic.

## 2017-12-01 RX ORDER — AMOXICILLIN 250 MG/1
250 CAPSULE ORAL 3 TIMES DAILY
Qty: 21 CAPSULE | Refills: 0 | Status: SHIPPED | OUTPATIENT
Start: 2017-12-01 | End: 2019-01-01

## 2017-12-01 NOTE — PROGRESS NOTES
Staff to call and notify of findings and prescription. .    Ms. Trujillo,    The urine analysis showed that there could be a mild urinary tract infection.  It also could just be contamination from surrounding skin. I will send an antibiotic over pending culture.      Please contact the clinic if you have additional questions.  Thank you.    Sincerely,    Jaylan Haile MD

## 2017-12-05 ENCOUNTER — MEDICAL CORRESPONDENCE (OUTPATIENT)
Dept: HEALTH INFORMATION MANAGEMENT | Facility: CLINIC | Age: 82
End: 2017-12-05

## 2017-12-07 ENCOUNTER — TELEPHONE (OUTPATIENT)
Dept: FAMILY MEDICINE | Facility: CLINIC | Age: 82
End: 2017-12-07

## 2017-12-07 DIAGNOSIS — R41.82 ALTERED MENTAL STATUS, UNSPECIFIED ALTERED MENTAL STATUS TYPE: Primary | ICD-10-CM

## 2017-12-07 NOTE — TELEPHONE ENCOUNTER
Urine ordered for decline in mental status.  This is not improved.  Home care nurse to collect new specimen.  Future orders placed.    Cathie Maravilla

## 2017-12-07 NOTE — TELEPHONE ENCOUNTER
Home caring nurse here to get urine culture results that were not done. Collect another UA? Linda Enrique RN

## 2017-12-08 DIAGNOSIS — R41.82 ALTERED MENTAL STATUS, UNSPECIFIED ALTERED MENTAL STATUS TYPE: ICD-10-CM

## 2017-12-08 DIAGNOSIS — R30.0 DYSURIA: Primary | ICD-10-CM

## 2017-12-08 LAB
ALBUMIN UR-MCNC: ABNORMAL MG/DL
APPEARANCE UR: ABNORMAL
BACTERIA #/AREA URNS HPF: ABNORMAL /HPF
BILIRUB UR QL STRIP: NEGATIVE
COLOR UR AUTO: YELLOW
GLUCOSE UR STRIP-MCNC: NEGATIVE MG/DL
HGB UR QL STRIP: NEGATIVE
KETONES UR STRIP-MCNC: 15 MG/DL
LEUKOCYTE ESTERASE UR QL STRIP: ABNORMAL
NITRATE UR QL: POSITIVE
NON-SQ EPI CELLS #/AREA URNS LPF: ABNORMAL /LPF
PH UR STRIP: 6 PH (ref 5–7)
RBC #/AREA URNS AUTO: ABNORMAL /HPF
SOURCE: ABNORMAL
SP GR UR STRIP: 1.02 (ref 1–1.03)
UROBILINOGEN UR STRIP-ACNC: 0.2 EU/DL (ref 0.2–1)
WBC #/AREA URNS AUTO: ABNORMAL /HPF

## 2017-12-08 PROCEDURE — 81001 URINALYSIS AUTO W/SCOPE: CPT | Mod: GW | Performed by: FAMILY MEDICINE

## 2017-12-08 PROCEDURE — 87088 URINE BACTERIA CULTURE: CPT | Performed by: FAMILY MEDICINE

## 2017-12-08 PROCEDURE — 87186 SC STD MICRODIL/AGAR DIL: CPT | Performed by: FAMILY MEDICINE

## 2017-12-08 PROCEDURE — 87086 URINE CULTURE/COLONY COUNT: CPT | Mod: GW | Performed by: FAMILY MEDICINE

## 2017-12-11 LAB
BACTERIA SPEC CULT: ABNORMAL
Lab: ABNORMAL
SPECIMEN SOURCE: ABNORMAL

## 2017-12-12 RX ORDER — CEFPODOXIME PROXETIL 100 MG/1
100 TABLET, FILM COATED ORAL 2 TIMES DAILY
Qty: 10 TABLET | Refills: 0 | Status: SHIPPED | OUTPATIENT
Start: 2017-12-12 | End: 2019-01-01

## 2017-12-12 NOTE — PROGRESS NOTES
Urgent care positive and resistant to most recent antibiotic . Will send over another different one. Please notify patient daugther.     Jaylan Haile MD

## 2017-12-19 ENCOUNTER — MEDICAL CORRESPONDENCE (OUTPATIENT)
Dept: HEALTH INFORMATION MANAGEMENT | Facility: CLINIC | Age: 82
End: 2017-12-19

## 2017-12-19 NOTE — TELEPHONE ENCOUNTER
Notes per lab results:    Patient Result Comments   Entered by Fran Haile MD at 12/12/2017  2:45 PM   Urgent care positive and resistant to most recent antibiotic . Will send over another different one. Please notify patient daugther.     Jaylan Haile MD      Patient Release Status:   This result is viewable by the patient in MyChart.   Last viewed in Razzhart:   12/11/2017  9:07 AM   By:   Xochilt Trujillo   Result Notes   Notes Recorded by Kelsey Ansari MA on 12/12/2017 at 3:18 PM  Called with Results, patients daughter understood with no questions.   Kelsey Ansari, GLENNY CISSE RN

## 2017-12-22 DIAGNOSIS — R10.84 ABDOMINAL PAIN, GENERALIZED: ICD-10-CM

## 2017-12-27 DIAGNOSIS — R10.84 ABDOMINAL PAIN, GENERALIZED: ICD-10-CM

## 2017-12-27 RX ORDER — SUCRALFATE 1 G/10ML
SUSPENSION ORAL
Qty: 420 ML | Refills: 1 | OUTPATIENT
Start: 2017-12-27

## 2017-12-27 RX ORDER — SUCRALFATE ORAL 1 G/10ML
1 SUSPENSION ORAL 4 TIMES DAILY
Qty: 420 ML | Refills: 1 | Status: SHIPPED | OUTPATIENT
Start: 2017-12-27 | End: 2019-01-01

## 2017-12-27 NOTE — TELEPHONE ENCOUNTER
Requested Prescriptions   Pending Prescriptions Disp Refills     CARAFATE 1 GM/10ML suspension [Pharmacy Med Name: CARAFATE 1GM/10ML SUSP] 420 mL 1     Sig: TAKE 10 MLS BY MOUTH FOUR TIMES A DAY    Rx Protocol Miscellaneous Gastrointestinal Agents Passed    12/27/2017  9:11 AM       Passed - Recent or future visit with authorizing provider's specialty    Patient had office visit in the last year or has a visit in the next 30 days with authorizing provider.  See chart review.              Passed - Patient is 18 years of age or older          sucralfate (CARAFATE) 1 GM/10ML       Last Written Prescription Date: 9/5/17  Last Fill Quantity: 420ml,  # refills: 1   Last Office Visit with G, P or [facility] prescribing provider: 9/5/17

## 2017-12-27 NOTE — TELEPHONE ENCOUNTER
Prescription approved per [facility] Protocol or patient Primary care provider (PCP)  HERMAN Patel RN/Otto Sanford

## 2018-01-16 ENCOUNTER — MEDICAL CORRESPONDENCE (OUTPATIENT)
Dept: HEALTH INFORMATION MANAGEMENT | Facility: CLINIC | Age: 83
End: 2018-01-16

## 2018-01-30 ENCOUNTER — MEDICAL CORRESPONDENCE (OUTPATIENT)
Dept: HEALTH INFORMATION MANAGEMENT | Facility: CLINIC | Age: 83
End: 2018-01-30

## 2019-01-01 ENCOUNTER — DOCUMENTATION ONLY (OUTPATIENT)
Dept: OPHTHALMOLOGY | Facility: CLINIC | Age: 84
End: 2019-01-01

## 2019-01-01 ENCOUNTER — NURSING HOME VISIT (OUTPATIENT)
Dept: GERIATRICS | Facility: CLINIC | Age: 84
End: 2019-01-01
Payer: MEDICARE

## 2019-01-01 ENCOUNTER — DOCUMENTATION ONLY (OUTPATIENT)
Dept: OTHER | Facility: CLINIC | Age: 84
End: 2019-01-01

## 2019-01-01 VITALS
HEART RATE: 89 BPM | OXYGEN SATURATION: 95 % | SYSTOLIC BLOOD PRESSURE: 100 MMHG | DIASTOLIC BLOOD PRESSURE: 65 MMHG | TEMPERATURE: 96.4 F | RESPIRATION RATE: 16 BRPM

## 2019-01-01 DIAGNOSIS — F01.50 VASCULAR DEMENTIA WITHOUT BEHAVIORAL DISTURBANCE (H): ICD-10-CM

## 2019-01-01 DIAGNOSIS — I71.40 ABDOMINAL AORTIC ANEURYSM WITHOUT RUPTURE (H): ICD-10-CM

## 2019-01-01 DIAGNOSIS — N18.4 CKD (CHRONIC KIDNEY DISEASE) STAGE 4, GFR 15-29 ML/MIN (H): ICD-10-CM

## 2019-01-01 DIAGNOSIS — Z86.73 H/O: CVA (CEREBROVASCULAR ACCIDENT): Primary | ICD-10-CM

## 2019-01-01 DIAGNOSIS — E11.9 TYPE 2 DIABETES MELLITUS WITHOUT RETINOPATHY (H): ICD-10-CM

## 2019-01-01 DIAGNOSIS — C56.9 MALIGNANT NEOPLASM OF OVARY, UNSPECIFIED LATERALITY (H): ICD-10-CM

## 2019-01-01 PROCEDURE — 99308 SBSQ NF CARE LOW MDM 20: CPT | Mod: GV | Performed by: NURSE PRACTITIONER

## 2019-01-01 RX ORDER — LORAZEPAM 1 MG/1
1 TABLET ORAL AT BEDTIME
COMMUNITY

## 2019-01-01 RX ORDER — HYDROMORPHONE HYDROCHLORIDE 1 MG/ML
1 SOLUTION ORAL
COMMUNITY

## 2019-01-01 RX ORDER — BISACODYL 10 MG
10 SUPPOSITORY, RECTAL RECTAL DAILY PRN
COMMUNITY

## 2019-01-01 RX ORDER — AMOXICILLIN 250 MG
3 CAPSULE ORAL 2 TIMES DAILY
COMMUNITY

## 2019-01-01 RX ORDER — QUETIAPINE FUMARATE 100 MG/1
100 TABLET, FILM COATED ORAL AT BEDTIME
COMMUNITY

## 2019-01-01 RX ORDER — QUETIAPINE FUMARATE 25 MG/1
25 TABLET, FILM COATED ORAL 2 TIMES DAILY
COMMUNITY

## 2019-01-01 RX ORDER — QUETIAPINE FUMARATE 50 MG/1
50 TABLET, FILM COATED ORAL DAILY
COMMUNITY

## 2019-01-01 RX ORDER — QUETIAPINE FUMARATE 25 MG/1
25 TABLET, FILM COATED ORAL DAILY
COMMUNITY

## 2019-01-01 RX ORDER — ACETAMINOPHEN 650 MG/1
650 SUPPOSITORY RECTAL PRN
COMMUNITY

## 2019-01-28 NOTE — LETTER
"    1/28/2019        RE: Xochilt Trujillo  [address] [facility] [address]        [facility]  Chief Complaint   Patient presents with     intermediate Acute     [facility] Record Number:  [id number]  Place of Service where encounter took place:  [facility]  ([facility]) [[id number]]    HPI:    Xochilt Trujillo is a 89 year old  (5/28/1924), who is being seen today for an episodic care visit.  HPI information obtained from: facility chart records, facility staff, patient report and [facility] chart review.     Today's concern is:  H/O: CVA (cerebrovascular accident)  Vascular dementia without behavioral disturbance  Abdominal aortic aneurysm without rupture (H)  Malignant neoplasm of ovary, unspecified laterality (H)  CKD (chronic kidney disease) stage 4, GFR 15-29 ml/min (H)  Type 2 diabetes mellitus without retinopathy (H)  Patient admitted to [place] for respite care while primary family and home caregivers are enjoying a trip to [place].  Hospice at bedside assisting w/ cares/mobility and order clarification. Hospice team and outpatient PCP is to manage patient primarily while here, however hospice team hoping to transition this patient to LTC @  and they apparently have initiated talks about this with family. Concerns from family and team are that patient is a big fall risk, and falls constantly at home. Patient utilizes an alarm at home, and [place] is \"alarm free\" by policy. Fall mats are an option, but given mobility, they may pose more of a trip hazard to this patient.  Today, patient is nonverbal, comfortable, sleeping, and is unable to verbalize HPI/ROS. Sighx as noted above w/ last labs for CKD, DM, unknown. Hospice states that she often c/o abdominal pain, which is consistent w/ her AAA.    PMH/PSH reviewed in [facility] today.    REVIEW OF SYSTEMS:  Unobtainable secondary to cognitive impairment.     /65   Pulse 89   Temp 96.4  F (35.8  C)   " "Resp 16   SpO2 95%   GENERAL APPEARANCE: Alert, in no distress, cooperative.   ENT: Mouth and posterior oropharynx normal, moist mucous membranes, hearing acuity [place].  EYES: EOM, conjunctivae, lids, pupils and irises normal, PERRL2.   RESP: Respiratory effort and palpation of chest normal, no respiratory distress, Lung sounds clear/diminished.  CV: Palpation and auscultation of heart done, rate and rhythm irregular, soft 1-2/6 systolic murmur, no rub or gallop, Edema 0+ BLE. Loud abdominal bruit.   ABDOMEN: Normal bowel sounds, soft, nontender, no hepatosplenomegaly or other masses.  SKIN: Inspection/Palpation of skin and subcutaneous tissue baseline w/ fragility.  NEURO: 2-12 at patient's baseline, sensation baseline PPS.  PSYCH: Insight and judgement, memory impaired at baseline, affect and mood normal.    ASSESSMENT/PLAN:  H/O: CVA (cerebrovascular accident)  Vascular dementia without behavioral disturbance  Abdominal aortic aneurysm without rupture (H)  Malignant neoplasm of ovary, unspecified laterality (H)  CKD (chronic kidney disease) stage 4, GFR 15-29 ml/min (H)  Type 2 diabetes mellitus without retinopathy (H)  Chronic. Stable. Progressive illnesses. I have collaborated and clarified as noted below, and will be available as a \"back-up\" provider should hospice provider or PCP not be available during respite services. Will only follow-up as needed.     Orders:  1. DNR/DNI per family/hospice/POLST.  2. Trazodone clarified.   3. Standing house orders initiated.   4. Nursing notified of fall risk and appropriate interventions.  FYI: Collaborated w/ hospice and daughter at bedside.      Electronically signed by:  Dr. Shikha Quinonez, APRN CNP DNP        Sincerely,        Shikha Quinonez, NP    "

## 2019-01-28 NOTE — PROGRESS NOTES
"[facility]  Chief Complaint   Patient presents with     FPC Acute     [facility] Record Number:  [id number]  Place of Service where encounter took place:  [facility]  ([facility]) [[id number]]    HPI:    Xochilt Trujillo is a 89 year old  (5/28/1924), who is being seen today for an episodic care visit.  HPI information obtained from: facility chart records, facility staff, patient report and [facility] chart review.     Today's concern is:  H/O: CVA (cerebrovascular accident)  Vascular dementia without behavioral disturbance  Abdominal aortic aneurysm without rupture (H)  Malignant neoplasm of ovary, unspecified laterality (H)  CKD (chronic kidney disease) stage 4, GFR 15-29 ml/min (H)  Type 2 diabetes mellitus without retinopathy (H)  Patient admitted to [place] for respite care while primary family and home caregivers are enjoying a trip to [place].  Hospice at bedside assisting w/ cares/mobility and order clarification. Hospice team and outpatient PCP is to manage patient primarily while here, however hospice team hoping to transition this patient to LTC @  and they apparently have initiated talks about this with family. Concerns from family and team are that patient is a big fall risk, and falls constantly at home. Patient utilizes an alarm at home, and [place] is \"alarm free\" by policy. Fall mats are an option, but given mobility, they may pose more of a trip hazard to this patient.  Today, patient is nonverbal, comfortable, sleeping, and is unable to verbalize HPI/ROS. Sighx as noted above w/ last labs for CKD, DM, unknown. Hospice states that she often c/o abdominal pain, which is consistent w/ her AAA.    PMH/PSH reviewed in [facility] today.    REVIEW OF SYSTEMS:  Unobtainable secondary to cognitive impairment.     /65   Pulse 89   Temp 96.4  F (35.8  C)   Resp 16   SpO2 95%   GENERAL APPEARANCE: Alert, in no distress, cooperative.   ENT: Mouth and " "posterior oropharynx normal, moist mucous membranes, hearing acuity [place].  EYES: EOM, conjunctivae, lids, pupils and irises normal, PERRL2.   RESP: Respiratory effort and palpation of chest normal, no respiratory distress, Lung sounds clear/diminished.  CV: Palpation and auscultation of heart done, rate and rhythm irregular, soft 1-2/6 systolic murmur, no rub or gallop, Edema 0+ BLE. Loud abdominal bruit.   ABDOMEN: Normal bowel sounds, soft, nontender, no hepatosplenomegaly or other masses.  SKIN: Inspection/Palpation of skin and subcutaneous tissue baseline w/ fragility.  NEURO: 2-12 at patient's baseline, sensation baseline PPS.  PSYCH: Insight and judgement, memory impaired at baseline, affect and mood normal.    ASSESSMENT/PLAN:  H/O: CVA (cerebrovascular accident)  Vascular dementia without behavioral disturbance  Abdominal aortic aneurysm without rupture (H)  Malignant neoplasm of ovary, unspecified laterality (H)  CKD (chronic kidney disease) stage 4, GFR 15-29 ml/min (H)  Type 2 diabetes mellitus without retinopathy (H)  Chronic. Stable. Progressive illnesses. I have collaborated and clarified as noted below, and will be available as a \"back-up\" provider should hospice provider or PCP not be available during respite services. Will only follow-up as needed.     Orders:  1. DNR/DNI per family/hospice/POLST.  2. Trazodone clarified.   3. Standing house orders initiated.   4. Nursing notified of fall risk and appropriate interventions.  FYI: Collaborated w/ hospice and daughter at bedside.      Electronically signed by:  Dr. Shikha Quinonez, APRN CNP DNP    "

## 2022-09-28 NOTE — LETTER
Transition Communication Hand-off for Care Transitions to Next Level of Care Provider    Name: Xochilt Trujillo  MRN #: [id number]  Primary Care Provider: Fran Haile     Primary Clinic: [address] [address]     Reason for Hospitalization:  Hypertensive urgency [I16.0]  Admit Date/Time: 5/23/2017  6:34 PM  Discharge Date: 5/26/17  Payor Source: Payor: UCARE / Plan: [employer] FOR [employer]ORS / Product Type: HMO /            Reason for Communication Hand-off Referral: Fragility    Discharge Plan: home with family and [facility]       Concern for non-adherence with plan of care:    Y/N n  Discharge Needs Assessment:  Needs       Most Recent Value    Equipment Currently Used at Home walker, rolling, walker, standard    Transportation Available family or friend will provide    Home Care [facility] [phone], Fax: [phone]          Already enrolled in Tele-monitoring program and name of program:    Follow-up specialty is recommended: Yes    Follow-up plan:  Future Appointments  Date Time Provider Department Center   5/27/2017 6:00 AM Brie Holland, PT [facility]       Any outstanding tests or procedures:        Referrals     Future Labs/Procedures    Home care nursing referral     Comments:    [facility]  Ph: [phone]  Fax: [phone]    RN skilled nursing visit. RN to assess vital signs and weight, respiratory and cardiac status, pain level and activity tolerance, hydration, nutrition and bowel status and home safety.  RN to teach medication management.  Home health aide to assist with ADL'S    Your provider has ordered home care nursing services. If you have not been contacted within 2 days of your discharge please call the inpatient department phone number at [phone] .    Home Care OT Referral for Hospital Discharge     Comments:    [facility]  Ph: [phone]  Fax: [phone]    OT  to eval and treat    Your provider has ordered home care - occupational therapy. If you have not been contacted within 2 days of your discharge please call the department phone number listed on the top of this document.    Home Care PT Referral for Hospital Discharge     Comments:    [facility]  Ph: [phone]  Fax: [phone]    PT to eval and treat    Your provider has ordered home care - physical therapy. If you have not been contacted within 2 days of your discharge please call the department phone number listed on the top of this document.            Key Recommendations:      Suzi Cardoso    AVS/Discharge Summary is the source of truth; this is a helpful guide for improved communication of patient story           Detail Level: Detailed

## 2023-01-12 NOTE — PROGRESS NOTES
Learning About Mindfulness for Stress  What are mindfulness and stress? Stress is what you feel when you have to handle more than you are used to. A lot of things can cause stress. You may feel stress when you go on a job interview, take a test, or run a race. This kind of short-term stress is normal and even useful. It can help you if you need to work hard or react quickly. Stress also can last a long time. Long-term stress is caused by stressful situations or events. Examples of long-term stress include long-term health problems, ongoing problems at work, and conflicts in your family. Long-term stress can harm your health. Mindfulness is a focus only on things happening in the present moment. It's a process of purposefully paying attention to and being aware of your surroundings, your emotions, your thoughts, and how your body feels. You are aware of these things, but you aren't judging these experiences as \"good\" or \"bad. \" Mindfulness can help you learn to calm your mind and body to help you cope with illness, pain, and stress. How does mindfulness help to relieve stress? Mindfulness can help quiet your mind and relax your body. Studies show that it can help some people sleep better, feel less anxious, and bring their blood pressure down. And it's been shown to help some people live and cope better with certain health problems like heart disease, depression, chronic pain, and cancer. How do you practice mindfulness? To be mindful is to pay attention, to be present, and to be accepting. When you're mindful, you do just one thing and you pay close attention to that one thing. For example, you may sit quietly and notice your emotions or how your food tastes and smells. When you're present, you focus on the things that are happening right now. You let go of your thoughts about the past and the future. When you dwell on the past or the future, you miss moments that can heal and strengthen you.  You may Ms. Trujillo,    Your urine culture grew out bacteria.  Even though you weren't having a lot of symptoms, the combination of your urine analysis and the culture is an indication for treatment with an antibiotic.  I will send something to your pharmacy.    Please contact the clinic if you have additional questions.  Thank you.    Sincerely,    Jaylan Haile MD   miss moments like hearing a child laugh or seeing a friendly face when you think you're all alone. When you're accepting, you don't  the present moment. Instead you accept your thoughts and feelings as they come. You can practice anytime, anywhere, and in any way you choose. You can practice in many ways. Here are a few ideas:  While doing your chores, like washing the dishes, let your mind focus on what's in your hand. What does the dish feel like? Is the water warm or cold? Go outside and take a few deep breaths. What is the air like? Is it warm or cold? When you can, take some time at the start of your day to sit alone and think. Take a slow walk by yourself. Count your steps while you breathe in and out. Try yoga breathing exercises, stretches, and poses to strengthen and relax your muscles. At work, if you can, try to stop for a few moments each hour. Note how your body feels. Let yourself regroup and let your mind settle before you return to what you were doing. If you struggle with anxiety or \"worry thoughts,\" imagine your mind as a blue nikky and your worry thoughts as clouds. Now imagine those worry thoughts floating across your mind's nikky. Just let them pass by as you watch. Follow-up care is a key part of your treatment and safety. Be sure to make and go to all appointments, and call your doctor if you are having problems. It's also a good idea to know your test results and keep a list of the medicines you take. Where can you learn more? Go to http://www.gray.com/  Enter M676 in the search box to learn more about \"Learning About Mindfulness for Stress. \"  Current as of: February 9, 2022               Content Version: 13.4  © 8999-5734 [employer]. Care instructions adapted under license by [employer] (which disclaims liability or warranty for this information).  If you have questions about a medical condition or this instruction, always ask your healthcare professional. [place] [address] any warranty or liability for your use of this information.